# Patient Record
Sex: FEMALE | Race: WHITE | NOT HISPANIC OR LATINO | Employment: OTHER | URBAN - METROPOLITAN AREA
[De-identification: names, ages, dates, MRNs, and addresses within clinical notes are randomized per-mention and may not be internally consistent; named-entity substitution may affect disease eponyms.]

---

## 2017-01-12 ENCOUNTER — TELEPHONE (OUTPATIENT)
Dept: CARDIOLOGY | Facility: CLINIC | Age: 67
End: 2017-01-12

## 2017-01-12 DIAGNOSIS — J44.89 ASTHMA WITH COPD: ICD-10-CM

## 2017-01-12 NOTE — TELEPHONE ENCOUNTER
----- Message from Liv Louise sent at 1/12/2017  8:58 AM CST -----  Patient she has called in a refill on [place] to [facility] [phone] and they have not gotten a response from the doctor.   She wants to know if you have called it in yet?   Call her at [phone].                                                   rome

## 2017-01-12 NOTE — TELEPHONE ENCOUNTER
----- Message from Liv Louise sent at 1/12/2017  9:00 AM CST -----  Patient wants to know when she is suppose to come back in because she has nothing scheduled to see Dr Urban.   Call her at [phone].                                       rome

## 2017-01-17 ENCOUNTER — LAB VISIT (OUTPATIENT)
Dept: LAB | Facility: HOSPITAL | Age: 67
End: 2017-01-17
Attending: INTERNAL MEDICINE
Payer: COMMERCIAL

## 2017-01-17 DIAGNOSIS — E11.9 DIABETES MELLITUS WITHOUT COMPLICATION: ICD-10-CM

## 2017-01-17 LAB
ALBUMIN SERPL BCP-MCNC: 3.7 G/DL
ALP SERPL-CCNC: 132 U/L
ALT SERPL W/O P-5'-P-CCNC: 16 U/L
ANION GAP SERPL CALC-SCNC: 10 MMOL/L
AST SERPL-CCNC: 17 U/L
BILIRUB SERPL-MCNC: 0.3 MG/DL
BUN SERPL-MCNC: 15 MG/DL
CALCIUM SERPL-MCNC: 9.5 MG/DL
CHLORIDE SERPL-SCNC: 101 MMOL/L
CHOLEST/HDLC SERPL: 2.9 {RATIO}
CO2 SERPL-SCNC: 26 MMOL/L
CREAT SERPL-MCNC: 0.9 MG/DL
EST. GFR  (AFRICAN AMERICAN): >60 ML/MIN/1.73 M^2
EST. GFR  (NON AFRICAN AMERICAN): >60 ML/MIN/1.73 M^2
GLUCOSE SERPL-MCNC: 125 MG/DL
HDL/CHOLESTEROL RATIO: 34.1 %
HDLC SERPL-MCNC: 129 MG/DL
HDLC SERPL-MCNC: 44 MG/DL
LDLC SERPL CALC-MCNC: 58.6 MG/DL
NONHDLC SERPL-MCNC: 85 MG/DL
POTASSIUM SERPL-SCNC: 4.4 MMOL/L
PROT SERPL-MCNC: 7.4 G/DL
SODIUM SERPL-SCNC: 137 MMOL/L
TRIGL SERPL-MCNC: 132 MG/DL

## 2017-01-17 PROCEDURE — 36415 COLL VENOUS BLD VENIPUNCTURE: CPT

## 2017-01-17 PROCEDURE — 80061 LIPID PANEL: CPT

## 2017-01-17 PROCEDURE — 80053 COMPREHEN METABOLIC PANEL: CPT

## 2017-01-24 ENCOUNTER — OFFICE VISIT (OUTPATIENT)
Dept: INTERNAL MEDICINE | Facility: CLINIC | Age: 67
End: 2017-01-24
Payer: COMMERCIAL

## 2017-01-24 VITALS
OXYGEN SATURATION: 95 % | WEIGHT: 164.88 LBS | HEART RATE: 76 BPM | DIASTOLIC BLOOD PRESSURE: 74 MMHG | SYSTOLIC BLOOD PRESSURE: 128 MMHG | TEMPERATURE: 98 F | BODY MASS INDEX: 29.21 KG/M2 | HEIGHT: 63 IN

## 2017-01-24 DIAGNOSIS — M79.7 FIBROMYALGIA: Chronic | ICD-10-CM

## 2017-01-24 DIAGNOSIS — I25.10 CORONARY ARTERY DISEASE INVOLVING NATIVE CORONARY ARTERY OF NATIVE HEART WITHOUT ANGINA PECTORIS: Chronic | ICD-10-CM

## 2017-01-24 DIAGNOSIS — E78.5 HYPERLIPIDEMIA LDL GOAL <70: ICD-10-CM

## 2017-01-24 DIAGNOSIS — I10 ESSENTIAL HYPERTENSION: Primary | Chronic | ICD-10-CM

## 2017-01-24 PROCEDURE — 3060F POS MICROALBUMINURIA REV: CPT | Mod: S$GLB,,, | Performed by: INTERNAL MEDICINE

## 2017-01-24 PROCEDURE — 99214 OFFICE O/P EST MOD 30 MIN: CPT | Mod: S$GLB,,, | Performed by: INTERNAL MEDICINE

## 2017-01-24 PROCEDURE — 3045F PR MOST RECENT HEMOGLOBIN A1C LEVEL 7.0-9.0%: CPT | Mod: S$GLB,,, | Performed by: INTERNAL MEDICINE

## 2017-01-24 PROCEDURE — 3078F DIAST BP <80 MM HG: CPT | Mod: S$GLB,,, | Performed by: INTERNAL MEDICINE

## 2017-01-24 PROCEDURE — 3074F SYST BP LT 130 MM HG: CPT | Mod: S$GLB,,, | Performed by: INTERNAL MEDICINE

## 2017-01-24 PROCEDURE — 1159F MED LIST DOCD IN RCRD: CPT | Mod: S$GLB,,, | Performed by: INTERNAL MEDICINE

## 2017-01-24 PROCEDURE — 99999 PR PBB SHADOW E&M-EST. PATIENT-LVL III: CPT | Mod: PBBFAC,,, | Performed by: INTERNAL MEDICINE

## 2017-01-24 NOTE — MR AVS SNAPSHOT
[facility]  [address] [address]  Phone: [phone]  Fax: [phone]                  Tiana Bosch   2017 9:20 AM   Office Visit    Description:  Female : 1950   Provider:  Estelle Stone DO   [facility]           Reason for Visit     Follow-up           Diagnoses this Visit        Comments    Essential hypertension    -  Primary     Hyperlipidemia LDL goal <70         Coronary artery disease involving native coronary artery of native heart without angina pectoris         Uncontrolled type 2 diabetes mellitus without complication, without long-term current use of insulin         Fibromyalgia                To Do List           Future Appointments        Provider Department Dept Phone    2017 8:40 AM LABORATORY, [address] [facility] [phone]    2017 1:30 PM Sriram Urban MD [facility] [phone]    2017 8:20 AM LABORATORY, [address] [facility] [phone]    2017 8:50 AM SPECIMEN, [address] [facility] [phone]    2017 9:20 AM Estelle Stone DO [facility] Medicine [phone]      Goals (5 Years of Data)     None      [facility]     [facility] Nurse Care [facility] -  Assistance  Registered nurses in the [facility] provide clinical advisement, health education, appointment booking, and other advisory services.  Call for this free service at [phone].             Medications           Message regarding Medications     Verify the changes and/or additions to your medication regime listed below are the same as discussed with your clinician today.  If any of these changes or additions are incorrect, please notify your healthcare provider.        STOP taking these medications     azithromycin (ZITHROMAX Z-AMNA) 250 MG tablet Take 1 tablet (250 mg total) by mouth once daily. 500 mg on day  1 (two tablets) followed by 250 mg once daily on days 2-5    chlorhexidine (PERIDEX) 0.12 % solution     doxycycline (MONODOX) 100 MG capsule TAKE ONE CAPSULE BY MOUTH 2 TIMES PER DAY FOR 10 DAYS           Verify that the below list of medications is an accurate representation of the medications you are currently taking.  If none reported, the list may be blank. If incorrect, please contact your healthcare provider. Carry this list with you in case of emergency.           Current Medications     albuterol (PROVENTIL HFA) 90 mcg/actuation inhaler Inhale 2 puffs into the lungs every 6 (six) hours as needed for Wheezing.    aspirin (ECOTRIN) 81 MG EC tablet Take 1 tablet (81 mg total) by mouth once daily.    calcium carbonate (OS-RENALDO) 500 mg calcium (1,250 mg) tablet Take 1 tablet by mouth once daily.    clopidogrel (PLAVIX) 75 mg tablet Take 1 tablet (75 mg total) by mouth once daily.    cyclobenzaprine (FLEXERIL) 10 MG tablet Take 1 tablet (10 mg total) by mouth 3 (three) times daily.    fluocinolone acetonide oil 0.01 % Drop INSTILL THREE DROPS INTO AFFECTED EAR TWICE DAILY     gabapentin (NEURONTIN) 400 MG capsule Take 2 capsules (800 mg total) by mouth 3 (three) times daily.    griseofulvin (GRIFULVIN V) 500 mg tablet Take 500 mg by mouth once daily.    lancets Misc 1 lancet by Misc.(Non-Drug; Combo Route) route daily as needed. Check glucose once daily    lorazepam (ATIVAN) 0.5 MG tablet Take 1 tablet (0.5 mg total) by mouth every 12 (twelve) hours as needed for Anxiety.    metformin (GLUCOPHAGE-XR) 500 MG 24 hr tablet TAKE 1 TABLET (500 MG TOTAL) BY MOUTH DAILY WITH BREAKFAST.    metoprolol tartrate (LOPRESSOR) 25 MG tablet Take 1 tablet (25 mg total) by mouth 2 (two) times daily.    mometasone-formoterol (DULERA) 200-5 mcg/actuation inhaler Inhale 2 puffs into the lungs 2 (two) times daily.    montelukast (SINGULAIR) 10 mg tablet Take 1 tablet (10 mg total) by mouth every evening.    nitroGLYCERIN (NITROSTAT)  "0.4 MG SL tablet Place 1 tablet (0.4 mg total) under the tongue every 5 (five) minutes as needed for Chest pain.    ONETOUCH DELICA LANCETS 33 gauge Misc     ONETOUCH ULTRA TEST Strp TEST ONE TIME AMAYA.    tramadol (ULTRAM) 50 mg tablet Take 2 tablets (100 mg total) by mouth every 8 (eight) hours as needed for Pain.           Clinical Reference Information           Vital Signs - Last Recorded  Most recent update: 1/24/2017  9:45 AM by Radha Del Valle    BP Pulse Temp Ht    128/74 (BP Location: Left arm, Patient Position: Sitting, BP Method: Manual) 76 98 °F (36.7 °C) (Tympanic) 5' 3" (1.6 m)    Wt SpO2 BMI    74.8 kg (164 lb 14.5 oz) 95% 29.21 kg/m2      Blood Pressure          Most Recent Value    BP  128/74      Allergies as of 1/24/2017     Codeine    Spiriva Respimat [Tiotropium Bromide]    Statins-hmg-coa Reductase Inhibitors      Immunizations Administered on Date of Encounter - 1/24/2017     None      Orders Placed During Today's Visit     Future Labs/Procedures Expected by Expires    MICROALBUMIN / CREATININE RATIO URINE  1/24/2017 3/25/2018    TSH  As directed 1/24/2018    Recurring Lab Work Interval Expires    Comprehensive metabolic panel  Every 12 Weeks until 3/25/2019 3/25/2019    Hemoglobin A1c  Every 12 Weeks until 3/25/2019 3/25/2019    Lipid panel  Every 12 Weeks until 3/25/2019 3/25/2019      "
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
## 2017-01-28 DIAGNOSIS — M79.7 FIBROMYALGIA: ICD-10-CM

## 2017-01-30 RX ORDER — TRAMADOL HYDROCHLORIDE 50 MG/1
TABLET ORAL
Qty: 180 TABLET | Refills: 0 | OUTPATIENT
Start: 2017-01-30

## 2017-01-31 DIAGNOSIS — F43.23 SITUATIONAL MIXED ANXIETY AND DEPRESSIVE DISORDER: ICD-10-CM

## 2017-02-01 RX ORDER — LORAZEPAM 0.5 MG/1
0.5 TABLET ORAL EVERY 12 HOURS PRN
Qty: 30 TABLET | Refills: 0 | Status: SHIPPED | OUTPATIENT
Start: 2017-02-01 | End: 2017-04-09 | Stop reason: SDUPTHER

## 2017-02-07 ENCOUNTER — TELEPHONE (OUTPATIENT)
Dept: OBSTETRICS AND GYNECOLOGY | Facility: CLINIC | Age: 67
End: 2017-02-07

## 2017-02-07 ENCOUNTER — TELEPHONE (OUTPATIENT)
Dept: CARDIOLOGY | Facility: CLINIC | Age: 67
End: 2017-02-07

## 2017-02-07 DIAGNOSIS — Z12.31 ENCOUNTER FOR SCREENING MAMMOGRAM FOR MALIGNANT NEOPLASM OF BREAST: Primary | ICD-10-CM

## 2017-02-07 DIAGNOSIS — M79.7 FIBROMYALGIA: ICD-10-CM

## 2017-02-07 NOTE — TELEPHONE ENCOUNTER
----- Message from Kaye Roy sent at 2/7/2017  8:50 AM CST -----  Contact: Patient  Patient is requesting a mammogram, but there are no orders in computer, please call patient back at [phone]. Thank you

## 2017-02-07 NOTE — TELEPHONE ENCOUNTER
----- Message from Kaye Roy sent at 2/7/2017  8:47 AM CST -----  Contact: patient  Patient was scheduled for labs tomorrow, but had labs last week, patient wants to know if she has to have labs again, please call her back at [phone]. Thank you

## 2017-02-07 NOTE — TELEPHONE ENCOUNTER
Lab appt for tomorrow has been canceled since labs were done last week.  Patient is aware and voiced understanding.

## 2017-02-08 ENCOUNTER — TELEPHONE (OUTPATIENT)
Dept: PULMONOLOGY | Facility: CLINIC | Age: 67
End: 2017-02-08

## 2017-02-08 DIAGNOSIS — J44.89 ASTHMA WITH COPD: Primary | ICD-10-CM

## 2017-02-08 NOTE — TELEPHONE ENCOUNTER
----- Message from Shahida Anderson sent at 2/7/2017  2:43 PM CST -----  Contact: pt  Pt is requesting to speak with nurse regarding questions on her inhaler medication. Pt did not have drug specifics. Pls call pt back at [phone]

## 2017-02-09 ENCOUNTER — PATIENT MESSAGE (OUTPATIENT)
Dept: INTERNAL MEDICINE | Facility: CLINIC | Age: 67
End: 2017-02-09

## 2017-02-09 RX ORDER — TRAMADOL HYDROCHLORIDE 50 MG/1
100 TABLET ORAL EVERY 8 HOURS PRN
Qty: 180 TABLET | Refills: 5 | Status: SHIPPED | OUTPATIENT
Start: 2017-02-09 | End: 2017-08-04 | Stop reason: SDUPTHER

## 2017-02-16 ENCOUNTER — OFFICE VISIT (OUTPATIENT)
Dept: CARDIOLOGY | Facility: CLINIC | Age: 67
End: 2017-02-16
Payer: COMMERCIAL

## 2017-02-16 ENCOUNTER — CLINICAL SUPPORT (OUTPATIENT)
Dept: CARDIOLOGY | Facility: CLINIC | Age: 67
End: 2017-02-16
Payer: COMMERCIAL

## 2017-02-16 VITALS
WEIGHT: 167.31 LBS | DIASTOLIC BLOOD PRESSURE: 74 MMHG | HEIGHT: 63 IN | SYSTOLIC BLOOD PRESSURE: 150 MMHG | HEART RATE: 89 BPM | BODY MASS INDEX: 29.64 KG/M2

## 2017-02-16 DIAGNOSIS — S42.202D CLOSED FRACTURE OF PROXIMAL END OF LEFT HUMERUS WITH ROUTINE HEALING, UNSPECIFIED FRACTURE MORPHOLOGY, SUBSEQUENT ENCOUNTER: ICD-10-CM

## 2017-02-16 DIAGNOSIS — R06.00 DYSPNEA, UNSPECIFIED TYPE: ICD-10-CM

## 2017-02-16 DIAGNOSIS — I10 ESSENTIAL HYPERTENSION: Chronic | ICD-10-CM

## 2017-02-16 DIAGNOSIS — S42.209A: ICD-10-CM

## 2017-02-16 DIAGNOSIS — I25.10 CORONARY ARTERY DISEASE, ANGINA PRESENCE UNSPECIFIED, UNSPECIFIED VESSEL OR LESION TYPE, UNSPECIFIED WHETHER NATIVE OR TRANSPLANTED HEART: ICD-10-CM

## 2017-02-16 DIAGNOSIS — J96.11 CHRONIC RESPIRATORY FAILURE WITH HYPOXIA: ICD-10-CM

## 2017-02-16 DIAGNOSIS — E78.5 HYPERLIPIDEMIA LDL GOAL <70: ICD-10-CM

## 2017-02-16 DIAGNOSIS — J44.9 COPD, SEVERE: ICD-10-CM

## 2017-02-16 DIAGNOSIS — I25.10 CORONARY ARTERY DISEASE, ANGINA PRESENCE UNSPECIFIED, UNSPECIFIED VESSEL OR LESION TYPE, UNSPECIFIED WHETHER NATIVE OR TRANSPLANTED HEART: Primary | ICD-10-CM

## 2017-02-16 PROCEDURE — 3078F DIAST BP <80 MM HG: CPT | Mod: S$GLB,,, | Performed by: INTERNAL MEDICINE

## 2017-02-16 PROCEDURE — 99999 PR PBB SHADOW E&M-EST. PATIENT-LVL III: CPT | Mod: PBBFAC,,, | Performed by: INTERNAL MEDICINE

## 2017-02-16 PROCEDURE — 3060F POS MICROALBUMINURIA REV: CPT | Mod: S$GLB,,, | Performed by: INTERNAL MEDICINE

## 2017-02-16 PROCEDURE — 3045F PR MOST RECENT HEMOGLOBIN A1C LEVEL 7.0-9.0%: CPT | Mod: S$GLB,,, | Performed by: INTERNAL MEDICINE

## 2017-02-16 PROCEDURE — 93000 ELECTROCARDIOGRAM COMPLETE: CPT | Mod: S$GLB,,, | Performed by: INTERNAL MEDICINE

## 2017-02-16 PROCEDURE — 99214 OFFICE O/P EST MOD 30 MIN: CPT | Mod: S$GLB,,, | Performed by: INTERNAL MEDICINE

## 2017-02-16 PROCEDURE — 3077F SYST BP >= 140 MM HG: CPT | Mod: S$GLB,,, | Performed by: INTERNAL MEDICINE

## 2017-02-16 NOTE — MR AVS SNAPSHOT
[facility]  [address] [address]  Phone: [phone]  Fax: [phone]                  Tiana Bosch   2017 1:30 PM   Office Visit    Description:  Female : 1950   Provider:  Sriram Urban MD   Department:  [facility]           Reason for Visit     Coronary Artery Disease     Hypertension           Diagnoses this Visit        Comments    Coronary artery disease, angina presence unspecified, unspecified vessel or lesion type, unspecified whether native or transplanted heart    -  Primary     Dyspnea, unspecified type         Essential hypertension         Hyperlipidemia LDL goal <70         Uncontrolled type 2 diabetes mellitus without complication, without long-term current use of insulin         Chronic respiratory failure with hypoxia         COPD, severe         Fracture of proximal end of humerus, unspecified fracture morphology, unspecified laterality, initial encounter         Closed fracture of proximal end of left humerus with routine healing, unspecified fracture morphology, subsequent encounter                To Do List           Future Appointments        Provider Department Dept Phone    3/7/2017 2:00 PM [facility] MAMMO1 [facility] [phone]    3/7/2017 2:30 PM Shy Lamb MD [facility] OB/ -[phone]    2017 8:20 AM LABORATORY, [address] [facility] [phone]    2017 8:50 AM SPECIMEN, [address] [facility] [phone]    2017 9:20 AM Estelle Stone DO [facility] - Internal Medicine [phone]      Goals (5 Years of Data)     None      Follow-Up and Disposition     Return in about 6 months (around 2017).      [facility]     [facility] Care [facility] -  Assistance  Registered nurses in the [facility] provide clinical advisement, health education, appointment booking, and other advisory services.  Call for this free service  at [phone].             Medications           Message regarding Medications     Verify the changes and/or additions to your medication regime listed below are the same as discussed with your clinician today.  If any of these changes or additions are incorrect, please notify your healthcare provider.        STOP taking these medications     clopidogrel (PLAVIX) 75 mg tablet Take 1 tablet (75 mg total) by mouth once daily.           Verify that the below list of medications is an accurate representation of the medications you are currently taking.  If none reported, the list may be blank. If incorrect, please contact your healthcare provider. Carry this list with you in case of emergency.           Current Medications     aspirin (ECOTRIN) 81 MG EC tablet Take 1 tablet (81 mg total) by mouth once daily.    calcium carbonate (OS-RENALDO) 500 mg calcium (1,250 mg) tablet Take 1 tablet by mouth once daily.    cyclobenzaprine (FLEXERIL) 10 MG tablet Take 1 tablet (10 mg total) by mouth 3 (three) times daily.    fluocinolone acetonide oil 0.01 % Drop INSTILL THREE DROPS INTO AFFECTED EAR TWICE DAILY     gabapentin (NEURONTIN) 400 MG capsule Take 2 capsules (800 mg total) by mouth 3 (three) times daily.    griseofulvin (GRIFULVIN V) 500 mg tablet Take 500 mg by mouth once daily.    lorazepam (ATIVAN) 0.5 MG tablet Take 1 tablet (0.5 mg total) by mouth every 12 (twelve) hours as needed for Anxiety.    metformin (GLUCOPHAGE-XR) 500 MG 24 hr tablet TAKE 1 TABLET (500 MG TOTAL) BY MOUTH DAILY WITH BREAKFAST.    metoprolol tartrate (LOPRESSOR) 25 MG tablet Take 1 tablet (25 mg total) by mouth 2 (two) times daily.    mometasone-formoterol (DULERA) 200-5 mcg/actuation inhaler Inhale 2 puffs into the lungs 2 (two) times daily.    montelukast (SINGULAIR) 10 mg tablet Take 1 tablet (10 mg total) by mouth every evening.    tramadol (ULTRAM) 50 mg tablet Take 2 tablets (100 mg total) by mouth every 8 (eight) hours as needed for  "Pain.    albuterol (PROVENTIL HFA) 90 mcg/actuation inhaler Inhale 2 puffs into the lungs every 6 (six) hours as needed for Wheezing.    inhalation device (BREATHERITE VALVED MDI CHAMBER) Use as directed for inhalation.    lancets Misc 1 lancet by Misc.(Non-Drug; Combo Route) route daily as needed. Check glucose once daily    nitroGLYCERIN (NITROSTAT) 0.4 MG SL tablet Place 1 tablet (0.4 mg total) under the tongue every 5 (five) minutes as needed for Chest pain.    ONETOUCH DELICA LANCETS 33 gauge Misc     ONETOUCH ULTRA TEST Strp TEST ONE TIME AMAYA.           Clinical Reference Information           Your Vitals Were     BP Pulse Height Weight BMI    150/74 89 5' 3" (1.6 m) 75.9 kg (167 lb 5.3 oz) 29.64 kg/m2      Blood Pressure          Most Recent Value    Right Arm BP - Sitting  150/74    Left Arm BP - Sitting  150/72    BP  (!)  150/74      Allergies as of 2/16/2017     Codeine    Spiriva Respimat [Tiotropium Bromide]    Statins-hmg-coa Reductase Inhibitors      Immunizations Administered on Date of Encounter - 2/16/2017     None      Orders Placed During Today's Visit     Future Labs/Procedures Expected by Expires    Comprehensive metabolic panel  8/18/2017 (Approximate) 2/16/2018    Lipid panel  8/18/2017 (Approximate) 2/16/2018    SCHEDULED EKG 12-LEAD (to Muse)  As directed 2/14/2018      Language Assistance Services     ATTENTION: Language assistance services are available, free of charge. Please call [phone].      ATENCIÓN: Si shala camacho, tiene a pepe disposición servicios gratuitos de asistencia lingüística. Llame al [phone].     CHÚ Ý: N?u b?n nói Ti?ng Vi?t, có các d?ch v? h? tr? ngôn ng? mi?n phí dành cho b?n. G?i s? [phone].         [facility] complies with applicable Federal civil rights laws and does not discriminate on the basis of race, color, national origin, age, disability, or sex.        "
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
## 2017-03-01 ENCOUNTER — PATIENT MESSAGE (OUTPATIENT)
Dept: OPHTHALMOLOGY | Facility: CLINIC | Age: 67
End: 2017-03-01

## 2017-03-01 ENCOUNTER — TELEPHONE (OUTPATIENT)
Dept: OPHTHALMOLOGY | Facility: CLINIC | Age: 67
End: 2017-03-01

## 2017-03-06 ENCOUNTER — OFFICE VISIT (OUTPATIENT)
Dept: URGENT CARE | Facility: CLINIC | Age: 67
End: 2017-03-06
Payer: COMMERCIAL

## 2017-03-06 ENCOUNTER — HOSPITAL ENCOUNTER (OUTPATIENT)
Dept: RADIOLOGY | Facility: HOSPITAL | Age: 67
Discharge: HOME | End: 2017-03-06
Attending: NURSE PRACTITIONER
Payer: COMMERCIAL

## 2017-03-06 VITALS
HEART RATE: 89 BPM | RESPIRATION RATE: 20 BRPM | BODY MASS INDEX: 29.8 KG/M2 | HEIGHT: 63 IN | SYSTOLIC BLOOD PRESSURE: 136 MMHG | OXYGEN SATURATION: 95 % | DIASTOLIC BLOOD PRESSURE: 70 MMHG | WEIGHT: 168.19 LBS | TEMPERATURE: 99 F

## 2017-03-06 DIAGNOSIS — M79.675 PAIN OF TOE OF LEFT FOOT: Primary | ICD-10-CM

## 2017-03-06 DIAGNOSIS — M79.675 PAIN OF TOE OF LEFT FOOT: ICD-10-CM

## 2017-03-06 PROCEDURE — 1160F RVW MEDS BY RX/DR IN RCRD: CPT | Mod: S$GLB,,, | Performed by: NURSE PRACTITIONER

## 2017-03-06 PROCEDURE — 3075F SYST BP GE 130 - 139MM HG: CPT | Mod: S$GLB,,, | Performed by: NURSE PRACTITIONER

## 2017-03-06 PROCEDURE — 99213 OFFICE O/P EST LOW 20 MIN: CPT | Mod: 25,S$GLB,, | Performed by: NURSE PRACTITIONER

## 2017-03-06 PROCEDURE — 96372 THER/PROPH/DIAG INJ SC/IM: CPT | Mod: S$GLB,,, | Performed by: NURSE PRACTITIONER

## 2017-03-06 PROCEDURE — 99999 PR PBB SHADOW E&M-EST. PATIENT-LVL V: CPT | Mod: PBBFAC,,, | Performed by: NURSE PRACTITIONER

## 2017-03-06 PROCEDURE — 73630 X-RAY EXAM OF FOOT: CPT | Mod: 26,LT,, | Performed by: RADIOLOGY

## 2017-03-06 PROCEDURE — 3078F DIAST BP <80 MM HG: CPT | Mod: S$GLB,,, | Performed by: NURSE PRACTITIONER

## 2017-03-06 PROCEDURE — 73630 X-RAY EXAM OF FOOT: CPT | Mod: TC,PO,LT

## 2017-03-06 RX ORDER — KETOROLAC TROMETHAMINE 30 MG/ML
60 INJECTION, SOLUTION INTRAMUSCULAR; INTRAVENOUS ONCE
Status: COMPLETED | OUTPATIENT
Start: 2017-03-06 | End: 2017-03-06

## 2017-03-06 RX ADMIN — KETOROLAC TROMETHAMINE 60 MG: 30 INJECTION, SOLUTION INTRAMUSCULAR; INTRAVENOUS at 04:03

## 2017-03-06 NOTE — PROGRESS NOTES
Per written order of Rachel Herring NP  Ketorolac 60 mg/2 ml was given to the Left  VG, advised patient to wait 20 min after injection to watch for s/s of adverse reactions, patient waited in exam room. No distress noted.

## 2017-03-06 NOTE — MR AVS SNAPSHOT
[facility]  [address]  [facility] [address]  Phone: [phone]  Fax: [phone]                  Tiana Bosch   3/6/2017 3:10 PM   Office Visit    Description:  Female : 1950   Provider:  Meche Herring NP   Department:  [facility]           Reason for Visit     Foot Injury           Diagnoses this Visit        Comments    Pain of toe of left foot    -  Primary            To Do List           Future Appointments        Provider Department Dept Phone    3/7/2017 2:00 PM [facility] M[facility]1 [facility] [phone]    3/7/2017 2:30 PM Shy Lamb MD [facility]/ -[phone]    2017 8:20 AM LABORATORY, [address] [facility] [phone]    2017 8:50 AM SPECIMEN, [address] [facility] [phone]    2017 9:20 AM Estelle Stone DO [facility] Medicine [phone]      Goals (5 Years of Data)     None      [facility]     [facility] -  Assistance  Registered nurses in the [facility] provide clinical advisement, health education, appointment booking, and other advisory services.  Call for this free service at [phone].             Medications           Message regarding Medications     Verify the changes and/or additions to your medication regime listed below are the same as discussed with your clinician today.  If any of these changes or additions are incorrect, please notify your healthcare provider.        These medications were administered today        Dose Freq    ketorolac injection 60 mg 60 mg Once    Sig: Inject 2 mLs (60 mg total) into the muscle once.    Class: Normal    Route: Intramuscular           Verify that the below list of medications is an accurate representation of the medications you are currently taking.  If none reported, the list may be blank. If incorrect, please contact your healthcare provider.  Carry this list with you in case of emergency.           Current Medications     albuterol (PROVENTIL HFA) 90 mcg/actuation inhaler Inhale 2 puffs into the lungs every 6 (six) hours as needed for Wheezing.    aspirin (ECOTRIN) 81 MG EC tablet Take 1 tablet (81 mg total) by mouth once daily.    calcium carbonate (OS-RENALDO) 500 mg calcium (1,250 mg) tablet Take 1 tablet by mouth once daily.    cyclobenzaprine (FLEXERIL) 10 MG tablet Take 1 tablet (10 mg total) by mouth 3 (three) times daily.    fluocinolone acetonide oil 0.01 % Drop INSTILL THREE DROPS INTO AFFECTED EAR TWICE DAILY     gabapentin (NEURONTIN) 400 MG capsule Take 2 capsules (800 mg total) by mouth 3 (three) times daily.    griseofulvin (GRIFULVIN V) 500 mg tablet Take 500 mg by mouth once daily.    inhalation device (BREATHERITE VALVED MDI CHAMBER) Use as directed for inhalation.    lancets Misc 1 lancet by Misc.(Non-Drug; Combo Route) route daily as needed. Check glucose once daily    lorazepam (ATIVAN) 0.5 MG tablet Take 1 tablet (0.5 mg total) by mouth every 12 (twelve) hours as needed for Anxiety.    metformin (GLUCOPHAGE-XR) 500 MG 24 hr tablet TAKE 1 TABLET (500 MG TOTAL) BY MOUTH DAILY WITH BREAKFAST.    metoprolol tartrate (LOPRESSOR) 25 MG tablet Take 1 tablet (25 mg total) by mouth 2 (two) times daily.    mometasone-formoterol (DULERA) 200-5 mcg/actuation inhaler Inhale 2 puffs into the lungs 2 (two) times daily.    montelukast (SINGULAIR) 10 mg tablet Take 1 tablet (10 mg total) by mouth every evening.    nitroGLYCERIN (NITROSTAT) 0.4 MG SL tablet Place 1 tablet (0.4 mg total) under the tongue every 5 (five) minutes as needed for Chest pain.    ONETOUCH DELICA LANCETS 33 gauge Misc     ONETOUCH ULTRA TEST Strp TEST ONE TIME AMAYA.    tramadol (ULTRAM) 50 mg tablet Take 2 tablets (100 mg total) by mouth every 8 (eight) hours as needed for Pain.           Clinical Reference Information           Your Vitals Were     BP Pulse Temp Resp    136/70  "(BP Location: Right arm, Patient Position: Sitting, BP Method: Manual) 89 98.8 °F (37.1 °C) (Tympanic) 20    Height Weight SpO2 BMI    5' 2.5" (1.588 m) 76.3 kg (168 lb 3.4 oz) 95% 30.28 kg/m2      Blood Pressure          Most Recent Value    BP  136/70      Allergies as of 3/6/2017     Codeine    Spiriva Respimat [Tiotropium Bromide]    Statins-hmg-coa Reductase Inhibitors      Immunizations Administered on Date of Encounter - 3/6/2017     None      Orders Placed During Today's Visit     Future Labs/Procedures Expected by Expires    X-Ray Foot Complete 3 view Left  3/6/2017 3/6/2018      Instructions    PLAN:   Toradol 60 mg IM now  Advise compresses and elevate extremity  Advised continue tramadol   Tylenol or Ibuprofen for fever, headache and body aches.  Advise follow-up with Primary care Physician   Advise  go to ER if symptoms worsen or fail to improve with treatment.           Language Assistance Services     ATTENTION: Language assistance services are available, free of charge. Please call [phone].      ATENCIÓN: Si habla camacho, tiene a pepe disposición servicios gratuitos de asistencia lingüística. Llame al [phone].     CHÚ Ý: N?u b?n nói Ti?ng Vi?t, có các d?ch v? h? tr? ngôn ng? mi?n phí dành cho b?n. G?i s? [phone].         [facility] complies with applicable Federal civil rights laws and does not discriminate on the basis of race, color, national origin, age, disability, or sex.        "

## 2017-03-06 NOTE — PATIENT INSTRUCTIONS
PLAN:   Toradol 60 mg IM now  Advised amirah tape  Advise compresses and elevate extremity  Advised continue tramadol   Tylenol or Ibuprofen for fever, headache and body aches.  Advise follow-up with Primary care Physician   Advise  go to ER if symptoms worsen or fail to improve with treatment.

## 2017-03-21 ENCOUNTER — OFFICE VISIT (OUTPATIENT)
Dept: OBSTETRICS AND GYNECOLOGY | Facility: CLINIC | Age: 67
End: 2017-03-21
Payer: COMMERCIAL

## 2017-03-21 ENCOUNTER — HOSPITAL ENCOUNTER (OUTPATIENT)
Dept: RADIOLOGY | Facility: HOSPITAL | Age: 67
Discharge: HOME | End: 2017-03-21
Attending: OBSTETRICS & GYNECOLOGY
Payer: COMMERCIAL

## 2017-03-21 VITALS — BODY MASS INDEX: 28.71 KG/M2 | HEIGHT: 64 IN | WEIGHT: 168.19 LBS

## 2017-03-21 DIAGNOSIS — Z12.31 ENCOUNTER FOR SCREENING MAMMOGRAM FOR MALIGNANT NEOPLASM OF BREAST: ICD-10-CM

## 2017-03-21 DIAGNOSIS — Z01.419 ENCOUNTER FOR GYNECOLOGICAL EXAMINATION WITHOUT ABNORMAL FINDING: Primary | ICD-10-CM

## 2017-03-21 PROCEDURE — 77067 SCR MAMMO BI INCL CAD: CPT | Mod: TC

## 2017-03-21 PROCEDURE — 99999 PR PBB SHADOW E&M-EST. PATIENT-LVL III: CPT | Mod: PBBFAC,,, | Performed by: OBSTETRICS & GYNECOLOGY

## 2017-03-21 PROCEDURE — 77067 SCR MAMMO BI INCL CAD: CPT | Mod: 26,,, | Performed by: RADIOLOGY

## 2017-03-21 PROCEDURE — 99396 PREV VISIT EST AGE 40-64: CPT | Mod: S$GLB,,, | Performed by: OBSTETRICS & GYNECOLOGY
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
## 2017-03-31 ENCOUNTER — PATIENT MESSAGE (OUTPATIENT)
Dept: PULMONOLOGY | Facility: CLINIC | Age: 67
End: 2017-03-31

## 2017-03-31 ENCOUNTER — PATIENT MESSAGE (OUTPATIENT)
Dept: CARDIOLOGY | Facility: CLINIC | Age: 67
End: 2017-03-31

## 2017-03-31 DIAGNOSIS — J44.9 COPD, SEVERE: ICD-10-CM

## 2017-03-31 DIAGNOSIS — I25.10 CORONARY ARTERY DISEASE INVOLVING NATIVE CORONARY ARTERY OF NATIVE HEART WITHOUT ANGINA PECTORIS: ICD-10-CM

## 2017-03-31 DIAGNOSIS — I21.4 NSTEMI (NON-ST ELEVATED MYOCARDIAL INFARCTION): ICD-10-CM

## 2017-03-31 RX ORDER — NITROGLYCERIN 0.4 MG/1
0.4 TABLET SUBLINGUAL EVERY 5 MIN PRN
Qty: 30 TABLET | Refills: 4 | Status: SHIPPED | OUTPATIENT
Start: 2017-03-31 | End: 2018-04-12 | Stop reason: SDUPTHER

## 2017-04-02 RX ORDER — ALBUTEROL SULFATE 90 UG/1
2 AEROSOL, METERED RESPIRATORY (INHALATION) EVERY 6 HOURS PRN
Qty: 18 G | Refills: 11 | Status: SHIPPED | OUTPATIENT
Start: 2017-04-02 | End: 2017-10-25 | Stop reason: SDUPTHER

## 2017-04-04 RX ORDER — LANCETS 33 GAUGE
1 EACH MISCELLANEOUS DAILY
Qty: 100 EACH | Refills: 3 | Status: SHIPPED | OUTPATIENT
Start: 2017-04-04 | End: 2018-04-10 | Stop reason: SDUPTHER

## 2017-04-09 DIAGNOSIS — F43.23 SITUATIONAL MIXED ANXIETY AND DEPRESSIVE DISORDER: ICD-10-CM

## 2017-04-10 RX ORDER — LORAZEPAM 0.5 MG/1
TABLET ORAL
Qty: 30 TABLET | Refills: 0 | Status: SHIPPED | OUTPATIENT
Start: 2017-04-10 | End: 2017-05-09 | Stop reason: SDUPTHER

## 2017-04-12 DIAGNOSIS — M79.7 FIBROMYALGIA: ICD-10-CM

## 2017-04-13 RX ORDER — GABAPENTIN 400 MG/1
800 CAPSULE ORAL 3 TIMES DAILY
Qty: 180 CAPSULE | Refills: 6 | Status: SHIPPED | OUTPATIENT
Start: 2017-04-13 | End: 2017-05-01 | Stop reason: SDUPTHER

## 2017-05-01 ENCOUNTER — TELEPHONE (OUTPATIENT)
Dept: INTERNAL MEDICINE | Facility: CLINIC | Age: 67
End: 2017-05-01

## 2017-05-01 ENCOUNTER — OFFICE VISIT (OUTPATIENT)
Dept: INTERNAL MEDICINE | Facility: CLINIC | Age: 67
End: 2017-05-01
Payer: COMMERCIAL

## 2017-05-01 ENCOUNTER — PATIENT MESSAGE (OUTPATIENT)
Dept: INTERNAL MEDICINE | Facility: CLINIC | Age: 67
End: 2017-05-01

## 2017-05-01 VITALS
TEMPERATURE: 99 F | WEIGHT: 165.56 LBS | BODY MASS INDEX: 29.34 KG/M2 | HEART RATE: 83 BPM | SYSTOLIC BLOOD PRESSURE: 120 MMHG | HEIGHT: 63 IN | DIASTOLIC BLOOD PRESSURE: 84 MMHG | OXYGEN SATURATION: 95 %

## 2017-05-01 DIAGNOSIS — M79.7 FIBROMYALGIA: ICD-10-CM

## 2017-05-01 PROCEDURE — 3060F POS MICROALBUMINURIA REV: CPT | Mod: 8P,S$GLB,, | Performed by: INTERNAL MEDICINE

## 2017-05-01 PROCEDURE — 3079F DIAST BP 80-89 MM HG: CPT | Mod: S$GLB,,, | Performed by: INTERNAL MEDICINE

## 2017-05-01 PROCEDURE — 3074F SYST BP LT 130 MM HG: CPT | Mod: S$GLB,,, | Performed by: INTERNAL MEDICINE

## 2017-05-01 PROCEDURE — 99213 OFFICE O/P EST LOW 20 MIN: CPT | Mod: S$GLB,,, | Performed by: INTERNAL MEDICINE

## 2017-05-01 PROCEDURE — 3045F PR MOST RECENT HEMOGLOBIN A1C LEVEL 7.0-9.0%: CPT | Mod: S$GLB,,, | Performed by: INTERNAL MEDICINE

## 2017-05-01 PROCEDURE — 99999 PR PBB SHADOW E&M-EST. PATIENT-LVL III: CPT | Mod: PBBFAC,,, | Performed by: INTERNAL MEDICINE

## 2017-05-01 PROCEDURE — 1160F RVW MEDS BY RX/DR IN RCRD: CPT | Mod: S$GLB,,, | Performed by: INTERNAL MEDICINE

## 2017-05-01 RX ORDER — GABAPENTIN 400 MG/1
CAPSULE ORAL
Qty: 180 CAPSULE | Refills: 6
Start: 2017-05-01 | End: 2017-05-02 | Stop reason: SDUPTHER

## 2017-05-01 RX ORDER — GLIPIZIDE 5 MG/1
5 TABLET ORAL
Qty: 30 TABLET | Refills: 1 | Status: SHIPPED | OUTPATIENT
Start: 2017-05-01 | End: 2017-08-04

## 2017-05-01 NOTE — TELEPHONE ENCOUNTER
Pt states a new prescription will be needed for gabapentin prescription was sent for 180 capsules and should be 210 capsules for 30 day supply.

## 2017-05-01 NOTE — TELEPHONE ENCOUNTER
----- Message from May Childs sent at 5/1/2017  4:23 PM CDT -----  States she was in the office on today and was advised to increase her Gabapentin. States need to speak to nurse. Please adv/call [phone].//thanks. cw

## 2017-05-01 NOTE — MR AVS SNAPSHOT
[facility]  [address] [facility] [address]  Phone: [phone]  Fax: [phone]                  Tiana Bosch   2017 9:00 AM   Office Visit    Description:  Female : 1950   Provider:  Estelle Stone DO   Department:  [facility]           Reason for Visit     Leg Pain           Diagnoses this Visit        Comments    Type 2 diabetes, uncontrolled, with neuropathy    -  Primary            To Do List           Future Appointments        Provider Department Dept Phone    2017 8:30 AM LABORATORY, [address] [facility] [phone]    2017 9:00 AM Estelle Stone DO [facility] [phone]    2017 8:20 AM LABORATORY, [address] [facility] [phone]    2017 8:50 AM SPECIMEN, [address] [facility] [phone]    2017 9:20 AM Estelle Stone DO [facility] [phone]      Goals (5 Years of Data)     None       These Medications        Disp Refills Start End    glipiZIDE (GLUCOTROL) 5 MG tablet 30 tablet 1 2017    Take 1 tablet (5 mg total) by mouth daily with breakfast. - Oral    Pharmacy: [facility] [id number] [address] [facility] - [place], [place] -  [facility] Ph #: [phone]         [facility]     [facility] Nurse Care Line -  Assistance  Unless otherwise directed by your provider, please contact [facility], our nurse care line that is available for  assistance.     Registered nurses in the [facility] provide: appointment scheduling, clinical advisement, health education, and other advisory services.  Call: [phone] (toll free)               Medications           Message regarding Medications     Verify the changes and/or additions to your medication regime listed below are the same as discussed with your clinician today.  If any of these changes or additions are incorrect, please  notify your healthcare provider.        START taking these NEW medications        Refills    glipiZIDE (GLUCOTROL) 5 MG tablet 1    Sig: Take 1 tablet (5 mg total) by mouth daily with breakfast.    Class: Normal    Route: Oral           Verify that the below list of medications is an accurate representation of the medications you are currently taking.  If none reported, the list may be blank. If incorrect, please contact your healthcare provider. Carry this list with you in case of emergency.           Current Medications     albuterol (PROVENTIL HFA) 90 mcg/actuation inhaler Inhale 2 puffs into the lungs every 6 (six) hours as needed for Wheezing.    calcium carbonate (OS-RENALDO) 500 mg calcium (1,250 mg) tablet Take 1 tablet by mouth once daily.    cyclobenzaprine (FLEXERIL) 10 MG tablet Take 1 tablet (10 mg total) by mouth 3 (three) times daily.    fluocinolone acetonide oil 0.01 % Drop INSTILL THREE DROPS INTO AFFECTED EAR TWICE DAILY     gabapentin (NEURONTIN) 400 MG capsule Take 2 capsules (800 mg total) by mouth 3 (three) times daily.    griseofulvin (GRIFULVIN V) 500 mg tablet Take 500 mg by mouth once daily.    inhalation device (BREATHERITE VALVED MDI CHAMBER) Use as directed for inhalation.    lancets (ONETOUCH DELICA LANCETS) 33 gauge Misc 1 lancet by Misc.(Non-Drug; Combo Route) route Daily. Use to    lorazepam (ATIVAN) 0.5 MG tablet TAKE ONE TABLET BY MOUTH ONCE EVERY 12 HOURS AS NEEDED FOR ANXIETY.    metformin (GLUCOPHAGE-XR) 500 MG 24 hr tablet TAKE 1 TABLET (500 MG TOTAL) BY MOUTH DAILY WITH BREAKFAST.    metoprolol tartrate (LOPRESSOR) 25 MG tablet Take 1 tablet (25 mg total) by mouth 2 (two) times daily.    mometasone-formoterol (DULERA) 200-5 mcg/actuation inhaler Inhale 2 puffs into the lungs 2 (two) times daily.    montelukast (SINGULAIR) 10 mg tablet Take 1 tablet (10 mg total) by mouth every evening.    nitroGLYCERIN (NITROSTAT) 0.4 MG SL tablet Place 1 tablet (0.4 mg total) under the tongue  "every 5 (five) minutes as needed for Chest pain.    ONETOUCH ULTRA TEST Strp TEST ONE TIME AMAYA.    tramadol (ULTRAM) 50 mg tablet Take 2 tablets (100 mg total) by mouth every 8 (eight) hours as needed for Pain.    aspirin (ECOTRIN) 81 MG EC tablet Take 1 tablet (81 mg total) by mouth once daily.    glipiZIDE (GLUCOTROL) 5 MG tablet Take 1 tablet (5 mg total) by mouth daily with breakfast.           Clinical Reference Information           Your Vitals Were     BP Pulse Temp Height Weight SpO2    120/84 83 98.8 °F (37.1 °C) 5' 2.5" (1.588 m) 75.1 kg (165 lb 9.1 oz) 95%    BMI                29.8 kg/m2          Blood Pressure          Most Recent Value    BP  120/84      Allergies as of 5/1/2017     Codeine    Spiriva Respimat [Tiotropium Bromide]    Statins-hmg-coa Reductase Inhibitors      Immunizations Administered on Date of Encounter - 5/1/2017     None      Language Assistance Services     ATTENTION: Language assistance services are available, free of charge. Please call [phone].      ATENCIÓN: Si everett sauer, tiene a pepe disposición servicios gratuitos de asistencia lingüística. Llame al [phone].     AMILCAR Ý: N?u b?n nói Ti?ng Vi?t, có các d?ch v? h? tr? ngôn ng? mi?n phí dành cho b?n. G?i s? [phone].         [facility] complies with applicable Federal civil rights laws and does not discriminate on the basis of race, color, national origin, age, disability, or sex.        "

## 2017-05-01 NOTE — PROGRESS NOTES
Tiana Bosch  66 y.o.  White female    Chief Complaint   Patient presents with    Leg Pain       HPI:  Presents to the clinic with complaint of bilateral leg pain and numbness in her feet. She has had symptoms for a while but they have been worsening over the past 6-7 months. She states the pain is in both thighs. Pain is worse with activity but can occur at rest. She states the pain shoots up both legs to her sides (hips). She denies trauma. She denies weakness. She has back pain but is not a significant concern.   She has a history of diabetes and fibromyalgia. She takes gabapentin 800 mg three times daily, Flexeril and tramadol.    She takes metformin for diabetes but she is convinced it is causing her symptoms.     PMH: Reviewed    MEDS: Reviewed med card    ALLERGIES: Reviewed allergy card    PE: Reviewed vitals  GENERAL: Alert and oriented, no acute distress  HEART: Regular rate  LUNGS: Unlabored respirations  FOOT EXAM:  Protective Sensation (w/ 10 gram monofilament):  Right: Intact  Left: Intact    Visual Inspection:  Normal -  Bilateral    Pedal Pulses:   Right: Present  Left: Present     ASSESSMENT/PLAN:    Tiana was seen today for leg pain.    Diagnoses and all orders for this visit:    Type 2 diabetes, uncontrolled, with neuropathy  -     glipiZIDE (GLUCOTROL) 5 MG tablet; Take 1 tablet (5 mg total) by mouth daily with breakfast. Hold metformin   -     Change gabapentin (NEURONTIN) 400 MG capsule; Take two capsules in the morning and afternoon and three capsules at night.    Fibromyalgia  -     Change gabapentin (NEURONTIN) 400 MG capsule; Take two capsules in the morning and afternoon and three capsules at night.    RTC: 2 weeks

## 2017-05-01 NOTE — PATIENT INSTRUCTIONS
Glipizide tablets  What is this medicine?  GLIPIZIDE (GLIP i zide) helps to treat type 2 diabetes. Treatment is combined with diet and exercise. The medicine helps your body to use insulin better.  How should I use this medicine?  Take this medicine by mouth. Swallow with a drink of water. Do not take with food. Take it 30 minutes before a meal. Follow the directions on the prescription label. If you take this medicine once a day, take it 30 minutes before breakfast. Take your doses at the same time each day. Do not take more often than directed.  Talk to your pediatrician regarding the use of this medicine in children. Special care may be needed.  Elderly patients over 65 years old may have a stronger reaction and need a smaller dose.  What side effects may I notice from receiving this medicine?  Side effects that you should report to your doctor or health care professional as soon as possible:  · allergic reactions like skin rash, itching or hives, swelling of the face, lips, or tongue  · breathing problems  · dark urine  · fever, chills, sore throat  · signs and symptoms of low blood sugar such as feeling anxious, confusion, dizziness, increased hunger, unusually weak or tired, sweating, shakiness, cold, irritable, headache, blurred vision, fast heartbeat, loss of consciousness  · unusual bleeding or bruising  · yellowing of the eyes or skin  Side effects that usually do not require medical attention (report to your doctor or health care professional if they continue or are bothersome):  · diarrhea  · dizziness  · headache  · heartburn  · nausea  · stomach gas  What may interact with this medicine?  · bosentan  · chloramphenicol  · cisapride  · clarithromycin  · medicines for fungal or yeast infections  · metoclopramide  · probenecid  · warfarin  Many medications may cause an increase or decrease in blood sugar, these include:  · alcohol containing beverages  · aspirin and aspirin-like  drugs  · chloramphenicol  · chromium  · diuretics  · female hormones, like estrogens or progestins and birth control pills  · heart medicines  · isoniazid  · male hormones or anabolic steroids  · medicines for weight loss  · medicines for allergies, asthma, cold, or cough  · medicines for mental problems  · medicines called MAO Inhibitors like Nardil, Parnate, Marplan, Eldepryl  · niacin  · NSAIDs, medicines for pain and inflammation, like ibuprofen or naproxen  · pentamidine  · phenytoin  · probenecid  · quinolone antibiotics like ciprofloxacin, levofloxacin, ofloxacin  · some herbal dietary supplements  · steroid medicines like prednisone or cortisone  · thyroid medicine  What if I miss a dose?  If you miss a dose, take it as soon as you can. If it is almost time for your next dose, take only that dose. Do not take double or extra doses.  Where should I keep my medicine?  Keep out of the reach of children.  Store at room temperature below 30 degrees C (86 degrees F). Throw away any unused medicine after the expiration date.  What should I tell my health care provider before I take this medicine?  They need to know if you have any of these conditions:  · diabetic ketoacidosis  · glucose-6-phosphate dehydrogenase deficiency  · heart disease  · kidney disease  · liver disease  · porphyria  · severe infection or injury  · thyroid disease  · an unusual or allergic reaction to glipizide, sulfa drugs, other medicines, foods, dyes, or preservatives  · pregnant or trying to get pregnant  · breast-feeding  What should I watch for while using this medicine?  Visit your doctor or health care professional for regular checks on your progress.  A test called the HbA1C (A1C) will be monitored. This is a simple blood test. It measures your blood sugar control over the last 2 to 3 months. You will receive this test every 3 to 6 months.  Learn how to check your blood sugar. Learn the symptoms of low and high blood sugar and how to  manage them.  Always carry a quick-source of sugar with you in case you have symptoms of low blood sugar. Examples include hard sugar candy or glucose tablets. Make sure others know that you can choke if you eat or drink when you develop serious symptoms of low blood sugar, such as seizures or unconsciousness. They must get medical help at once.  Tell your doctor or health care professional if you have high blood sugar. You might need to change the dose of your medicine. If you are sick or exercising more than usual, you might need to change the dose of your medicine.  Do not skip meals. Ask your doctor or health care professional if you should avoid alcohol. Many nonprescription cough and cold products contain sugar or alcohol. These can affect blood sugar.  This medicine can make you more sensitive to the sun. Keep out of the sun. If you cannot avoid being in the sun, wear protective clothing and use sunscreen. Do not use sun lamps or tanning beds/booths.  Wear a medical ID bracelet or chain, and carry a card that describes your disease and details of your medicine and dosage times.  Date Last Reviewed:   NOTE:This sheet is a summary. It may not cover all possible information. If you have questions about this medicine, talk to your doctor, pharmacist, or health care provider. Copyright© 2016 [employer]

## 2017-05-02 RX ORDER — GABAPENTIN 400 MG/1
CAPSULE ORAL
Qty: 210 CAPSULE | Refills: 6 | Status: SHIPPED | OUTPATIENT
Start: 2017-05-02 | End: 2019-01-01 | Stop reason: SDUPTHER

## 2017-05-08 ENCOUNTER — PATIENT MESSAGE (OUTPATIENT)
Dept: INTERNAL MEDICINE | Facility: CLINIC | Age: 67
End: 2017-05-08

## 2017-05-09 ENCOUNTER — PATIENT MESSAGE (OUTPATIENT)
Dept: INTERNAL MEDICINE | Facility: CLINIC | Age: 67
End: 2017-05-09

## 2017-05-09 DIAGNOSIS — F43.23 SITUATIONAL MIXED ANXIETY AND DEPRESSIVE DISORDER: ICD-10-CM

## 2017-05-09 RX ORDER — LORAZEPAM 0.5 MG/1
0.5 TABLET ORAL DAILY
Qty: 30 TABLET | Refills: 5 | Status: SHIPPED | OUTPATIENT
Start: 2017-05-09 | End: 2017-11-20 | Stop reason: SDUPTHER

## 2017-05-19 ENCOUNTER — LAB VISIT (OUTPATIENT)
Dept: LAB | Facility: HOSPITAL | Age: 67
End: 2017-05-19
Attending: INTERNAL MEDICINE
Payer: COMMERCIAL

## 2017-05-19 DIAGNOSIS — I10 ESSENTIAL HYPERTENSION: Chronic | ICD-10-CM

## 2017-05-19 DIAGNOSIS — E78.5 HYPERLIPIDEMIA LDL GOAL <70: ICD-10-CM

## 2017-05-19 DIAGNOSIS — I25.10 CORONARY ARTERY DISEASE INVOLVING NATIVE CORONARY ARTERY OF NATIVE HEART WITHOUT ANGINA PECTORIS: Chronic | ICD-10-CM

## 2017-05-19 LAB
ALBUMIN SERPL BCP-MCNC: 3.8 G/DL
ALP SERPL-CCNC: 156 U/L
ALT SERPL W/O P-5'-P-CCNC: 24 U/L
ANION GAP SERPL CALC-SCNC: 12 MMOL/L
AST SERPL-CCNC: 27 U/L
BILIRUB SERPL-MCNC: 0.4 MG/DL
BUN SERPL-MCNC: 18 MG/DL
CALCIUM SERPL-MCNC: 9.9 MG/DL
CHLORIDE SERPL-SCNC: 101 MMOL/L
CHOLEST/HDLC SERPL: 3.7 {RATIO}
CO2 SERPL-SCNC: 24 MMOL/L
CREAT SERPL-MCNC: 1 MG/DL
EST. GFR  (AFRICAN AMERICAN): >60 ML/MIN/1.73 M^2
EST. GFR  (NON AFRICAN AMERICAN): 59.7 ML/MIN/1.73 M^2
GLUCOSE SERPL-MCNC: 126 MG/DL
HDL/CHOLESTEROL RATIO: 27.1 %
HDLC SERPL-MCNC: 140 MG/DL
HDLC SERPL-MCNC: 38 MG/DL
LDLC SERPL CALC-MCNC: 73.2 MG/DL
NONHDLC SERPL-MCNC: 102 MG/DL
POTASSIUM SERPL-SCNC: 4.5 MMOL/L
PROT SERPL-MCNC: 7.7 G/DL
SODIUM SERPL-SCNC: 137 MMOL/L
TRIGL SERPL-MCNC: 144 MG/DL

## 2017-05-19 PROCEDURE — 36415 COLL VENOUS BLD VENIPUNCTURE: CPT

## 2017-05-19 PROCEDURE — 80061 LIPID PANEL: CPT

## 2017-05-19 PROCEDURE — 80053 COMPREHEN METABOLIC PANEL: CPT

## 2017-05-19 PROCEDURE — 83036 HEMOGLOBIN GLYCOSYLATED A1C: CPT

## 2017-05-20 LAB
ESTIMATED AVG GLUCOSE: 206 MG/DL
HBA1C MFR BLD HPLC: 8.8 %

## 2017-05-25 RX ORDER — METFORMIN HYDROCHLORIDE 500 MG/1
500 TABLET, EXTENDED RELEASE ORAL 2 TIMES DAILY WITH MEALS
Qty: 60 TABLET | Refills: 6 | Status: SHIPPED | OUTPATIENT
Start: 2017-05-25 | End: 2017-12-19 | Stop reason: SDUPTHER

## 2017-05-26 ENCOUNTER — PATIENT MESSAGE (OUTPATIENT)
Dept: INTERNAL MEDICINE | Facility: CLINIC | Age: 67
End: 2017-05-26

## 2017-06-20 ENCOUNTER — PATIENT MESSAGE (OUTPATIENT)
Dept: CARDIOLOGY | Facility: CLINIC | Age: 67
End: 2017-06-20

## 2017-07-19 ENCOUNTER — OFFICE VISIT (OUTPATIENT)
Dept: URGENT CARE | Facility: CLINIC | Age: 67
End: 2017-07-19
Payer: MEDICARE

## 2017-07-19 VITALS
BODY MASS INDEX: 30.4 KG/M2 | SYSTOLIC BLOOD PRESSURE: 154 MMHG | RESPIRATION RATE: 18 BRPM | OXYGEN SATURATION: 93 % | HEIGHT: 62 IN | WEIGHT: 165.19 LBS | TEMPERATURE: 99 F | HEART RATE: 82 BPM | DIASTOLIC BLOOD PRESSURE: 88 MMHG

## 2017-07-19 DIAGNOSIS — R21 RASH AND NONSPECIFIC SKIN ERUPTION: Primary | ICD-10-CM

## 2017-07-19 PROCEDURE — 99214 OFFICE O/P EST MOD 30 MIN: CPT | Mod: S$GLB,,, | Performed by: FAMILY MEDICINE

## 2017-07-19 RX ORDER — METHYLPREDNISOLONE 4 MG/1
TABLET ORAL
Qty: 1 PACKAGE | Refills: 0 | Status: SHIPPED | OUTPATIENT
Start: 2017-07-19 | End: 2017-08-04

## 2017-07-19 NOTE — PROGRESS NOTES
Subjective:       Patient ID: Tiana Bosch is a 66 y.o. female.    Chief Complaint: Rash    Rash   This is a new problem. The current episode started in the past 7 days. The problem has been gradually worsening since onset. The affected locations include the left upper leg and left arm. The rash is characterized by pain, redness and draining. Associated with: pt states she moved into a new house 1 month ago.  Associated symptoms include joint pain. Pertinent negatives include no fever, shortness of breath or sore throat. Past treatments include antihistamine (benadryl, cortizon 10). The treatment provided no relief.     Review of Systems   Constitution: Negative for chills and fever.   HENT: Negative for sore throat.    Respiratory: Negative for shortness of breath.    Skin: Positive for itching (mild) and rash (painful).   Musculoskeletal: Positive for joint pain.       Objective:      Physical Exam   Constitutional: She is oriented to person, place, and time. She appears well-developed and well-nourished.   HENT:   Head: Normocephalic and atraumatic. Head is without abrasion, without contusion and without laceration.   Right Ear: External ear normal.   Left Ear: External ear normal.   Nose: Nose normal.   Mouth/Throat: Oropharynx is clear and moist.   Eyes: Conjunctivae, EOM and lids are normal. Pupils are equal, round, and reactive to light.   Neck: Trachea normal, full passive range of motion without pain and phonation normal. Neck supple.   Cardiovascular: Normal rate.    Pulmonary/Chest: Effort normal. No stridor. No respiratory distress.   Musculoskeletal: Normal range of motion.   Neurological: She is alert and oriented to person, place, and time.   Skin: Skin is warm, dry and intact. Capillary refill takes less than 2 seconds. Rash (on right post thigh, right forearm, right breast) noted. No abrasion, no bruising, no burn, no ecchymosis, no laceration and no lesion noted. There is erythema.    Psychiatric: She has a normal mood and affect. Her speech is normal and behavior is normal. Judgment and thought content normal. Cognition and memory are normal.   Nursing note and vitals reviewed.      Assessment:       1. Rash and nonspecific skin eruption        Plan:         Rash and nonspecific skin eruption  -     methylPREDNISolone (MEDROL DOSEPACK) 4 mg tablet; use as directed  Dispense: 1 Package; Refill: 0

## 2017-07-27 ENCOUNTER — TELEPHONE (OUTPATIENT)
Dept: PULMONOLOGY | Facility: CLINIC | Age: 67
End: 2017-07-27

## 2017-07-27 DIAGNOSIS — J45.30 MILD PERSISTENT ASTHMA WITHOUT COMPLICATION: Primary | ICD-10-CM

## 2017-07-27 RX ORDER — FLUTICASONE FUROATE AND VILANTEROL 200; 25 UG/1; UG/1
1 POWDER RESPIRATORY (INHALATION) DAILY
Qty: 60 EACH | Refills: 11 | Status: SHIPPED | OUTPATIENT
Start: 2017-07-27 | End: 2017-08-04

## 2017-07-27 NOTE — LETTER
July 27, 2017    Tiana SHERYL Hiro  [address]  [place] [address]     [facility]  [address] [facility] [address] [place] [address]  Phone: [phone]  Fax: [phone] Dear Ms. Bosch:    Enclosed is a coupon for Breo that will hlep with payment of medication    If you have any questions or concerns, please don't hesitate to call.    Sincerely,        Jamie Romero MD

## 2017-07-27 NOTE — TELEPHONE ENCOUNTER
----- Message from Robin Echeverria sent at 7/27/2017 12:55 PM CDT -----  Contact: Santiago MARCUS In [phone]   Would like to see if a Rx can be changed to something more affordable for patient. Please call back at [phone].  Thanks-[facility]

## 2017-08-04 ENCOUNTER — OFFICE VISIT (OUTPATIENT)
Dept: FAMILY MEDICINE | Facility: CLINIC | Age: 67
End: 2017-08-04
Payer: MEDICARE

## 2017-08-04 VITALS
WEIGHT: 164 LBS | DIASTOLIC BLOOD PRESSURE: 74 MMHG | SYSTOLIC BLOOD PRESSURE: 136 MMHG | BODY MASS INDEX: 30.18 KG/M2 | HEIGHT: 62 IN | RESPIRATION RATE: 16 BRPM | HEART RATE: 72 BPM

## 2017-08-04 DIAGNOSIS — M79.7 FIBROMYALGIA: ICD-10-CM

## 2017-08-04 DIAGNOSIS — R29.898 WEAKNESS OF BOTH LOWER EXTREMITIES: ICD-10-CM

## 2017-08-04 DIAGNOSIS — E78.5 HYPERLIPIDEMIA LDL GOAL <70: ICD-10-CM

## 2017-08-04 DIAGNOSIS — M79.7 FIBROMYALGIA: Chronic | ICD-10-CM

## 2017-08-04 DIAGNOSIS — E11.42 TYPE 2 DIABETES MELLITUS WITH DIABETIC POLYNEUROPATHY, WITHOUT LONG-TERM CURRENT USE OF INSULIN: ICD-10-CM

## 2017-08-04 DIAGNOSIS — I10 ESSENTIAL HYPERTENSION: Primary | Chronic | ICD-10-CM

## 2017-08-04 DIAGNOSIS — J44.9 COPD, SEVERE: ICD-10-CM

## 2017-08-04 PROCEDURE — 4010F ACE/ARB THERAPY RXD/TAKEN: CPT | Mod: ,,, | Performed by: FAMILY MEDICINE

## 2017-08-04 PROCEDURE — 99214 OFFICE O/P EST MOD 30 MIN: CPT | Mod: S$PBB,,, | Performed by: FAMILY MEDICINE

## 2017-08-04 PROCEDURE — 99213 OFFICE O/P EST LOW 20 MIN: CPT | Mod: PBBFAC,PO | Performed by: FAMILY MEDICINE

## 2017-08-04 PROCEDURE — 99999 PR PBB SHADOW E&M-EST. PATIENT-LVL III: CPT | Mod: PBBFAC,,, | Performed by: FAMILY MEDICINE

## 2017-08-04 PROCEDURE — 3045F PR MOST RECENT HEMOGLOBIN A1C LEVEL 7.0-9.0%: CPT | Mod: ,,, | Performed by: FAMILY MEDICINE

## 2017-08-04 PROCEDURE — 3008F BODY MASS INDEX DOCD: CPT | Mod: ,,, | Performed by: FAMILY MEDICINE

## 2017-08-04 RX ORDER — TRAMADOL HYDROCHLORIDE 50 MG/1
TABLET ORAL
Qty: 180 TABLET | Refills: 0 | Status: SHIPPED | OUTPATIENT
Start: 2017-08-04 | End: 2017-08-30 | Stop reason: SDUPTHER

## 2017-08-04 RX ORDER — LISINOPRIL 5 MG/1
5 TABLET ORAL DAILY
Qty: 90 TABLET | Refills: 3 | Status: SHIPPED | OUTPATIENT
Start: 2017-08-04 | End: 2018-06-14 | Stop reason: SDUPTHER

## 2017-08-04 NOTE — PROGRESS NOTES
Subjective:       Patient ID: Tiana Bosch is a 67 y.o. female.    Chief Complaint: Establish Care (Patient here to establish care. )    Here to establish care.  Changing from [facility].  Due for labs.  Some balance issues and interested in PT.        Hypertension   This is a chronic problem. The current episode started more than 1 year ago. The problem is controlled. Pertinent negatives include no chest pain, palpitations or shortness of breath.   Hyperlipidemia   This is a chronic problem. The current episode started more than 1 year ago. The problem is controlled. Recent lipid tests were reviewed and are normal. Pertinent negatives include no chest pain or shortness of breath. Current antihyperlipidemic treatment includes statins.   Diabetes   She presents for her follow-up diabetic visit. She has type 2 diabetes mellitus. Pertinent negatives for diabetes include no chest pain and no fatigue. Current diabetic treatment includes oral agent (monotherapy).     Review of Systems   Constitutional: Negative for chills, fatigue and fever.   Respiratory: Negative for cough, chest tightness and shortness of breath.    Cardiovascular: Negative for chest pain, palpitations and leg swelling.   Gastrointestinal: Negative for abdominal distention and abdominal pain.   Endocrine: Negative for cold intolerance and heat intolerance.   Skin: Negative for rash.       Objective:      Physical Exam   Constitutional: She appears well-developed and well-nourished.   HENT:   Head: Normocephalic and atraumatic.   Cardiovascular: Normal rate, regular rhythm and normal heart sounds.    Pulmonary/Chest: Effort normal and breath sounds normal.   Abdominal: Soft. Bowel sounds are normal.   Neurological: No cranial nerve deficit. She exhibits normal muscle tone. Coordination normal.   Psychiatric: She has a normal mood and affect.   Nursing note and vitals reviewed.      Assessment:       1. Essential hypertension    2.  Hyperlipidemia LDL goal <70    3. Uncontrolled type 2 diabetes mellitus without complication, without long-term current use of insulin    4. Fibromyalgia    5. COPD, severe    6. Weakness of both lower extremities    7. Type 2 diabetes mellitus with diabetic polyneuropathy, without long-term current use of insulin        Plan:       Essential hypertension  -     CBC auto differential; Future; Expected date: 08/04/2017  -     Comprehensive metabolic panel; Future; Expected date: 08/04/2017  -     Lipid panel; Future; Expected date: 08/04/2017  -     TSH; Future; Expected date: 08/04/2017  -     Hemoglobin A1c; Future; Expected date: 08/04/2017  -     Microalbumin/creatinine urine ratio; Future; Expected date: 08/04/2017    Hyperlipidemia LDL goal <70  -     CBC auto differential; Future; Expected date: 08/04/2017  -     Comprehensive metabolic panel; Future; Expected date: 08/04/2017  -     Lipid panel; Future; Expected date: 08/04/2017  -     TSH; Future; Expected date: 08/04/2017  -     Hemoglobin A1c; Future; Expected date: 08/04/2017  -     Microalbumin/creatinine urine ratio; Future; Expected date: 08/04/2017    Uncontrolled type 2 diabetes mellitus without complication, without long-term current use of insulin  -     CBC auto differential; Future; Expected date: 08/04/2017  -     Comprehensive metabolic panel; Future; Expected date: 08/04/2017  -     Lipid panel; Future; Expected date: 08/04/2017  -     TSH; Future; Expected date: 08/04/2017  -     Hemoglobin A1c; Future; Expected date: 08/04/2017  -     Microalbumin/creatinine urine ratio; Future; Expected date: 08/04/2017  -     Ambulatory referral to Optometry    Fibromyalgia    COPD, severe    Weakness of both lower extremities  -     Ambulatory Referral to Physical/Occupational Therapy    Type 2 diabetes mellitus with diabetic polyneuropathy, without long-term current use of insulin    Other orders  -     lisinopril (PRINIVIL,ZESTRIL) 5 MG tablet; Take 1  tablet (5 mg total) by mouth once daily.  Dispense: 90 tablet; Refill: 3    start ace.  Update labs and health maintenance.    Return in about 4 months (around 12/4/2017), or if symptoms worsen or fail to improve.

## 2017-08-05 NOTE — TELEPHONE ENCOUNTER
Didn't know she was on this much tramadol; will need to see pain management likely; will refill today

## 2017-08-09 ENCOUNTER — LAB VISIT (OUTPATIENT)
Dept: LAB | Facility: HOSPITAL | Age: 67
End: 2017-08-09
Attending: FAMILY MEDICINE
Payer: MEDICARE

## 2017-08-09 DIAGNOSIS — E78.5 HYPERLIPIDEMIA LDL GOAL <70: ICD-10-CM

## 2017-08-09 DIAGNOSIS — I10 ESSENTIAL HYPERTENSION: Chronic | ICD-10-CM

## 2017-08-09 LAB
ALBUMIN SERPL BCP-MCNC: 3.9 G/DL
ALP SERPL-CCNC: 139 U/L
ALT SERPL W/O P-5'-P-CCNC: 21 U/L
ANION GAP SERPL CALC-SCNC: 14 MMOL/L
AST SERPL-CCNC: 20 U/L
BASOPHILS # BLD AUTO: 0.02 K/UL
BASOPHILS NFR BLD: 0.3 %
BILIRUB SERPL-MCNC: 0.3 MG/DL
BUN SERPL-MCNC: 22 MG/DL
CALCIUM SERPL-MCNC: 9.8 MG/DL
CHLORIDE SERPL-SCNC: 101 MMOL/L
CHOLEST/HDLC SERPL: 3.7 {RATIO}
CO2 SERPL-SCNC: 23 MMOL/L
CREAT SERPL-MCNC: 1 MG/DL
DIFFERENTIAL METHOD: NORMAL
EOSINOPHIL # BLD AUTO: 0.3 K/UL
EOSINOPHIL NFR BLD: 4.2 %
ERYTHROCYTE [DISTWIDTH] IN BLOOD BY AUTOMATED COUNT: 13.6 %
EST. GFR  (AFRICAN AMERICAN): >60 ML/MIN/1.73 M^2
EST. GFR  (NON AFRICAN AMERICAN): 59.3 ML/MIN/1.73 M^2
GLUCOSE SERPL-MCNC: 148 MG/DL
HCT VFR BLD AUTO: 39.9 %
HDL/CHOLESTEROL RATIO: 26.8 %
HDLC SERPL-MCNC: 142 MG/DL
HDLC SERPL-MCNC: 38 MG/DL
HGB BLD-MCNC: 13.3 G/DL
LDLC SERPL CALC-MCNC: 70.2 MG/DL
LYMPHOCYTES # BLD AUTO: 1.7 K/UL
LYMPHOCYTES NFR BLD: 28.4 %
MCH RBC QN AUTO: 30.8 PG
MCHC RBC AUTO-ENTMCNC: 33.3 G/DL
MCV RBC AUTO: 92 FL
MONOCYTES # BLD AUTO: 0.6 K/UL
MONOCYTES NFR BLD: 10.4 %
NEUTROPHILS # BLD AUTO: 3.4 K/UL
NEUTROPHILS NFR BLD: 56.4 %
NONHDLC SERPL-MCNC: 104 MG/DL
PLATELET # BLD AUTO: 282 K/UL
PMV BLD AUTO: 9.7 FL
POTASSIUM SERPL-SCNC: 5 MMOL/L
PROT SERPL-MCNC: 7.6 G/DL
RBC # BLD AUTO: 4.32 M/UL
SODIUM SERPL-SCNC: 138 MMOL/L
TRIGL SERPL-MCNC: 169 MG/DL
TSH SERPL DL<=0.005 MIU/L-ACNC: 2.46 UIU/ML
WBC # BLD AUTO: 5.98 K/UL

## 2017-08-09 PROCEDURE — 85025 COMPLETE CBC W/AUTO DIFF WBC: CPT

## 2017-08-09 PROCEDURE — 80053 COMPREHEN METABOLIC PANEL: CPT

## 2017-08-09 PROCEDURE — 36415 COLL VENOUS BLD VENIPUNCTURE: CPT | Mod: PO

## 2017-08-09 PROCEDURE — 80061 LIPID PANEL: CPT

## 2017-08-09 PROCEDURE — 84443 ASSAY THYROID STIM HORMONE: CPT

## 2017-08-09 PROCEDURE — 83036 HEMOGLOBIN GLYCOSYLATED A1C: CPT

## 2017-08-10 LAB
ESTIMATED AVG GLUCOSE: 174 MG/DL
HBA1C MFR BLD HPLC: 7.7 %

## 2017-08-18 DIAGNOSIS — Z12.11 COLON CANCER SCREENING: ICD-10-CM

## 2017-08-18 DIAGNOSIS — M79.7 FIBROMYALGIA: ICD-10-CM

## 2017-08-18 RX ORDER — CYCLOBENZAPRINE HCL 10 MG
TABLET ORAL
Qty: 90 TABLET | Refills: 0 | Status: SHIPPED | OUTPATIENT
Start: 2017-08-18 | End: 2019-01-01

## 2017-08-19 DIAGNOSIS — M79.7 FIBROMYALGIA: ICD-10-CM

## 2017-08-24 RX ORDER — MONTELUKAST SODIUM 10 MG/1
10 TABLET ORAL NIGHTLY
Qty: 90 TABLET | Refills: 2 | Status: SHIPPED | OUTPATIENT
Start: 2017-08-24 | End: 2017-09-18 | Stop reason: SDUPTHER

## 2017-08-25 DIAGNOSIS — M79.7 FIBROMYALGIA: ICD-10-CM

## 2017-08-25 RX ORDER — TRAMADOL HYDROCHLORIDE 50 MG/1
TABLET ORAL
Qty: 180 TABLET | Refills: 0 | OUTPATIENT
Start: 2017-08-25

## 2017-08-25 RX ORDER — TRAMADOL HYDROCHLORIDE 50 MG/1
100 TABLET ORAL EVERY 8 HOURS PRN
Qty: 180 TABLET | Refills: 0 | Status: CANCELLED | OUTPATIENT
Start: 2017-08-25

## 2017-08-30 DIAGNOSIS — M79.7 FIBROMYALGIA: ICD-10-CM

## 2017-08-30 NOTE — TELEPHONE ENCOUNTER
Patient has agreed to pain management referral, which is pended for review.     Patient had PT referral in 7/2017 with no follow up from therapy provider. Is requesting that new referral be put in due to balance and coordination issues related to gait and pain.     Patient is also requesting refill for Ultram, which was previously denied due to a request that was too soon and need for pain management. The dosage was confirmed.     Explained that this medication would not be refilled any sooner that 5-7 days before its due date due to the nature of the medication. Also explained that pain management would be needed to evaluate its continued use.   Voiced understanding.     Please advise.

## 2017-09-01 RX ORDER — TRAMADOL HYDROCHLORIDE 50 MG/1
100 TABLET ORAL EVERY 8 HOURS PRN
Qty: 90 TABLET | Refills: 0 | Status: SHIPPED | OUTPATIENT
Start: 2017-09-01

## 2017-09-01 NOTE — TELEPHONE ENCOUNTER
Done; she needs to call and schedule; here is the deal; just because previous doc gave her meds doesn't mean all will continue; sending #90 only

## 2017-09-01 NOTE — TELEPHONE ENCOUNTER
I have contacted patient and informed her of the referral to Dr Hays. Previous referral pended did not go through correctly. Please sign pended referral.

## 2017-09-08 ENCOUNTER — TELEPHONE (OUTPATIENT)
Dept: FAMILY MEDICINE | Facility: CLINIC | Age: 67
End: 2017-09-08

## 2017-09-08 NOTE — TELEPHONE ENCOUNTER
----- Message from Jessica Alfred sent at 9/8/2017  2:46 PM CDT -----  Contact: Ernesto Ayers call to ernesto bailey with [facility] pharmacy called regarding tramadol stated that need a verbal. Patient is at pharmacy waiting for script please call back at [phone]. Thanks

## 2017-09-12 ENCOUNTER — TELEPHONE (OUTPATIENT)
Dept: FAMILY MEDICINE | Facility: CLINIC | Age: 67
End: 2017-09-12

## 2017-09-12 NOTE — TELEPHONE ENCOUNTER
Spoke to pt. Pt states she picked up her tramadol for the pharmacy and states they only gave her 90 tablets. Pt states that will only last her about 1/2 a month because she states she takes 3 tablets 3 times a day. Please advise.

## 2017-09-12 NOTE — TELEPHONE ENCOUNTER
----- Message from Chilango Nicole sent at 9/11/2017 10:26 AM CDT -----  Contact: Tiana   Calling about having a quantity of 90 for her Tramadol. Please call back [phone] thanks!

## 2017-09-12 NOTE — TELEPHONE ENCOUNTER
Saw her once as new patient and she has to realize not every doctor writes that quantity; she was referred to pain management and told we wouldn't just refill her meds

## 2017-09-18 ENCOUNTER — OFFICE VISIT (OUTPATIENT)
Dept: OPTOMETRY | Facility: CLINIC | Age: 67
End: 2017-09-18
Payer: COMMERCIAL

## 2017-09-18 DIAGNOSIS — Z13.5 GLAUCOMA SCREENING: ICD-10-CM

## 2017-09-18 DIAGNOSIS — Z96.1 BILATERAL PSEUDOPHAKIA: ICD-10-CM

## 2017-09-18 DIAGNOSIS — H35.3130 NONEXUDATIVE AGE-RELATED MACULAR DEGENERATION, BILATERAL, STAGE UNSPECIFIED: ICD-10-CM

## 2017-09-18 DIAGNOSIS — E11.9 DIABETES MELLITUS TYPE 2 WITHOUT RETINOPATHY: Primary | ICD-10-CM

## 2017-09-18 DIAGNOSIS — H43.813 POSTERIOR VITREOUS DETACHMENT, BILATERAL: ICD-10-CM

## 2017-09-18 PROCEDURE — 99999 PR PBB SHADOW E&M-EST. PATIENT-LVL III: CPT | Mod: PBBFAC,,, | Performed by: OPTOMETRIST

## 2017-09-18 PROCEDURE — 99213 OFFICE O/P EST LOW 20 MIN: CPT | Mod: PBBFAC,PO | Performed by: OPTOMETRIST

## 2017-09-18 PROCEDURE — 92014 COMPRE OPH EXAM EST PT 1/>: CPT | Mod: S$GLB,,, | Performed by: OPTOMETRIST

## 2017-09-18 PROCEDURE — 92015 DETERMINE REFRACTIVE STATE: CPT | Mod: S$GLB,,, | Performed by: OPTOMETRIST

## 2017-09-18 RX ORDER — MONTELUKAST SODIUM 10 MG/1
10 TABLET ORAL NIGHTLY
Qty: 90 TABLET | Refills: 0 | Status: SHIPPED | OUTPATIENT
Start: 2017-09-18 | End: 2017-10-24 | Stop reason: SDUPTHER

## 2017-09-18 NOTE — PATIENT INSTRUCTIONS
DIABETES AND THE EYE / DIABETIC RETINOPATHY    Diabetic retinopathy is a condition occurring in persons with diabetes, which causes progressive damage to the retina, the light sensitive lining at the back of the eye. It is a serious sight-threatening complication of diabetes.    Diabetic retinopathy is the result of damage to the tiny blood vessels that nourish the retina. They leak blood and other fluids that cause swelling of retinal tissue and clouding of vision. The condition usually affects both eyes. The longer a person has diabetes, the more likely they will develop diabetic retinopathy. If left untreated, diabetic retinopathy can cause blindness.  There are two basic types of diabetic retinopathy:    Background or nonproliferative diabetic retinopathy (NPDR)  Nonproliferative diabetic retinopathy (NPDR) is the earliest stage of diabetic retinopathy. With this condition, damaged blood vessels in the retina begin to leak extra fluid and small amounts of blood into the eye. Sometimes, deposits of cholesterol or other fats from the blood may leak into the retina. Many people with diabetes have mild NPDR, which usually does not affect their vision. However, if their vision is affected, it is the result of macular edema and macular ischemia.    If vision is affected due to macular changes, a consult with a Retina Specialist may be advised.  This is an ophthalmologist that treats retina conditions, including diabetic retinopathy.     Proliferative diabetic retinopathy (PDR)  Proliferative diabetic retinopathy (PDR) mainly occurs when many of the blood vessels in the retina close, preventing enough blood flow. In an attempt to supply blood to the area where the original vessels closed, the retina responds by growing new blood vessels. This is called neovascularization. However, these new blood vessels are abnormal and do not supply the retina with proper blood flow. The new vessels are also often accompanied by scar  tissue that may cause the retina to wrinkle or detach. PDR may cause more severe vision loss than NPDR because it can affect both central and peripheral vision.     A patient diagnosed with proliferative diabetic eye disease will be referred to a retinal specialist for consultation.    Often there are no visual symptoms in the early stages of diabetic retinopathy. That is why our eye care professionals recommend that everyone with diabetes have a comprehensive dilated eye examination once a year. Early detection and treatment can limit the potential for significant vision loss from diabetic retinopathy.        Using the Amsler Grid  If you are at risk for vision loss, you may be told to check your eyesight regularly using the Amsler grid. Below is the grid and instructions for using it.         The Amsler grid helps you track changes in your vision.    How to Use the Amsler Grid  1. Use the grid in a well-lighted area.  2. Wear glasses or contact lenses if you usually wear them.  3. Hold the grid at your normal reading distance (about 16 inches).  4. Cover your left eye.  5. With your right eye, look at the dot in the center of the grid.  6. While looking at the dot, notice if any of the lines look wavy, if any lines disappear, or if the boxes change shape.  7. Write down on a piece of paper any vision changes from the last time you used the grid.  8. Now repeat the exercise, this time covering your right eye.  9. Call your doctor right away if you notice any vision changes.  How Often Should I Check My Vision?  Use the Amsler grid as often as your eye doctor suggests. Keep the grid where youll remember to use it. Call your eye doctor right away if you notice any changes with your eyesight. This includes if your vision improves.  © 8370-8773 [employer]off, [address] [place] [address], [place] [address]. All rights reserved. This information is not intended as a substitute for professional medical care. Always follow  "your healthcare professional's instructions.FLASHES / FLOATERS / POSTERIOR VITREOUS DETACHMENT    Call the clinic if you have any further changes in symptoms.  Including:  Increased numbers of floaters or flashing lights, dimness or darkness that moves through or stays constant in your vision, or any pain in the eye (s).            DRY EYES:  Use Over The Counter artificial tears as needed for dry eye symptoms.  Some common brands include:  Systane, Optive, and Refresh.  These drops can be used as frequently as desired, but may be most helpful use during long periods of concentrated work.  For example, reading / working at the computer.  Avoid drops that "get redness out", as these contain medication that may further irritate the eyes.    ALLERGY EYES / SYMPTOMS:    Over the counter medications include--Zaditor and Alaway  Use as directed 1-2 drops daily for symptoms of itching / watering eyes.  These drops will not help for dry eye or exposure symptoms.      "

## 2017-09-18 NOTE — LETTER
September 18, 2017      MARY JO Malone MD  [address] [facility] [address] [place] [address]           [facility]  [address] [facility] [address] [place] [address]  Phone: [phone]  Fax: [phone]          Patient: Tiana Bosch   MR Number: [id number]   YOB: 1950   Date of Visit: 9/18/2017       Dear Dr. MARY JO Malone:    Thank you for referring Tiana Bosch to me for evaluation. Attached you will find relevant portions of my assessment and plan of care.    If you have questions, please do not hesitate to call me. I look forward to following Tiana Bosch along with you.    Sincerely,    LO Jaramillo, OD    Enclosure  CC:  No Recipients    If you would like to receive this communication electronically, please contact [email or web address] or [phone] to request more information on [employer] access.    For providers and/or their staff who would like to refer a patient to [facility], please contact us through our one-stop-shop provider referral line, [facility], at [phone].    If you feel you have received this communication in error or would no longer like to receive these types of communications, please e-mail [email or web address]

## 2017-09-18 NOTE — PROGRESS NOTES
HPI     Annual Exam    Additional comments: DLE 9-16 (christoph)   ocular health exam             Diabetic Eye Exam    Additional comments: BSL fluctuates           Headache    Additional comments: occasionally over OU           Dry Eye    Additional comments: +Systane gtts OU bid-qid           Blurred Vision    Additional comments: at both near & distance            Spots and/or Floaters    Additional comments: OU -- no light flashes           Comments   Hemoglobin A1C       Date                     Value               Ref Range             Status                08/09/2017               7.7 (H)             4.0 - 5.6 %           Final              Comment:    According to ADA guidelines, hemoglobin A1c <7.0% represents  optimal   control in non-pregnant diabetic patients. Different  metrics may apply to   specific patient populations.   Standards of Medical Care in   Diabetes-2016.  For the purpose of screening for the presence of   diabetes:  <5.7%     Consistent with the absence of diabetes  5.7-6.4%    Consistent with increasing risk for diabetes   (prediabetes)  >or=6.5%    Consistent with diabetes  Currently, no consensus exists for use of   hemoglobin A1c  for diagnosis of diabetes for children.  This Hemoglobin   A1c assay has significant interference with fetal   hemoglobin   (HbF).   The results are invalid for patients with abnormal amounts of   HbF,     including those with known Hereditary Persistence   of Fetal Hemoglobin.   Heterozygous hemoglobin variants (HbAS, HbAC,   HbAD, HbAE, HbA2) do not   significantly interfere with this assay;   however, presence of multiple   variants in a sample may impact the %   interference.         05/19/2017               8.8 (H)             4.5 - 6.2 %           Final              Comment:    According to ADA guidelines, hemoglobin A1C <7.0% represents  optimal   control in non-pregnant diabetic patients.  Different  metrics may apply   to specific populations.    Standards of Medical Care in Diabetes -   2016.  For the purpose of screening for the presence of diabetes:  <5.7%       Consistent with the absence of diabetes  5.7-6.4%  Consistent with   increasing risk for diabetes   (prediabetes)  >or=6.5%  Consistent with   diabetes  Currently no consensus exists for use of hemoglobin A1C  for   diagnosis of diabetes for children.         09/26/2016               7.7 (H)             4.5 - 6.2 %           Final              Comment:    According to ADA guidelines, hemoglobin A1C <7.0% represents  optimal   control in non-pregnant diabetic patients.  Different  metrics may apply   to specific populations.   Standards of Medical Care in Diabetes -   2016.  For the purpose of screening for the presence of diabetes:  <5.7%       Consistent with the absence of diabetes  5.7-6.4%  Consistent with   increasing risk for diabetes   (prediabetes)  >or=6.5%  Consistent with   diabetes  Currently no consensus exists for use of hemoglobin A1C  for   diagnosis of diabetes for children.    ----------    DM 2 30 + years  Diet exercise for decades // recently to PO meds  Feels VA slightlyl reduced  BP reported moderate  Last seen 2016 Dr Torrez in [place]  H/o ? avastin outside [facility] for CME following CE       Last edited by LO Jaramillo, OD on 9/18/2017  9:49 AM. (History)        ROS     Positive for: Eyes    Negative for: Constitutional, Gastrointestinal, Neurological, Skin,   Genitourinary, Musculoskeletal, HENT, Endocrine, Cardiovascular,   Respiratory, Psychiatric, Allergic/Imm, Heme/Lymph    Last edited by LO Jaramillo, OD on 9/18/2017  9:02 AM. (History)        Assessment /Plan     For exam results, see Encounter Report.    Diabetes mellitus type 2 without retinopathy    Posterior vitreous detachment, bilateral    Glaucoma screening    Nonexudative age-related macular degeneration, bilateral, stage unspecified    Bilateral pseudophakia      1. No ret/ no csme, gave info, control  glucose, annual DFE  2. RD precautions given  3. Not suspect  4. ? Old CME w/ pig changes minimal, mild ERM OD>OS  VA stable from previous  Areds/ amsler  Consider OCT next exam new baseline  5. Stable OU    Discussed and educated patient on current findings /plan.  RTC 1 year, prn if any changes / issues

## 2017-09-22 ENCOUNTER — OFFICE VISIT (OUTPATIENT)
Dept: URGENT CARE | Facility: CLINIC | Age: 67
End: 2017-09-22
Payer: MEDICARE

## 2017-09-22 VITALS
WEIGHT: 164 LBS | HEART RATE: 85 BPM | SYSTOLIC BLOOD PRESSURE: 160 MMHG | DIASTOLIC BLOOD PRESSURE: 78 MMHG | OXYGEN SATURATION: 95 % | HEIGHT: 62 IN | BODY MASS INDEX: 30.18 KG/M2 | RESPIRATION RATE: 18 BRPM | TEMPERATURE: 97 F

## 2017-09-22 DIAGNOSIS — R30.0 DYSURIA: Primary | ICD-10-CM

## 2017-09-22 LAB
BILIRUB SERPL-MCNC: NEGATIVE MG/DL
BLOOD, POC UA: NEGATIVE
GLUCOSE UR QL STRIP: NEGATIVE
KETONES UR QL STRIP: NEGATIVE
LEUKOCYTE ESTERASE URINE, POC: 25
NITRITE, POC UA: NEGATIVE
PH, POC UA: 5.5 (ref 5–8)
PROTEIN, POC: NEGATIVE
SPECIFIC GRAVITY, POC UA: 1.03 (ref 1–1.03)
UROBILINOGEN, POC UA: NORMAL

## 2017-09-22 PROCEDURE — 3077F SYST BP >= 140 MM HG: CPT | Mod: S$GLB,,, | Performed by: FAMILY MEDICINE

## 2017-09-22 PROCEDURE — 81000 URINALYSIS NONAUTO W/SCOPE: CPT | Mod: S$GLB,,, | Performed by: FAMILY MEDICINE

## 2017-09-22 PROCEDURE — 99214 OFFICE O/P EST MOD 30 MIN: CPT | Mod: 25,S$GLB,, | Performed by: FAMILY MEDICINE

## 2017-09-22 PROCEDURE — 3078F DIAST BP <80 MM HG: CPT | Mod: S$GLB,,, | Performed by: FAMILY MEDICINE

## 2017-09-22 RX ORDER — PHENAZOPYRIDINE HYDROCHLORIDE 200 MG/1
200 TABLET, FILM COATED ORAL 3 TIMES DAILY PRN
Qty: 6 TABLET | Refills: 2 | Status: SHIPPED | OUTPATIENT
Start: 2017-09-22 | End: 2017-09-24

## 2017-09-22 RX ORDER — NITROFURANTOIN 25; 75 MG/1; MG/1
100 CAPSULE ORAL 2 TIMES DAILY
Qty: 10 CAPSULE | Refills: 1 | Status: SHIPPED | OUTPATIENT
Start: 2017-09-22 | End: 2017-09-27

## 2017-09-22 NOTE — PROGRESS NOTES
"Subjective:       Patient ID: Tiana Bosch is a 67 y.o. female.    Vitals:  height is 5' 2" (1.575 m) and weight is 74.4 kg (164 lb). Her temperature is 97.4 °F (36.3 °C). Her blood pressure is 160/78 (abnormal) and her pulse is 85. Her respiration is 18 and oxygen saturation is 95%.     Chief Complaint: Urinary Tract Infection    Urinary Tract Infection    This is a new problem. The current episode started in the past 7 days. The problem has been unchanged. The quality of the pain is described as aching. There has been no fever. Associated symptoms include frequency and urgency. Pertinent negatives include no chills, hematuria, nausea or vomiting.     Review of Systems   Constitution: Negative for chills and fever.   Musculoskeletal: Negative for back pain.   Gastrointestinal: Negative for abdominal pain, nausea and vomiting.   Genitourinary: Positive for dysuria, frequency, pelvic pain and urgency. Negative for genital sores, hematuria, missed menses and non-menstrual bleeding.       Objective:      Physical Exam   Constitutional: She is oriented to person, place, and time. She appears well-developed and well-nourished.   HENT:   Head: Normocephalic and atraumatic.   Eyes: EOM are normal. Pupils are equal, round, and reactive to light.   Neck: Normal range of motion.   Abdominal: There is tenderness in the suprapubic area.   Neurological: She is alert and oriented to person, place, and time.   Skin: Skin is warm and dry.   Psychiatric: She has a normal mood and affect. Her behavior is normal. Thought content normal.       Assessment:       1. Dysuria        Plan:         Dysuria  -     POCT URINALYSIS  -     Urine culture    Other orders  -     nitrofurantoin, macrocrystal-monohydrate, (MACROBID) 100 MG capsule; Take 1 capsule (100 mg total) by mouth 2 (two) times daily.  Dispense: 10 capsule; Refill: 1  -     phenazopyridine (PYRIDIUM) 200 MG tablet; Take 1 tablet (200 mg total) by mouth 3 (three) times daily " as needed for Pain.  Dispense: 6 tablet; Refill: 2

## 2017-09-29 ENCOUNTER — OFFICE VISIT (OUTPATIENT)
Dept: OPTOMETRY | Facility: CLINIC | Age: 67
End: 2017-09-29
Payer: COMMERCIAL

## 2017-09-29 DIAGNOSIS — H53.8 BLURRED VISION, BILATERAL: Primary | ICD-10-CM

## 2017-09-29 LAB
BACTERIA UR CULT: ABNORMAL
OTHER ANTIBIOTIC SUSC ISLT: ABNORMAL

## 2017-09-29 PROCEDURE — 99499 UNLISTED E&M SERVICE: CPT | Mod: S$PBB,,, | Performed by: OPTOMETRIST

## 2017-09-29 NOTE — PROGRESS NOTES
HPI     Blurred Vision    Additional comments: at both near & distance --- pt had specs made here,   tried wearing for about 3 days & unable to see clearly       Last edited by Michelle Bose on 9/29/2017  9:25 AM. (History)            Assessment /Plan     For exam results, see Encounter Report.    Blurred vision, bilateral      Recheck refraction  Prefers old specs RX  Gave copy for remake

## 2017-10-04 ENCOUNTER — TELEPHONE (OUTPATIENT)
Dept: URGENT CARE | Facility: CLINIC | Age: 67
End: 2017-10-04

## 2017-10-04 NOTE — TELEPHONE ENCOUNTER
----- Message from Maverick Cabrera MD sent at 9/29/2017  4:34 PM CDT -----  Call pt and see how feeling. On correct medication

## 2017-10-10 DIAGNOSIS — I25.10 CORONARY ARTERY DISEASE INVOLVING NATIVE CORONARY ARTERY WITHOUT ANGINA PECTORIS, UNSPECIFIED WHETHER NATIVE OR TRANSPLANTED HEART: ICD-10-CM

## 2017-10-10 DIAGNOSIS — I10 ESSENTIAL HYPERTENSION: ICD-10-CM

## 2017-10-10 RX ORDER — METOPROLOL TARTRATE 25 MG/1
TABLET, FILM COATED ORAL
Qty: 60 TABLET | Refills: 6 | Status: SHIPPED | OUTPATIENT
Start: 2017-10-10 | End: 2018-04-19 | Stop reason: SDUPTHER

## 2017-10-12 ENCOUNTER — OFFICE VISIT (OUTPATIENT)
Dept: CARDIOLOGY | Facility: CLINIC | Age: 67
End: 2017-10-12
Payer: MEDICARE

## 2017-10-12 VITALS
SYSTOLIC BLOOD PRESSURE: 150 MMHG | HEIGHT: 62 IN | HEART RATE: 84 BPM | BODY MASS INDEX: 30.07 KG/M2 | WEIGHT: 163.38 LBS | DIASTOLIC BLOOD PRESSURE: 72 MMHG

## 2017-10-12 DIAGNOSIS — R29.898 BILATERAL LEG WEAKNESS: ICD-10-CM

## 2017-10-12 DIAGNOSIS — R06.00 DYSPNEA, UNSPECIFIED TYPE: ICD-10-CM

## 2017-10-12 DIAGNOSIS — J96.11 CHRONIC RESPIRATORY FAILURE WITH HYPOXIA: ICD-10-CM

## 2017-10-12 DIAGNOSIS — E78.5 HYPERLIPIDEMIA LDL GOAL <70: ICD-10-CM

## 2017-10-12 DIAGNOSIS — I10 ESSENTIAL HYPERTENSION: Chronic | ICD-10-CM

## 2017-10-12 DIAGNOSIS — I25.10 CORONARY ARTERY DISEASE INVOLVING NATIVE CORONARY ARTERY OF NATIVE HEART WITHOUT ANGINA PECTORIS: Primary | Chronic | ICD-10-CM

## 2017-10-12 DIAGNOSIS — J44.9 COPD, SEVERE: ICD-10-CM

## 2017-10-12 DIAGNOSIS — G47.30 SLEEP-DISORDERED BREATHING: ICD-10-CM

## 2017-10-12 PROCEDURE — 99214 OFFICE O/P EST MOD 30 MIN: CPT | Mod: S$PBB,,, | Performed by: INTERNAL MEDICINE

## 2017-10-12 PROCEDURE — 99213 OFFICE O/P EST LOW 20 MIN: CPT | Mod: PBBFAC,25 | Performed by: INTERNAL MEDICINE

## 2017-10-12 PROCEDURE — 93010 ELECTROCARDIOGRAM REPORT: CPT | Mod: S$PBB,,, | Performed by: INTERNAL MEDICINE

## 2017-10-12 PROCEDURE — 93005 ELECTROCARDIOGRAM TRACING: CPT | Mod: PBBFAC | Performed by: INTERNAL MEDICINE

## 2017-10-12 PROCEDURE — 99999 PR PBB SHADOW E&M-EST. PATIENT-LVL III: CPT | Mod: PBBFAC,,, | Performed by: INTERNAL MEDICINE
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
## 2017-10-23 ENCOUNTER — TELEPHONE (OUTPATIENT)
Dept: PULMONOLOGY | Facility: CLINIC | Age: 67
End: 2017-10-23

## 2017-10-23 ENCOUNTER — PATIENT MESSAGE (OUTPATIENT)
Dept: PULMONOLOGY | Facility: CLINIC | Age: 67
End: 2017-10-23

## 2017-10-23 NOTE — TELEPHONE ENCOUNTER
----- Message from Rebecca White LPN sent at 10/20/2017  3:35 PM CDT -----  Make appt with [facility] to re qualify for oxygen

## 2017-10-24 ENCOUNTER — HOSPITAL ENCOUNTER (OUTPATIENT)
Dept: RADIOLOGY | Facility: HOSPITAL | Age: 67
Discharge: HOME | End: 2017-10-24
Attending: INTERNAL MEDICINE
Payer: MEDICARE

## 2017-10-24 ENCOUNTER — CLINICAL SUPPORT (OUTPATIENT)
Dept: CARDIOLOGY | Facility: CLINIC | Age: 67
End: 2017-10-24
Payer: MEDICARE

## 2017-10-24 ENCOUNTER — OFFICE VISIT (OUTPATIENT)
Dept: PULMONOLOGY | Facility: CLINIC | Age: 67
End: 2017-10-24
Payer: MEDICARE

## 2017-10-24 ENCOUNTER — PROCEDURE VISIT (OUTPATIENT)
Dept: PULMONOLOGY | Facility: CLINIC | Age: 67
End: 2017-10-24
Payer: MEDICARE

## 2017-10-24 VITALS
BODY MASS INDEX: 30.02 KG/M2 | BODY MASS INDEX: 30.02 KG/M2 | WEIGHT: 163.13 LBS | HEIGHT: 62 IN | SYSTOLIC BLOOD PRESSURE: 115 MMHG | DIASTOLIC BLOOD PRESSURE: 71 MMHG | WEIGHT: 163.13 LBS | OXYGEN SATURATION: 95 % | HEART RATE: 85 BPM | HEIGHT: 62 IN

## 2017-10-24 DIAGNOSIS — E78.5 HYPERLIPIDEMIA LDL GOAL <70: ICD-10-CM

## 2017-10-24 DIAGNOSIS — J30.0 VASOMOTOR RHINITIS, UNSPECIFIED CHRONICITY: ICD-10-CM

## 2017-10-24 DIAGNOSIS — J96.11 CHRONIC RESPIRATORY FAILURE WITH HYPOXIA: ICD-10-CM

## 2017-10-24 DIAGNOSIS — J44.9 COPD, SEVERE: ICD-10-CM

## 2017-10-24 DIAGNOSIS — I25.10 CORONARY ARTERY DISEASE INVOLVING NATIVE CORONARY ARTERY OF NATIVE HEART WITHOUT ANGINA PECTORIS: Chronic | ICD-10-CM

## 2017-10-24 DIAGNOSIS — I27.21 PAH (PULMONARY ARTERY HYPERTENSION): ICD-10-CM

## 2017-10-24 DIAGNOSIS — G47.30 SLEEP-DISORDERED BREATHING: ICD-10-CM

## 2017-10-24 DIAGNOSIS — J44.9 CHRONIC OBSTRUCTIVE PULMONARY DISEASE, UNSPECIFIED COPD TYPE: ICD-10-CM

## 2017-10-24 DIAGNOSIS — J44.89 ASTHMA WITH COPD: ICD-10-CM

## 2017-10-24 DIAGNOSIS — J30.89 CHRONIC NONSEASONAL ALLERGIC RHINITIS DUE TO OTHER ALLERGEN: ICD-10-CM

## 2017-10-24 DIAGNOSIS — R09.02 EXERCISE HYPOXEMIA: Primary | ICD-10-CM

## 2017-10-24 LAB
DIASTOLIC DYSFUNCTION: NO
DIASTOLIC DYSFUNCTION: NO
ESTIMATED PA SYSTOLIC PRESSURE: 58.35
MITRAL VALVE REGURGITATION: ABNORMAL
RETIRED EF AND QEF - SEE NOTES: 60 (ref 55–65)
TRICUSPID VALVE REGURGITATION: ABNORMAL
VASCULAR ANKLE BRACHIAL INDEX (ABI) LEFT: 1.01 (ref 0.9–1.2)

## 2017-10-24 PROCEDURE — 93306 TTE W/DOPPLER COMPLETE: CPT | Mod: PBBFAC,PO | Performed by: INTERNAL MEDICINE

## 2017-10-24 PROCEDURE — 93018 CV STRESS TEST I&R ONLY: CPT | Mod: S$PBB,,, | Performed by: INTERNAL MEDICINE

## 2017-10-24 PROCEDURE — A9502 TC99M TETROFOSMIN: HCPCS | Mod: PO

## 2017-10-24 PROCEDURE — 94620 PR PULMONARY STRESS TESTING,SIMPLE: CPT | Mod: 26,S$PBB,, | Performed by: INTERNAL MEDICINE

## 2017-10-24 PROCEDURE — 78452 HT MUSCLE IMAGE SPECT MULT: CPT | Mod: TC,PO

## 2017-10-24 PROCEDURE — 93924 LWR XTR VASC STDY BILAT: CPT | Mod: PBBFAC,PO | Performed by: INTERNAL MEDICINE

## 2017-10-24 PROCEDURE — 78452 HT MUSCLE IMAGE SPECT MULT: CPT | Mod: 26,,, | Performed by: INTERNAL MEDICINE

## 2017-10-24 PROCEDURE — 99999 PR PBB SHADOW E&M-EST. PATIENT-LVL V: CPT | Mod: PBBFAC,,, | Performed by: INTERNAL MEDICINE

## 2017-10-24 PROCEDURE — 94620 PR PULMONARY STRESS TESTING,SIMPLE: CPT | Mod: PBBFAC

## 2017-10-24 PROCEDURE — 93016 CV STRESS TEST SUPVJ ONLY: CPT | Mod: S$PBB,,, | Performed by: INTERNAL MEDICINE

## 2017-10-24 PROCEDURE — 99215 OFFICE O/P EST HI 40 MIN: CPT | Mod: 25,S$PBB,, | Performed by: INTERNAL MEDICINE

## 2017-10-24 PROCEDURE — 94060 EVALUATION OF WHEEZING: CPT | Mod: PBBFAC,59

## 2017-10-24 PROCEDURE — G0008 ADMIN INFLUENZA VIRUS VAC: HCPCS | Mod: PBBFAC

## 2017-10-24 PROCEDURE — 99215 OFFICE O/P EST HI 40 MIN: CPT | Mod: PBBFAC,25 | Performed by: INTERNAL MEDICINE

## 2017-10-24 PROCEDURE — 94060 EVALUATION OF WHEEZING: CPT | Mod: 26,59,S$PBB, | Performed by: INTERNAL MEDICINE

## 2017-10-24 RX ORDER — MONTELUKAST SODIUM 10 MG/1
10 TABLET ORAL NIGHTLY
Qty: 90 TABLET | Refills: 3 | Status: SHIPPED | OUTPATIENT
Start: 2017-10-24 | End: 2018-08-14 | Stop reason: SDUPTHER

## 2017-10-24 RX ORDER — IPRATROPIUM BROMIDE 42 UG/1
2 SPRAY, METERED NASAL 4 TIMES DAILY
Qty: 15 ML | Refills: 11 | Status: SHIPPED | OUTPATIENT
Start: 2017-10-24 | End: 2019-01-01

## 2017-10-24 NOTE — PROCEDURES
"[facility]  Six Minute Walk     SUMMARY     Ordering Provider: Dr. Romero   Interpreting Provider: Dr. Romero  Performing nurse/tech/RT: Berenice RRT  Diagnosis:  (Asthma with COPD)  Height: 5' 2" (157.5 cm)  Weight: 74 kg (163 lb 2.3 oz)  BMI (Calculated): 29.9   Patient Race:             Phase Oxygen Assessment Supplemental O2 Heart   Rate Blood Pressure [place] Dyspnea Scale Rating   Resting 95 % Room Air 85 bpm 115/71 4   Exercise        Minute        1 87 % Room Air 95 bpm     2 94 % 2 L/M 93 bpm     3 91 % 2 L/M 98 bpm     4 90 % 2 L/M 100 bpm     5 91 % 2 L/M 98 bpm     6  91 % 2 L/M 100 bpm 133/65 4   Recovery        Minute        1 92 % 2 L/M 98 bpm     2 97 % 2 L/M 89 bpm     3 98 % 2 L/M 86 bpm     4 98 % 2 L/M 82 bpm 126/58 3     Six Minute Walk Summary  6MWT Status: completed without stopping  Patient Reported: Dyspnea, Leg pain     Interpretation:  Did the patient stop or pause?: No           Total Time Walked (Calculated): 360 seconds  Final Partial Lap Distance (feet): 100 feet  Total Distance Meters (Calculated): 213.36 meters  Predicted Distance Meters (Calculated): 454.17 meters  Percentage of Predicted (Calculated): 46.98  Peak VO2 (Calculated): 10.38  Mets: 2.97  Has The Patient Had a Previous Six Minute Walk Test?: Yes       Previous 6MWT Results  Has The Patient Had a Previous Six Minute Walk Test?: Yes  Date of Previous Test: 10/13/15  Total Time Walked: 316.8 seconds  Total Distance (meters): 304.8  Predicted Distance (meters): 466.5 meters  Percentage of Predicted: 65  Percent Change (Calculated): 0.3      Interpretation:  Total distance walked in six minutes is moderately reduced indicating a reduction in overall  functional capacity. There was significant oxygen desaturation to 87% Clinical correlation suggested.    Jamie Romero MD    "

## 2017-10-24 NOTE — PATIENT INSTRUCTIONS
.   [facility]  More Info    [address] [address]  [phone]       [facility]     [address] [place] [address]  [phone]           Formoterol; Mometasone metered dose inhaler  What is this medicine?  FORMOTEROL; MOMETASONE (for HARIKA gold MET a sone) inhalation is a combination of two medicines that decrease inflammation and help to open up the airways in your lungs. It is used to treat asthma. Do NOT use in an acute asthma attack.  How should I use this medicine?  This medicine is inhaled through the mouth. Follow the directions on the prescription label. Rinse your mouth with water after use. Make sure not to swallow the water. Take your medicine at regular intervals. Do not take your medicine more often than directed. Do not stop taking except on your doctor's advice. Make sure that you are using your inhaler correctly. Ask your doctor or health care provider if you have any questions.  A special MedGuide will be given to you by the pharmacist with each prescription and refill. Be sure to read this information carefully each time.  Talk to your pediatrician regarding the use of this medicine in children. Special care may be needed.  What side effects may I notice from receiving this medicine?  Side effects that you should report to your doctor or health care professional as soon as possible:  · allergic reactions like skin rash or hives, swelling of the face, lips, or tongue  · breathing problems  · chest pain  · dizziness or lightheaded  · fever or chills  · high blood pressure  · irregular heartbeat  · vision problems  Side effects that usually do not require medical attention (Report these to your doctor or health care professional if they continue or are bothersome.):  · coughing, hoarseness, throat irritation  · different taste in mouth  · headache  · nervousness  · stomach upset  · stuffy nose  · tremor  What may interact with this  medicine?  Do not take this medicine with any of the following mediations:  · MAOIs like Carbex, Eldepryl, Marplan, Nardil, and Parnate  This medicine may also interact with the following medications:  · aminophylline or theophylline  · antiviral medicines for HIV or AIDS  · certain antibiotics like clarithromycin, linezolid, and telithromycin  · certain medicines for blood pressure, heart disease, or irregular heart beat  · certain medicines for colds  · certain medicines for depression or emotional conditions  · certain medicines for fungal infections like ketoconazole and itraconazole  · diuretics  · other medicines for breathing problems  What if I miss a dose?  If you miss a dose, use it as soon as you remember. If it is almost time for your next dose, use only that dose and continue with your regular schedule, spacing doses evenly. Do not use double or extra doses.  Where should I keep my medicine?  Keep out of the reach of children.  Store at room temperature between 59 and 86 degrees F (15 and 30 degrees C). Throw away the inhaler after the dose counter reaches 0 or after the expiration date, whichever comes first. Avoid exposure to heat, fire, and flame.  What should I tell my health care provider before I take this medicine?  They need to know if you have any of these conditions:  · adrenal tumor  · aneurysm  · bone problems  · diabetes  · glaucoma  · heart disease or irregular heartbeat  · high blood pressure  · immune system problems  · infection  · seizures  · thyroid problems  · worsening asthma  · an unusual or allergic reaction to formoterol, mometasone, other medicines, foods, dyes, or preservatives  · pregnant or trying to get pregnant  · breast-feeding  What should I watch for while using this medicine?  Visit your doctor for regular check ups. Tell your doctor or health care professional if your symptoms do not get better. If your symptoms get worse or if you need your short-acting inhalers more  often, call your doctor right away. Do not use this medicine more than every 12 hours.  If you have asthma, be aware that using this medicine may increase your risk of dying from asthma-related problems. Talk to your doctor about the risks and benefits of taking this medicine. NEVER use this medicine for an acute asthma attack.  This medicine may increase your risk of getting an infection. Tell your doctor or health care professional if you are around anyone with measles or chickenpox, or if you develop sores or blisters that do not heal properly.  NOTE:This sheet is a summary. It may not cover all possible information. If you have questions about this medicine, talk to your doctor, pharmacist, or health care provider. Copyright© 2017 [employer]        Umeclidinium inhalation powder  What is this medicine?  UMECLIDINIUM (ue MEK li DIN ee um) is a bronchodilator. It helps open up the airways of your lungs. It is for chronic obstructive pulmonary disease (COPD), including chronic bronchitis or emphysema. Do NOT use for asthma or an acute asthma attack. Do NOT use for a COPD attack.  How should I use this medicine?  This medicine is inhaled through the mouth. It is used once per day. Follow the directions on the prescription label. Do not use a spacer device with this inhaler. Take your medicine at regular intervals. Do not take your medicine more often than directed. Do not stop taking except on your doctor's advice. Make sure that you are using your inhaler correctly. Ask you doctor or health care provider if you have any questions.  Talk to your pediatrician regarding the use of this medicine in children. Special care may be needed.  What side effects may I notice from receiving this medicine?  Side effects that you should report to your doctor or health care professional as soon as possible:  · allergic reactions like skin rash or hives, swelling of the face, lips, or tongue  · breathing problems right after  inhaling your medicine  · changes in vision  · cough  · fast or irregular heartbeat  · feeling faint or lightheaded, falls  · fever or chills  · sore throat  · trouble passing urine or change in the amount of urine  Side effects that usually do not require medical attention (Report these to your doctor or health care professional if they continue or are bothersome.):  · headache  · muscle pain  · nausea  · stomach pain  What may interact with this medicine?  · atropine  · antihistamines for allergy, cough and cold  · certain medicines for bladder problems like oxybutynin, tolterodine  · certain medicines for stomach problems like dicyclomine, hyoscyamine  · certain medicines for travel sickness like scopolamine  · certain medicines for Parkinson's disease like benztropine, trihexyphenidyl  · ipratropium  · tiotropium  What if I miss a dose?  If you miss a dose, use it as soon as you can. If it is almost time for your next dose, use only that dose and continue with your regular schedule. Do not use double or extra doses.  Where should I keep my medicine?  Keep out of the reach of children.  Store at room temperature between 15 and 30 degrees C (59 and 86 degrees F). Store in a dry place away from direct heat or sunlight. Throw away 6 weeks after you remove the inhaler from the foil tray, or after the dose indicator reads 0, whichever comes first. Throw away any unopened packages after the expiration date.  What should I tell my health care provider before I take this medicine?  They need to know if you have any of these conditions:  · bladder problems or difficulty passing urine  · glaucoma  · prostate trouble  · an unusual or allergic reaction to umeclidinium, lactose, milk proteins, other medicines, foods, dyes, or preservatives  · pregnant or trying to get pregnant  · breast-feeding  What should I watch for while using this medicine?  Visit your doctor or health care professional for regular checkups. Tell your  doctor or health care professional if your symptoms do not get better.  If your symptoms get worse or if you need your short-acting inhalers more often, call your doctor right away. Do not use this medicine more than once every 24 hours.  NOTE:This sheet is a summary. It may not cover all possible information. If you have questions about this medicine, talk to your doctor, pharmacist, or health care provider. Copyright© 2017 [employer] Standard
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
## 2017-10-25 ENCOUNTER — TELEPHONE (OUTPATIENT)
Dept: CARDIOLOGY | Facility: CLINIC | Age: 67
End: 2017-10-25

## 2017-10-25 DIAGNOSIS — J44.9 COPD, SEVERE: ICD-10-CM

## 2017-10-25 NOTE — TELEPHONE ENCOUNTER
----- Message from Sriram Urban MD sent at 10/24/2017 10:04 PM CDT -----  Has mild blockage in legs   Heart function is normal.  The pressures in her lungs are higher she needs to see pulmonary   Her stress test is ok

## 2017-10-25 NOTE — TELEPHONE ENCOUNTER
----- Message from Thalia Aquino sent at 10/25/2017 12:32 PM CDT -----  Contact: self [phone]  States that he pharm did not receive rx for albuterol. Pt uses     [facility] [place] - [address]  [address]  [place] [address]  Phone: [phone] Fax: [phone]    Please call back at [phone]//thank you acc

## 2017-10-25 NOTE — TELEPHONE ENCOUNTER
Confirmed six month recall appointment      ----- Message from Sriram Urban MD sent at 10/24/2017 10:04 PM CDT -----  Has mild blockage in legs   Heart function is normal.  The pressures in her lungs are higher she needs to see pulmonary   Her stress test is ok

## 2017-10-26 ENCOUNTER — PATIENT MESSAGE (OUTPATIENT)
Dept: PULMONOLOGY | Facility: CLINIC | Age: 67
End: 2017-10-26

## 2017-10-26 LAB
POST FEF 25 75: 0.51 L/S (ref 1.43–2.58)
POST FET 100: 10.98 S
POST FEV1 FVC: 60 %
POST FEV1: 1.28 L (ref 1.94–2.49)
POST FIF 50: 3.48 L/S
POST FVC: 2.13 L (ref 2.57–3.22)
POST PEF: 3.23 L/S (ref 4.87–6.46)
PRE FEF 25 75: 0.35 L/S (ref 1.43–2.58)
PRE FET 100: 12.27 S
PRE FEV1 FVC: 54 %
PRE FEV1: 1.14 L (ref 1.94–2.49)
PRE FIF 50: 2.98 L/S
PRE FVC: 2.1 L (ref 2.57–3.22)
PRE PEF: 4.44 L/S (ref 4.87–6.46)
PREDICTED FEV1 FVC: 77 % (ref 72.1–81.89)
PREDICTED FEV1: 2.21 L (ref 1.94–2.49)
PREDICTED FVC: 2.9 L (ref 2.57–3.22)
PROVOCATION PROTOCOL: ABNORMAL

## 2017-10-26 RX ORDER — ALBUTEROL SULFATE 90 UG/1
2 AEROSOL, METERED RESPIRATORY (INHALATION) EVERY 6 HOURS PRN
Qty: 18 G | Refills: 11 | Status: SHIPPED | OUTPATIENT
Start: 2017-10-26 | End: 2018-02-21 | Stop reason: SDUPTHER

## 2017-10-26 NOTE — TELEPHONE ENCOUNTER
----- Message from Alistair Sheppard sent at 10/26/2017  9:56 AM CDT -----  Contact: [phone]  Would like to consult with nurse about inhaler. Has questions about medication. Please call bk at [phone]. thx lj

## 2017-10-30 ENCOUNTER — PATIENT MESSAGE (OUTPATIENT)
Dept: PULMONOLOGY | Facility: CLINIC | Age: 67
End: 2017-10-30

## 2017-10-31 ENCOUNTER — PATIENT MESSAGE (OUTPATIENT)
Dept: PULMONOLOGY | Facility: CLINIC | Age: 67
End: 2017-10-31

## 2017-10-31 ENCOUNTER — TELEPHONE (OUTPATIENT)
Dept: PULMONOLOGY | Facility: CLINIC | Age: 67
End: 2017-10-31

## 2017-10-31 NOTE — TELEPHONE ENCOUNTER
Returned patient call, patient stated  experiencing some chest tightness this am, patient had not taken any medication this am, patient instructed on S/e of Umeclidinium and albuterol inhaler, patient instructed to schedule f/u apt if chest tightness, SOB, sweating  occurs patient stated understanding

## 2017-11-01 ENCOUNTER — TELEPHONE (OUTPATIENT)
Dept: PULMONOLOGY | Facility: CLINIC | Age: 67
End: 2017-11-01

## 2017-11-01 NOTE — TELEPHONE ENCOUNTER
Returned patient call instructed paper work faxed to company on 10/31/17. Patient stated understanding

## 2017-11-02 ENCOUNTER — PATIENT MESSAGE (OUTPATIENT)
Dept: FAMILY MEDICINE | Facility: CLINIC | Age: 67
End: 2017-11-02

## 2017-11-07 ENCOUNTER — TELEPHONE (OUTPATIENT)
Dept: PULMONOLOGY | Facility: HOSPITAL | Age: 67
End: 2017-11-07

## 2017-11-13 ENCOUNTER — TELEPHONE (OUTPATIENT)
Dept: PULMONOLOGY | Facility: CLINIC | Age: 67
End: 2017-11-13

## 2017-11-13 NOTE — TELEPHONE ENCOUNTER
Spoke to patient she states that she need smaller tanks. Spoke with [employer] they state that patient insurance does not cover smaller tanks and they do not carry  battery operated ones. Patient information sent to [facility] to assist patient with  battery operated concentrator

## 2017-11-13 NOTE — TELEPHONE ENCOUNTER
----- Message from Tamara Capone sent at 11/13/2017  8:33 AM CST -----  Contact: Patient  Patient request a call back at [phone], Regards to her oxygen tank she stated that it's to big for her she has balance issues.    Thanks  td

## 2017-11-18 DIAGNOSIS — F43.23 SITUATIONAL MIXED ANXIETY AND DEPRESSIVE DISORDER: ICD-10-CM

## 2017-11-20 DIAGNOSIS — F43.23 SITUATIONAL MIXED ANXIETY AND DEPRESSIVE DISORDER: ICD-10-CM

## 2017-11-20 RX ORDER — LORAZEPAM 0.5 MG/1
0.5 TABLET ORAL
Qty: 30 TABLET | OUTPATIENT
Start: 2017-11-20

## 2017-11-22 ENCOUNTER — PATIENT MESSAGE (OUTPATIENT)
Dept: FAMILY MEDICINE | Facility: CLINIC | Age: 67
End: 2017-11-22

## 2017-11-22 RX ORDER — LORAZEPAM 0.5 MG/1
0.5 TABLET ORAL
Qty: 30 TABLET | Refills: 0 | Status: SHIPPED | OUTPATIENT
Start: 2017-11-22 | End: 2017-12-22 | Stop reason: SDUPTHER

## 2017-11-22 NOTE — TELEPHONE ENCOUNTER
----- Message from Jessica Carney sent at 11/22/2017  9:10 AM CST -----  Patient requested refill on  lorazepam (ATIVAN) 0.5 MG tablet, she states [facility] has tried to contact office several times with no response patient will be out of medication on Sunday, call into [facility] pharmacy at  [phone]. Please call patient at  [phone] if you have any questions. Thank you.         [facility] [address] - [place] - [address]  [address]  [place] [address]  Phone: [phone] Fax: [phone]

## 2017-11-27 ENCOUNTER — PATIENT MESSAGE (OUTPATIENT)
Dept: PULMONOLOGY | Facility: CLINIC | Age: 67
End: 2017-11-27

## 2017-12-01 ENCOUNTER — TELEPHONE (OUTPATIENT)
Dept: PULMONOLOGY | Facility: CLINIC | Age: 67
End: 2017-12-01

## 2017-12-01 NOTE — TELEPHONE ENCOUNTER
----- Message from Waldo Puckett sent at 12/1/2017  2:54 PM CST -----  Contact: pt  She's calling in regards to the new company concerning oxygen co, please send orders, their ph# is [phone], she she states this is the 3rd week and has not heard from anyone concerning this, please call pt when orders have been sent, [phone] (home)

## 2017-12-05 DIAGNOSIS — M25.511 CHRONIC PAIN OF BOTH SHOULDERS: Primary | ICD-10-CM

## 2017-12-05 DIAGNOSIS — M25.512 CHRONIC PAIN OF BOTH SHOULDERS: Primary | ICD-10-CM

## 2017-12-05 DIAGNOSIS — G89.29 CHRONIC PAIN OF BOTH SHOULDERS: Primary | ICD-10-CM

## 2017-12-06 ENCOUNTER — OFFICE VISIT (OUTPATIENT)
Dept: ORTHOPEDICS | Facility: CLINIC | Age: 67
End: 2017-12-06
Payer: MEDICARE

## 2017-12-06 ENCOUNTER — HOSPITAL ENCOUNTER (OUTPATIENT)
Dept: RADIOLOGY | Facility: HOSPITAL | Age: 67
Discharge: HOME | End: 2017-12-06
Attending: ORTHOPAEDIC SURGERY
Payer: MEDICARE

## 2017-12-06 VITALS — WEIGHT: 163 LBS | HEIGHT: 62 IN | BODY MASS INDEX: 30 KG/M2

## 2017-12-06 DIAGNOSIS — M25.511 CHRONIC PAIN OF BOTH SHOULDERS: ICD-10-CM

## 2017-12-06 DIAGNOSIS — M25.512 CHRONIC PAIN OF BOTH SHOULDERS: ICD-10-CM

## 2017-12-06 DIAGNOSIS — G89.29 CHRONIC PAIN OF BOTH SHOULDERS: ICD-10-CM

## 2017-12-06 DIAGNOSIS — M19.012 PRIMARY OSTEOARTHRITIS OF BOTH SHOULDERS: ICD-10-CM

## 2017-12-06 DIAGNOSIS — M19.011 PRIMARY OSTEOARTHRITIS OF BOTH SHOULDERS: ICD-10-CM

## 2017-12-06 DIAGNOSIS — M75.52 BURSITIS OF LEFT SHOULDER: Primary | ICD-10-CM

## 2017-12-06 DIAGNOSIS — M75.42 IMPINGEMENT SYNDROME, SHOULDER, LEFT: ICD-10-CM
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
## 2017-12-06 PROCEDURE — 73030 X-RAY EXAM OF SHOULDER: CPT | Mod: TC,50,PO

## 2017-12-06 PROCEDURE — 99212 OFFICE O/P EST SF 10 MIN: CPT | Mod: PBBFAC,25,PO | Performed by: ORTHOPAEDIC SURGERY

## 2017-12-06 PROCEDURE — 99203 OFFICE O/P NEW LOW 30 MIN: CPT | Mod: S$PBB,,, | Performed by: ORTHOPAEDIC SURGERY

## 2017-12-06 PROCEDURE — 99999 PR PBB SHADOW E&M-EST. PATIENT-LVL II: CPT | Mod: PBBFAC,,, | Performed by: ORTHOPAEDIC SURGERY

## 2017-12-06 PROCEDURE — 73030 X-RAY EXAM OF SHOULDER: CPT | Mod: 26,50,, | Performed by: RADIOLOGY
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
## 2017-12-10 ENCOUNTER — PATIENT MESSAGE (OUTPATIENT)
Dept: PULMONOLOGY | Facility: CLINIC | Age: 67
End: 2017-12-10

## 2017-12-11 RX ORDER — METFORMIN HYDROCHLORIDE 500 MG/1
500 TABLET, EXTENDED RELEASE ORAL 2 TIMES DAILY WITH MEALS
Qty: 60 TABLET | Refills: 5 | OUTPATIENT
Start: 2017-12-11

## 2017-12-13 ENCOUNTER — OFFICE VISIT (OUTPATIENT)
Dept: FAMILY MEDICINE | Facility: CLINIC | Age: 67
End: 2017-12-13
Payer: MEDICARE

## 2017-12-13 VITALS
RESPIRATION RATE: 18 BRPM | BODY MASS INDEX: 30.55 KG/M2 | HEIGHT: 62 IN | WEIGHT: 166 LBS | SYSTOLIC BLOOD PRESSURE: 122 MMHG | HEART RATE: 84 BPM | DIASTOLIC BLOOD PRESSURE: 68 MMHG

## 2017-12-13 DIAGNOSIS — R42 DIZZINESS: ICD-10-CM

## 2017-12-13 DIAGNOSIS — R29.898 WEAKNESS OF BOTH LOWER EXTREMITIES: ICD-10-CM

## 2017-12-13 DIAGNOSIS — H81.10 BENIGN PAROXYSMAL VERTIGO, UNSPECIFIED LATERALITY: ICD-10-CM

## 2017-12-13 DIAGNOSIS — E78.5 HYPERLIPIDEMIA LDL GOAL <70: ICD-10-CM

## 2017-12-13 DIAGNOSIS — E11.42 TYPE 2 DIABETES MELLITUS WITH DIABETIC POLYNEUROPATHY, WITHOUT LONG-TERM CURRENT USE OF INSULIN: Primary | ICD-10-CM

## 2017-12-13 DIAGNOSIS — I10 ESSENTIAL HYPERTENSION: Chronic | ICD-10-CM

## 2017-12-13 PROCEDURE — 99214 OFFICE O/P EST MOD 30 MIN: CPT | Mod: S$PBB,,, | Performed by: FAMILY MEDICINE

## 2017-12-13 PROCEDURE — 99214 OFFICE O/P EST MOD 30 MIN: CPT | Mod: PBBFAC,PO | Performed by: FAMILY MEDICINE

## 2017-12-13 PROCEDURE — 99999 PR PBB SHADOW E&M-EST. PATIENT-LVL IV: CPT | Mod: PBBFAC,,, | Performed by: FAMILY MEDICINE

## 2017-12-13 NOTE — PROGRESS NOTES
Subjective:       Patient ID: Tiana Bosch is a 67 y.o. female.    Chief Complaint: Hypertension (Patient here for 4 month follow up) and Fall (Patient reports 4 falls in the last 4 months. Reports that she gets dizzy when changing position and falls. )    Here for f/u chronic medical issues.  Doing well overall.  Dizzy with certain movements and fell 3 times since last visit.      Hypertension   Pertinent negatives include no chest pain, palpitations or shortness of breath.   Fall   Pertinent negatives include no abdominal pain or fever.     Review of Systems   Constitutional: Negative for chills, fatigue and fever.   Respiratory: Negative for cough, chest tightness and shortness of breath.    Cardiovascular: Negative for chest pain, palpitations and leg swelling.   Gastrointestinal: Negative for abdominal distention and abdominal pain.   Endocrine: Negative for cold intolerance and heat intolerance.   Skin: Negative for rash.   Neurological: Positive for dizziness.   Psychiatric/Behavioral: Negative for dysphoric mood. The patient is not nervous/anxious.        Objective:      Physical Exam   Constitutional: She appears well-developed and well-nourished.   HENT:   Head: Normocephalic and atraumatic.   Cardiovascular: Normal rate, regular rhythm and normal heart sounds.    Pulmonary/Chest: Effort normal and breath sounds normal.   Abdominal: Soft. Bowel sounds are normal.   Psychiatric: She has a normal mood and affect.   Nursing note and vitals reviewed.      Assessment:       1. Type 2 diabetes mellitus with diabetic polyneuropathy, without long-term current use of insulin    2. Hyperlipidemia LDL goal <70    3. Essential hypertension    4. Weakness of both lower extremities    5. Dizziness    6. Benign paroxysmal vertigo, unspecified laterality         Plan:       Type 2 diabetes mellitus with diabetic polyneuropathy, without long-term current use of insulin  -     CBC auto differential; Future; Expected  date: 12/13/2017  -     Comprehensive metabolic panel; Future; Expected date: 12/13/2017  -     Hemoglobin A1c; Future; Expected date: 12/13/2017  -     Lipid panel; Future; Expected date: 12/13/2017  -     Microalbumin/creatinine urine ratio; Future; Expected date: 12/13/2017  -     TSH; Future; Expected date: 12/13/2017    Hyperlipidemia LDL goal <70  -     CBC auto differential; Future; Expected date: 12/13/2017  -     Comprehensive metabolic panel; Future; Expected date: 12/13/2017  -     Hemoglobin A1c; Future; Expected date: 12/13/2017  -     Lipid panel; Future; Expected date: 12/13/2017  -     Microalbumin/creatinine urine ratio; Future; Expected date: 12/13/2017  -     TSH; Future; Expected date: 12/13/2017    Essential hypertension  -     CBC auto differential; Future; Expected date: 12/13/2017  -     Comprehensive metabolic panel; Future; Expected date: 12/13/2017  -     Hemoglobin A1c; Future; Expected date: 12/13/2017  -     Lipid panel; Future; Expected date: 12/13/2017  -     Microalbumin/creatinine urine ratio; Future; Expected date: 12/13/2017  -     TSH; Future; Expected date: 12/13/2017    Weakness of both lower extremities  -     Ambulatory Referral to Physical/Occupational Therapy    Dizziness  -     Ambulatory Referral to Audiology  -     Ambulatory referral to ENT    Benign paroxysmal vertigo, unspecified laterality   -     Ambulatory Referral to Audiology      To ent/audiology for bppv eval.  Will monitor chronic medical issues and continue current plan of care.      Return in about 3 months (around 3/13/2018), or if symptoms worsen or fail to improve.

## 2017-12-18 ENCOUNTER — OFFICE VISIT (OUTPATIENT)
Dept: OTOLARYNGOLOGY | Facility: CLINIC | Age: 67
End: 2017-12-18
Payer: MEDICARE

## 2017-12-18 ENCOUNTER — CLINICAL SUPPORT (OUTPATIENT)
Dept: AUDIOLOGY | Facility: CLINIC | Age: 67
End: 2017-12-18
Payer: MEDICARE

## 2017-12-18 VITALS
SYSTOLIC BLOOD PRESSURE: 123 MMHG | HEART RATE: 90 BPM | HEIGHT: 62 IN | WEIGHT: 166.69 LBS | DIASTOLIC BLOOD PRESSURE: 68 MMHG | BODY MASS INDEX: 30.67 KG/M2

## 2017-12-18 DIAGNOSIS — H90.3 BILATERAL SENSORINEURAL HEARING LOSS: ICD-10-CM

## 2017-12-18 DIAGNOSIS — R29.6 FREQUENT FALLS: ICD-10-CM

## 2017-12-18 DIAGNOSIS — R42 DIZZINESS: Primary | ICD-10-CM

## 2017-12-18 DIAGNOSIS — H90.3 SENSORINEURAL HEARING LOSS (SNHL) OF BOTH EARS: ICD-10-CM

## 2017-12-18 DIAGNOSIS — G62.9 POLYNEUROPATHY: ICD-10-CM

## 2017-12-18 DIAGNOSIS — H93.13 TINNITUS, BILATERAL: ICD-10-CM

## 2017-12-18 DIAGNOSIS — R29.898 WEAKNESS OF BOTH LEGS: ICD-10-CM

## 2017-12-18 DIAGNOSIS — H93.13 TINNITUS OF BOTH EARS: ICD-10-CM

## 2017-12-18 PROCEDURE — 99214 OFFICE O/P EST MOD 30 MIN: CPT | Mod: PBBFAC,27,PO,25 | Performed by: NURSE PRACTITIONER

## 2017-12-18 PROCEDURE — 92567 TYMPANOMETRY: CPT | Mod: PBBFAC,PO | Performed by: AUDIOLOGIST

## 2017-12-18 PROCEDURE — 92557 COMPREHENSIVE HEARING TEST: CPT | Mod: PBBFAC,PO | Performed by: AUDIOLOGIST

## 2017-12-18 PROCEDURE — 99999 PR PBB SHADOW E&M-EST. PATIENT-LVL I: CPT | Mod: PBBFAC,,,

## 2017-12-18 PROCEDURE — 99211 OFF/OP EST MAY X REQ PHY/QHP: CPT | Mod: PBBFAC,PO,25

## 2017-12-18 PROCEDURE — 99214 OFFICE O/P EST MOD 30 MIN: CPT | Mod: S$PBB,,, | Performed by: NURSE PRACTITIONER

## 2017-12-18 PROCEDURE — 99999 PR PBB SHADOW E&M-EST. PATIENT-LVL IV: CPT | Mod: PBBFAC,,, | Performed by: NURSE PRACTITIONER

## 2017-12-18 NOTE — LETTER
December 18, 2017      MARY JO Malone MD  [address] [facility] [address] [place] [address]           [facility]  [address] [facility] [address] [place] [address]  Phone: [phone]  Fax: [phone]          Patient: Tiana Bosch   MR Number: [id number]   YOB: 1950   Date of Visit: 12/18/2017       Dear Dr. MARY JO Malone:    Thank you for referring Tiana Bosch to me for evaluation. Attached you will find relevant portions of my assessment and plan of care.    If you have questions, please do not hesitate to call me. I look forward to following Tiana Bosch along with you.    Sincerely,    Andria Zhang NP    Enclosure  CC:  No Recipients    If you would like to receive this communication electronically, please contact [email or web address] or [phone] to request more information on [facility] access.    For providers and/or their staff who would like to refer a patient to [facility], please contact us through our one-stop-shop provider referral line, [facility], at [phone].    If you feel you have received this communication in error or would no longer like to receive these types of communications, please e-mail [email or web address]

## 2017-12-18 NOTE — PROGRESS NOTES
Subjective:       Patient ID: Tiana Bosch is a 67 y.o. female.    Chief Complaint: No chief complaint on file.    HPI   Patient was evaluated by Dr. Kavon Carrasco in Feb and Mar 2016 for sinusitis. She is referred back to ENT at this time by Dr. Malone whom she saw last week. She reported to Dr. Malone dizziness and fallin falls in the last 4 months. Reports that she gets dizzy when changing position and falls. Patient reports dizziness has been on and off X 2 year, but worse in past 6 months. She reports vertigo upon laying back in bed, getting out of bed, or turning quickly. Spinning subsides within 30 seconds. No nausea. She had normal VNG by Aj Buckley in [place] at [facility] in 2014 and 2016 which showed normal vestibular function, no caloric weakness. She underwent canalith repositioning a few times by Aj, but it was not resolving so she was instructed to do Michelle-Daroff exercises at home. Dr. Malone ordered formal balance retraining via Physical Therapy to begin this week due to frequent falls.  Patient has multiple health concerns including fibromyalgia, dyspnea, COPD, coronary artery disease, neuropathy secondary to DM, macular degeneration.       Review of Systems   Constitutional: Negative.    HENT: Negative.    Eyes: Negative.    Respiratory: Negative.    Cardiovascular: Negative.    Gastrointestinal: Negative.    Musculoskeletal: Negative.    Skin: Negative.    Neurological: Positive for dizziness.   Hematological: Negative.    Psychiatric/Behavioral: Negative.        Objective:      Physical Exam   Constitutional: She is oriented to person, place, and time. Vital signs are normal. She appears well-developed and well-nourished. She is cooperative. She does not appear ill. No distress.   obese   HENT:   Head: Normocephalic and atraumatic.   Right Ear: Hearing, tympanic membrane, external ear and ear canal normal. Tympanic membrane is not erythematous. No middle ear  effusion.   Left Ear: Hearing, tympanic membrane, external ear and ear canal normal. Tympanic membrane is not erythematous.  No middle ear effusion.   Nose: Nose normal. No mucosal edema or rhinorrhea. Right sinus exhibits no maxillary sinus tenderness and no frontal sinus tenderness. Left sinus exhibits no maxillary sinus tenderness and no frontal sinus tenderness.   Mouth/Throat: Uvula is midline, oropharynx is clear and moist and mucous membranes are normal. Mucous membranes are not pale, not dry and not cyanotic. She has dentures. No oral lesions. No oropharyngeal exudate, posterior oropharyngeal edema or posterior oropharyngeal erythema.   Eyes: Conjunctivae, EOM and lids are normal. Pupils are equal, round, and reactive to light. Right eye exhibits no discharge. Left eye exhibits no discharge. No scleral icterus.   Neck: Trachea normal and normal range of motion. Neck supple. No tracheal deviation present. No thyroid mass and no thyromegaly present.   Cardiovascular: Normal rate.    Pulmonary/Chest: Effort normal. No stridor. No respiratory distress. She has no wheezes.   Musculoskeletal: Normal range of motion.   Lymphadenopathy:        Head (right side): No submental, no submandibular, no tonsillar, no preauricular and no posterior auricular adenopathy present.        Head (left side): No submental, no submandibular, no tonsillar, no preauricular and no posterior auricular adenopathy present.     She has no cervical adenopathy.        Right cervical: No superficial cervical and no posterior cervical adenopathy present.       Left cervical: No superficial cervical and no posterior cervical adenopathy present.   Neurological: She is alert and oriented to person, place, and time. She has normal strength. Coordination and gait normal.   Skin: Skin is warm, dry and intact. No lesion and no rash noted. She is not diaphoretic. No cyanosis. No pallor.   Psychiatric: She has a normal mood and affect. Her speech is  normal and behavior is normal. Judgment and thought content normal. Cognition and memory are normal.   Nursing note and vitals reviewed.    Negative You-Hallpike AU    Assessment:     Disequilibrium, likely multifactorial  No BPPV, normal VNG 2 years ago    Mild to moderate SNHL AU  Tinnitus AU  Plan:     No evidence of BPPV. Normal VNG in the past. If VNG showed a unilateral caloric weakness, treatment would be Formal Vestibular Rehab, for which she is already scheduled; therefore recommend she proceed with Balance Retraining Therapy as ordered by PCP.   PATIENT IS MEDICALLY CLEARED FOR HEARING AIDS.   Today's audiogram reveals the patient is a candidate for amplification. Audiogram is reviewed in detail with the patient. The audiologist's recommendation that the patient have amplification/hearing aids is discussed and questions answered. Patient has been given information by the audiologist on how to schedule a hearing aid consultation. Patient is encouraged to wear ear protection in loud noise and return annually for hearing test. Return to clinic as needed for further ENT concerns.

## 2017-12-19 RX ORDER — METFORMIN HYDROCHLORIDE 500 MG/1
500 TABLET, EXTENDED RELEASE ORAL 2 TIMES DAILY WITH MEALS
Qty: 60 TABLET | Refills: 5 | OUTPATIENT
Start: 2017-12-19

## 2017-12-20 RX ORDER — METFORMIN HYDROCHLORIDE 500 MG/1
500 TABLET, EXTENDED RELEASE ORAL 2 TIMES DAILY WITH MEALS
Qty: 180 TABLET | Refills: 1 | Status: SHIPPED | OUTPATIENT
Start: 2017-12-20 | End: 2018-06-01 | Stop reason: SDUPTHER

## 2017-12-20 NOTE — TELEPHONE ENCOUNTER
Refill Authorization Note     is requesting a refill authorization.    Brief assessment and rationale for refill: APPROVE: prr  Amount/Quantity of medication ordered: 90d         Refills Authorized: Yes  If authorized number of refills: 1           Medication Therapy Plan: A1c uncontrolled; next a1c already scheduled for april so will anant enough fills until then.   Name and strength of medication: METFORMIN  MG TABLET  How patient will take medication: t1t po BID   Medication reconciliation completed: No  Comments:   Lab Results   Component Value Date    HGBA1C 7.7 (H) 08/09/2017      Lab Results   Component Value Date    CREATININE 1.0 08/09/2017    BUN 22 08/09/2017     08/09/2017    K 5.0 08/09/2017     08/09/2017    CO2 23 08/09/2017

## 2017-12-22 ENCOUNTER — CLINICAL SUPPORT (OUTPATIENT)
Dept: REHABILITATION | Facility: HOSPITAL | Age: 67
End: 2017-12-22
Attending: FAMILY MEDICINE
Payer: MEDICARE

## 2017-12-22 DIAGNOSIS — R29.898 WEAKNESS OF BOTH LEGS: ICD-10-CM

## 2017-12-22 DIAGNOSIS — F43.23 SITUATIONAL MIXED ANXIETY AND DEPRESSIVE DISORDER: ICD-10-CM

## 2017-12-22 PROCEDURE — G8978 MOBILITY CURRENT STATUS: HCPCS | Mod: CL,PN

## 2017-12-22 PROCEDURE — 97161 PT EVAL LOW COMPLEX 20 MIN: CPT | Mod: PN

## 2017-12-22 PROCEDURE — G8979 MOBILITY GOAL STATUS: HCPCS | Mod: CJ,PN

## 2017-12-22 RX ORDER — LORAZEPAM 0.5 MG/1
TABLET ORAL
Qty: 30 TABLET | Refills: 0 | Status: SHIPPED | OUTPATIENT
Start: 2017-12-22 | End: 2018-01-19 | Stop reason: SDUPTHER
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
## 2017-12-26 ENCOUNTER — CLINICAL SUPPORT (OUTPATIENT)
Dept: REHABILITATION | Facility: HOSPITAL | Age: 67
End: 2017-12-26
Attending: FAMILY MEDICINE
Payer: MEDICARE

## 2017-12-26 DIAGNOSIS — R29.898 WEAKNESS OF BOTH LEGS: ICD-10-CM

## 2017-12-26 PROCEDURE — 97110 THERAPEUTIC EXERCISES: CPT | Mod: PN

## 2017-12-26 NOTE — PROGRESS NOTES
Name: Tiana Bosch  [facility] Number: [id number]  Date of Treatment: 12/26/2017   Diagnosis:   Encounter Diagnosis   Name Primary?    Weakness of both legs      Visit number: 2  Start date: 12/22/14  POC End date: 3/16/17    Time in: 8:47  Time Out: 9:44  Total Treatment Time: 45    Precautions: Falls    Subjective:    Tiana reports a fall from sitting Saturday morning in which she was sitting down on a bench at the end of her bed and reached forward to change her shoes and fell off the bench. Patient reports their pain to be 9/10 on a 0-10 scale with 0 being no pain and 10 being the worst pain imaginable.    Objective    Tiana received therapeutic exercises to develop strength, endurance, posture and core stabilization for 25 minutes including:   Bridge 2 x 5  Iso hip add w/ TA and pelvic floor 2 x 5  Supine HS stretch by PT 1 x 30 seconds each with gentle soft tissue mobs to quad during stretch  Heel slides x 5 B   Supine hip abd with RTB 2 x 5  Sit to stand x 5    Patient participated in neuromuscular re-education activities to improve: Balance, Proprioception and Posture for 15 minutes. The following activities were included:   Single leg stance on floor < 2  Double leg balance on floor EC    Narrow base of support on floor with EC 30 second hold with lateral sway  Tandem on floor - 10 second max with R LE forward and <2 with L LE forward    Pt received manual therapy techniques including long axis LE distraction x 2 minutes total.     Written Home Exercises Provided: hook lying hip abd RTB, hook lying hip add, bridge, single leg stance with hand assist  Pt demo good understanding of the education provided. Tiana demonstrated good return demonstration of activities.     Assessment:   Pt presents with decreased muscular control with supine hip strengthening exercises demonstrated through shaking towards end of exercises. She presents with decreased balance demonstrated through A/P sway with double leg and tandem  exercises and with narrow base of support exercises. Pt demonstrates difficulty with performing bridge in full extension and fatigues toward end of reps with decreased excursion.     Pt will continue to benefit from skilled PT intervention. Medical Necessity is demonstrated by:  Fall Risk, Pain limits function of effected part for some activities, requires skilled supervision to complete and progress HEP, Weakness and decreased balance.    Patient is making good progress towards established goals.    New/Revised goals: none this visit.     Short Term Goals:  4 weeks  1. Pt will improve TUG score to < 20 seconds for decreased chance of fall.   2. Pt will report ability to ambulate 10 minutes without difficulty.   3. Pt will demonstrate increased LE strength by one half grade for increased safety with ambulation.   4. Pt will demonstrate increased Parra balance scale score by 4 points for improved safety with ambulation.      Long Term Goals: 8-12 weeks  1. Pt will improve TUG score to < 12 seconds for decreased change of fall.   2. Pt will report ability to ambulate 20 minutes without difficulty.   3. Pt will demonstrate increased LE strength by one full grade for increased safety with ambulation.   4. Pt will demonstrate increased Parra balance scale score by 8 points for improved safety with ambulation.   5. Pt will be independent with HEP and self management of symptoms.     Plan:  Continue with established Plan of Care towards PT goals.

## 2017-12-26 NOTE — PATIENT INSTRUCTIONS
Adduction: Hip - Knees Together (Hook-Lying)        Lie with hips and knees bent, towel roll between knees. Push knees together while pressing belly button to spine and tightening pelvic floor muscles. Hold for _3__ seconds. Rest for _5__ seconds. Repeat __5_ times. Do __2_ sets every other day.      Copyright © I. All rights reserved.   Low Bridge        Lie on back, legs bent. Press hips up and rest on shoulder blades. Hold _3__ seconds, at highest position. Return to starting position.   Repeat __5_ times per session. Do __2_ sets every other day.    Copyright © [employer]. All rights reserved.   Bridge Pose        With knees bend and band just above knees, push out into band while pressing belly button down and tightening pelvic floor. Repeat 5 times. Do 2 sets every other day.     Copyright © [employer]. All rights reserved.   Single Leg - Eyes Open        Holding support, lift right leg while maintaining balance over other leg.  Progress to removing hands from support surface for longer periods of time. Hold__5__ seconds.  Repeat __5__ times per session. Do __1__ sessions per day.    Copyright © [employer]. All rights reserved.

## 2017-12-29 ENCOUNTER — CLINICAL SUPPORT (OUTPATIENT)
Dept: REHABILITATION | Facility: HOSPITAL | Age: 67
End: 2017-12-29
Attending: FAMILY MEDICINE
Payer: MEDICARE

## 2017-12-29 DIAGNOSIS — R29.898 WEAKNESS OF BOTH LEGS: ICD-10-CM

## 2017-12-29 PROCEDURE — 97110 THERAPEUTIC EXERCISES: CPT | Mod: PN

## 2017-12-29 PROCEDURE — 97112 NEUROMUSCULAR REEDUCATION: CPT | Mod: PN

## 2017-12-29 NOTE — PROGRESS NOTES
[id number]  Date of Treatment: 12/29/2017   Diagnosis:   Encounter Diagnosis   Name Primary?    Weakness of both legs      Visit number: 3  Start date: 12/22/14  POC End date: 3/16/17    Time in: 7:05  Time Out: 7:50  Total Treatment Time: 40    Precautions: Falls    Subjective:    Tiana reports no falls since last visit. Felt ok after last visit. The weather is making her hurt more and she feels it in her legs. She reports continued shaking in legs especially with stooping to empty cat litter. She has moved cat litter up to a table to decrease possibliity of falling with stooping. Patient reports their pain to be 9/10 on a 0-10 scale with 0 being no pain and 10 being the worst pain imaginable. Pt reports decreased pain to 8/10 at end of treatment. Pt's  present to treatment in gym today.     Objective    Patient participated in neuromuscular re-education activities to improve: Balance, Proprioception and Posture for 15 minutes. The following activities were included:   Single leg stance on floor < 2 second hold without use of UE to recover  Double leg balance on floor EC max hold 5 seconds    Narrow base of support on floor with EC 30 second hold with lateral sway max   Tandem on floor - 12 second max with R LE forward and <2 with L LE forward  Gait training with head turns up/stephanie side/side    Tiana received therapeutic exercises to develop strength, endurance, posture and core stabilization for 25 minutes including:   Bridge 1 x 5  Iso hip add w/ TA and pelvic floor 1 x 5  Supine hip abd with RTB 1 x 5  Sit to stand x 6  Side stepping in parallel bars 2 laps  Seated HS stretch in chair 3 x 30 seconds    Written Home Exercises Provided: continue with current HEP.  Pt demo good understanding of the education provided. Tiana demonstrated good return demonstration of activities.     Assessment:   Pt performed all exercises without reports of increased pain to LE, reports  she's tired. She presents with improving balance with noted increase in duration while performing exercises.    Pt will continue to benefit from skilled PT intervention. Medical Necessity is demonstrated by:  Fall Risk, Pain limits function of effected part for some activities, requires skilled supervision to complete and progress HEP, Weakness and decreased balance.    Patient is making good progress towards established goals.    New/Revised goals: none this visit.     Short Term Goals:  4 weeks  1. Pt will improve TUG score to < 20 seconds for decreased chance of fall.   2. Pt will report ability to ambulate 10 minutes without difficulty.   3. Pt will demonstrate increased LE strength by one half grade for increased safety with ambulation.   4. Pt will demonstrate increased Parra balance scale score by 4 points for improved safety with ambulation.      Long Term Goals: 8-12 weeks  1. Pt will improve TUG score to < 12 seconds for decreased change of fall.   2. Pt will report ability to ambulate 20 minutes without difficulty.   3. Pt will demonstrate increased LE strength by one full grade for increased safety with ambulation.   4. Pt will demonstrate increased Parra balance scale score by 8 points for improved safety with ambulation.   5. Pt will be independent with HEP and self management of symptoms.     Plan:  Continue with established Plan of Care towards PT goals.

## 2018-01-01 ENCOUNTER — OFFICE VISIT (OUTPATIENT)
Dept: FAMILY MEDICINE | Facility: CLINIC | Age: 68
End: 2018-01-01
Payer: MEDICARE

## 2018-01-01 ENCOUNTER — LAB VISIT (OUTPATIENT)
Dept: LAB | Facility: HOSPITAL | Age: 68
End: 2018-01-01
Attending: FAMILY MEDICINE
Payer: MEDICARE

## 2018-01-01 ENCOUNTER — PATIENT MESSAGE (OUTPATIENT)
Dept: FAMILY MEDICINE | Facility: CLINIC | Age: 68
End: 2018-01-01

## 2018-01-01 ENCOUNTER — OUTPATIENT CASE MANAGEMENT (OUTPATIENT)
Dept: ADMINISTRATIVE | Facility: OTHER | Age: 68
End: 2018-01-01

## 2018-01-01 VITALS
HEIGHT: 62 IN | WEIGHT: 165.81 LBS | OXYGEN SATURATION: 96 % | DIASTOLIC BLOOD PRESSURE: 68 MMHG | RESPIRATION RATE: 18 BRPM | SYSTOLIC BLOOD PRESSURE: 126 MMHG | TEMPERATURE: 99 F | HEART RATE: 83 BPM | BODY MASS INDEX: 30.51 KG/M2

## 2018-01-01 DIAGNOSIS — F41.9 ANXIETY: ICD-10-CM

## 2018-01-01 DIAGNOSIS — Z12.11 COLON CANCER SCREENING: ICD-10-CM

## 2018-01-01 DIAGNOSIS — Z99.81 ON HOME OXYGEN THERAPY: ICD-10-CM

## 2018-01-01 DIAGNOSIS — J44.1 COPD WITH ACUTE EXACERBATION: ICD-10-CM

## 2018-01-01 DIAGNOSIS — K21.9 GERD WITHOUT ESOPHAGITIS: ICD-10-CM

## 2018-01-01 DIAGNOSIS — F33.1 MODERATE EPISODE OF RECURRENT MAJOR DEPRESSIVE DISORDER: Primary | ICD-10-CM

## 2018-01-01 LAB — HEMOCCULT STL QL IA: NEGATIVE

## 2018-01-01 PROCEDURE — 99214 OFFICE O/P EST MOD 30 MIN: CPT | Mod: S$PBB,,, | Performed by: NURSE PRACTITIONER

## 2018-01-01 PROCEDURE — 99999 PR PBB SHADOW E&M-EST. PATIENT-LVL V: CPT | Mod: PBBFAC,,, | Performed by: NURSE PRACTITIONER

## 2018-01-01 PROCEDURE — 99215 OFFICE O/P EST HI 40 MIN: CPT | Mod: PBBFAC,PO | Performed by: NURSE PRACTITIONER

## 2018-01-01 PROCEDURE — 82274 ASSAY TEST FOR BLOOD FECAL: CPT

## 2018-01-01 RX ORDER — PANTOPRAZOLE SODIUM 40 MG/1
40 TABLET, DELAYED RELEASE ORAL DAILY
Qty: 90 TABLET | Refills: 0 | Status: SHIPPED | OUTPATIENT
Start: 2018-01-01 | End: 2019-01-01 | Stop reason: SDUPTHER

## 2018-01-01 RX ORDER — GLIPIZIDE 5 MG/1
5 TABLET ORAL
Qty: 180 TABLET | Refills: 1 | Status: SHIPPED | OUTPATIENT
Start: 2018-01-01 | End: 2019-01-01 | Stop reason: SDUPTHER

## 2018-01-01 RX ORDER — SUCRALFATE 1 G/10ML
1 SUSPENSION ORAL 4 TIMES DAILY
Qty: 1200 ML | Refills: 5 | Status: SHIPPED | OUTPATIENT
Start: 2018-01-01 | End: 2019-01-01 | Stop reason: ALTCHOICE

## 2018-01-01 RX ORDER — ESCITALOPRAM OXALATE 10 MG/1
10 TABLET ORAL DAILY
Qty: 30 TABLET | Refills: 3 | Status: SHIPPED | OUTPATIENT
Start: 2018-01-01 | End: 2019-01-01

## 2018-01-01 RX ORDER — METFORMIN HYDROCHLORIDE 500 MG/1
500 TABLET, EXTENDED RELEASE ORAL 2 TIMES DAILY WITH MEALS
Qty: 180 TABLET | Refills: 0 | Status: SHIPPED | OUTPATIENT
Start: 2018-01-01 | End: 2019-01-01 | Stop reason: SDUPTHER

## 2018-01-05 ENCOUNTER — CLINICAL SUPPORT (OUTPATIENT)
Dept: REHABILITATION | Facility: HOSPITAL | Age: 68
End: 2018-01-05
Attending: FAMILY MEDICINE
Payer: MEDICARE

## 2018-01-05 DIAGNOSIS — R29.898 WEAKNESS OF BOTH LEGS: ICD-10-CM

## 2018-01-05 PROCEDURE — 97112 NEUROMUSCULAR REEDUCATION: CPT | Mod: PN

## 2018-01-05 PROCEDURE — 97110 THERAPEUTIC EXERCISES: CPT | Mod: PN

## 2018-01-05 NOTE — PROGRESS NOTES
"Name: Tiana Bosch  [facility] Number: [id number]  Date of Treatment: 01/04/2018   Diagnosis:   Encounter Diagnosis   Name Primary?    Weakness of both legs      Visit number: 4  Start date: 12/22/14  POC End date: 3/16/17    Time in: 7:55 am  Time Out: 8:50 am  Total Treatment Time: 45    Precautions: Falls    Subjective:    Tiana reports no falls since last visit. She reports she had increased pain and burning after her last visit until that night with a pain level of 15-20/10 the rest of the day. "I wanted to saw off my legs." She reports pain level of 9/10 average over the next week. She did try to perform her exercises since last visit. Pt's  present to treatment in gym today. Pt's  was wondering when they should look into getting a motorized scooter for travelling with family and much longer distances or if we will be able to make that decision now or wait until later in therapy; I advised we should wait to see what she gains in therapy before taking that route.     Objective    Patient participated in neuromuscular re-education activities to improve: Balance, Proprioception and Posture for 20 minutes. The following activities were included:   Single leg stance on floor < 3 second hold without use of UE to recover  Tandem on floor - 12 second max with R LE forward and <10 with L LE forward  Double leg balance on floor EO 30 second hold  Double leg balance on floor EC max hold 20 seconds   Double leg balance on airex EO 20 second hold  Narrow base of support on floor with EC 30 second hold with lateral sway max     Gait training with gait belt with cues for heel strike and increased adela 30 feet x 5. Retro walking x 2 for 15 feet each rep (half of patterned walk way).     Tiana received therapeutic exercises to develop strength, endurance, posture and core stabilization for 25 minutes including:   Bridge 1 x 5  Iso hip add w/ TA and pelvic floor 1 x 5  Supine hip abd with RTB 1 x 5  Sit to stand " x 6 (NP)  Side stepping in parallel bars 2 laps (NP)  Seated HS stretch in chair 3 x 30 seconds  Standing gastroc stretch on incline board 3 x 30 seconds    Written Home Exercises Provided: continue with current HEP. Increased awareness of heel strike with ambulation.   Pt demo good understanding of the education provided. Tiana demonstrated good return demonstration of activities.     Assessment:   Pt demonstrates improved duration with most balance exercises. She was able to progress to double leg stance on airex with EO for 20 seconds. She demonstrates much A/P and lateral sway (R>L) with all balance exercises. When performing gait training, pt demonstrated decreased heel strike when not concentrating on placing heel down first with conversation. Discussed importance of meeting with pulmonologist to figure out improving her oxygen levels to allow for improved endurance and function of muscles.     Pt will continue to benefit from skilled PT intervention. Medical Necessity is demonstrated by:  Fall Risk, Pain limits function of effected part for some activities, requires skilled supervision to complete and progress HEP, Weakness and decreased balance.    Patient is making good progress towards established goals.    New/Revised goals: none this visit.     Short Term Goals:  4 weeks  1. Pt will improve TUG score to < 20 seconds for decreased chance of fall.   2. Pt will report ability to ambulate 10 minutes without difficulty.   3. Pt will demonstrate increased LE strength by one half grade for increased safety with ambulation.   4. Pt will demonstrate increased Parra balance scale score by 4 points for improved safety with ambulation.      Long Term Goals: 8-12 weeks  1. Pt will improve TUG score to < 12 seconds for decreased change of fall.   2. Pt will report ability to ambulate 20 minutes without difficulty.   3. Pt will demonstrate increased LE strength by one full grade for increased safety with ambulation.    4. Pt will demonstrate increased Parra balance scale score by 8 points for improved safety with ambulation.   5. Pt will be independent with HEP and self management of symptoms.     Plan:  Continue with established Plan of Care towards PT goals.

## 2018-01-09 ENCOUNTER — TELEPHONE (OUTPATIENT)
Dept: PULMONOLOGY | Facility: HOSPITAL | Age: 68
End: 2018-01-09

## 2018-01-09 ENCOUNTER — CLINICAL SUPPORT (OUTPATIENT)
Dept: REHABILITATION | Facility: HOSPITAL | Age: 68
End: 2018-01-09
Attending: FAMILY MEDICINE
Payer: MEDICARE

## 2018-01-09 DIAGNOSIS — R29.898 WEAKNESS OF BOTH LEGS: ICD-10-CM

## 2018-01-09 PROCEDURE — 97112 NEUROMUSCULAR REEDUCATION: CPT | Mod: PN

## 2018-01-09 PROCEDURE — 97110 THERAPEUTIC EXERCISES: CPT | Mod: PN

## 2018-01-09 NOTE — PROGRESS NOTES
Name: Tiana Bosch  Clinic Number: [id number]  Date of Treatment: 01/09/2018   Diagnosis:   Encounter Diagnosis   Name Primary?    Weakness of both legs      Visit number: 5  Start date: 12/22/14  POC End date: 3/16/17    Time in: 9:00 am  Time Out: 10:00 am  Total Treatment Time: 50    Precautions: Falls    Subjective:    Tiana reports having some spasms in the L LE after her previous session. States she is still dizzy at times that fluctuates, especially with quick head turns or standing too quickly. States she has vision changes from migranes. States she feels weak overall in the legs, with pain rated at 8/10. Pt's  present to treatment in gym today. States her fibromyalgia mostly makes her feel weak with muscle spasms.     Objective  Pulse oximeter O2 sat: 95% upon arrival, 93% post tx    Patient participated in neuromuscular re-education activities to improve: Balance, Proprioception and Posture for 25 minutes. The following activities were included:   Single leg stance on floor < 5 second hold without use of UE to recover  Tandem on floor - 15 second max with R LE forward and 12 with L LE forward x 2 trials each  Double leg balance on floor EO 30 second hold x 2  Double leg balance on floor EC max hold 20 seconds x 2  Double leg balance on airex EO 20 second hold x 2  Narrow base of support on floor with EC 30 second hold x 2 with lateral sway max     Gait training with gait belt with cues for heel strike and increased adela (use of snapping fingers for even adela) for 30 feet x 5. Retro walking x 3 for 15 feet each rep (half of patterned walk way).     Tiana received therapeutic exercises to develop strength, endurance, posture and core stabilization for 25 minutes including:   Bridge 1 x 5  Iso hip add w/ TA and pelvic floor 1 x 5  Supine hip abd with RTB 1 x 5  Sit to stand x 6 (NP)  Side stepping in parallel bars 2 laps   Seated HS stretch in chair 3 x 30 seconds  Standing gastroc stretch on  incline board 3 x 30 seconds    Written Home Exercises Provided: continue with current HEP. Increased awareness of heel strike with ambulation as well as avoidance of scissoring with gait.   Pt demo good understanding of the education provided. Tiana demonstrated good return demonstration of activities.     Assessment:   Tiana tolerated treatment well this date with frequent rest breaks to prevent DOMS. She demonstrates improved duration and stability with most balance exercises. Patient continues with mild to moderate A/P and lateral sway (R>L) with all balance exercises, worst with single leg stand. Improved heel strike with gait training however required verbal cues for increased adela to prevent scissoring and decreased balance with slower pace. Muscle quivering following supine hip add isometrics which relieved with rest.    Pt will continue to benefit from skilled PT intervention. Medical Necessity is demonstrated by:  Fall Risk, Pain limits function of effected part for some activities, requires skilled supervision to complete and progress HEP, Weakness and decreased balance.    Patient is making good progress towards established goals.    New/Revised goals: none this visit.     Short Term Goals:  4 weeks  1. Pt will improve TUG score to < 20 seconds for decreased chance of fall.   2. Pt will report ability to ambulate 10 minutes without difficulty.   3. Pt will demonstrate increased LE strength by one half grade for increased safety with ambulation.   4. Pt will demonstrate increased Parra balance scale score by 4 points for improved safety with ambulation.      Long Term Goals: 8-12 weeks  1. Pt will improve TUG score to < 12 seconds for decreased change of fall.   2. Pt will report ability to ambulate 20 minutes without difficulty.   3. Pt will demonstrate increased LE strength by one full grade for increased safety with ambulation.   4. Pt will demonstrate increased Parra balance scale score by 8 points  for improved safety with ambulation.   5. Pt will be independent with HEP and self management of symptoms.     Plan:  Continue with established Plan of Care towards PT goals.

## 2018-01-11 ENCOUNTER — DOCUMENTATION ONLY (OUTPATIENT)
Dept: REHABILITATION | Facility: HOSPITAL | Age: 68
End: 2018-01-11

## 2018-01-11 NOTE — PROGRESS NOTES
"Patient called this morning and spoke with front deck to cancel all appointments reporting she did not feel therapy was helping her. I called her back and spoke directly with the patient. She reports she is performing exercises on her Bowflex at home and feels she can do this on her own and does not need therapy. I discussed with her that myself and the PTA noticed objective improvements and discussed her shuffling gait pattern and that she needs to continue to work on heel strike to decrease shuffling gait pattern and decrease chance of fall; pt laughed and said "it is what it is" but she will try.     She is still interested in a motorized scooter. I told the patient I would contact Dr. Malone to inform him of this and that I would contact a company ([employer] or [employer]) to see about possibly getting her set up for a motorized scooter eval.   "

## 2018-01-19 DIAGNOSIS — F43.23 SITUATIONAL MIXED ANXIETY AND DEPRESSIVE DISORDER: ICD-10-CM

## 2018-01-20 RX ORDER — LORAZEPAM 0.5 MG/1
TABLET ORAL
Qty: 30 TABLET | Refills: 0 | Status: SHIPPED | OUTPATIENT
Start: 2018-01-20 | End: 2018-02-23 | Stop reason: SDUPTHER

## 2018-01-25 ENCOUNTER — HOSPITAL ENCOUNTER (OUTPATIENT)
Dept: RADIOLOGY | Facility: HOSPITAL | Age: 68
Discharge: HOME | End: 2018-01-25
Attending: INTERNAL MEDICINE
Payer: MEDICARE

## 2018-01-25 DIAGNOSIS — J44.9 COPD (CHRONIC OBSTRUCTIVE PULMONARY DISEASE): ICD-10-CM

## 2018-01-25 PROCEDURE — 71046 X-RAY EXAM CHEST 2 VIEWS: CPT | Mod: 26,,, | Performed by: RADIOLOGY

## 2018-01-25 PROCEDURE — 71046 X-RAY EXAM CHEST 2 VIEWS: CPT | Mod: TC,FY,PO

## 2018-01-30 DIAGNOSIS — F43.23 SITUATIONAL MIXED ANXIETY AND DEPRESSIVE DISORDER: ICD-10-CM

## 2018-01-31 ENCOUNTER — TELEPHONE (OUTPATIENT)
Dept: FAMILY MEDICINE | Facility: CLINIC | Age: 68
End: 2018-01-31

## 2018-01-31 NOTE — TELEPHONE ENCOUNTER
----- Message from Heather Sauer sent at 1/30/2018  3:43 PM CST -----  Patient states that the insurance needs a prior authorization for her LORazepam (ATIVAN) 0.5 MG tablet . Please call patient when this has been completed at [phone].

## 2018-02-02 RX ORDER — LORAZEPAM 0.5 MG/1
TABLET ORAL
Qty: 30 TABLET | Refills: 0 | OUTPATIENT
Start: 2018-02-02

## 2018-02-06 NOTE — TELEPHONE ENCOUNTER
Phoned pharmacy in regards to refill for RX lorazepam being requested too soon. Spoke to Will and instructed per Jamaal Barillas's instructions too soon for refill. CLC

## 2018-02-08 ENCOUNTER — HOSPITAL ENCOUNTER (OUTPATIENT)
Dept: RADIOLOGY | Facility: HOSPITAL | Age: 68
Discharge: HOME | End: 2018-02-08
Attending: NURSE PRACTITIONER
Payer: MEDICARE

## 2018-02-08 ENCOUNTER — OFFICE VISIT (OUTPATIENT)
Dept: FAMILY MEDICINE | Facility: CLINIC | Age: 68
End: 2018-02-08
Payer: MEDICARE

## 2018-02-08 VITALS
BODY MASS INDEX: 30.55 KG/M2 | TEMPERATURE: 98 F | RESPIRATION RATE: 16 BRPM | HEIGHT: 62 IN | SYSTOLIC BLOOD PRESSURE: 140 MMHG | WEIGHT: 166 LBS | DIASTOLIC BLOOD PRESSURE: 70 MMHG | HEART RATE: 100 BPM | OXYGEN SATURATION: 93 %

## 2018-02-08 DIAGNOSIS — R29.6 FREQUENT FALLS: ICD-10-CM

## 2018-02-08 DIAGNOSIS — M54.2 CERVICAL PAIN (NECK): Primary | ICD-10-CM

## 2018-02-08 DIAGNOSIS — M85.80 OSTEOPENIA, UNSPECIFIED LOCATION: ICD-10-CM

## 2018-02-08 DIAGNOSIS — M54.2 CERVICAL PAIN (NECK): ICD-10-CM

## 2018-02-08 PROCEDURE — 72040 X-RAY EXAM NECK SPINE 2-3 VW: CPT | Mod: 26,,, | Performed by: RADIOLOGY

## 2018-02-08 PROCEDURE — 72070 X-RAY EXAM THORAC SPINE 2VWS: CPT | Mod: 26,,, | Performed by: RADIOLOGY

## 2018-02-08 PROCEDURE — 72040 X-RAY EXAM NECK SPINE 2-3 VW: CPT | Mod: TC,FY,PO

## 2018-02-08 PROCEDURE — 99215 OFFICE O/P EST HI 40 MIN: CPT | Mod: PBBFAC,25,PO | Performed by: NURSE PRACTITIONER

## 2018-02-08 PROCEDURE — 99999 PR PBB SHADOW E&M-EST. PATIENT-LVL V: CPT | Mod: PBBFAC,,, | Performed by: NURSE PRACTITIONER

## 2018-02-08 PROCEDURE — 99213 OFFICE O/P EST LOW 20 MIN: CPT | Mod: S$PBB,,, | Performed by: NURSE PRACTITIONER

## 2018-02-08 PROCEDURE — 72070 X-RAY EXAM THORAC SPINE 2VWS: CPT | Mod: TC,FY,PO

## 2018-02-08 NOTE — PROGRESS NOTES
"Subjective:       Patient ID: Tiana Bosch is a 67 y.o. female.  Last seen in primary care by Faisal on 12/13/2017. First time seeing me in the clinic  Chief Complaint: Neck Pain    HPI   Neck pain began a couple of months ago and has gotten worth. States when look up "body feels weak". States when look down it hurts also. States no fall or injury prior to pain. No history of neck pain. Pain is a 9 on pain scale. Pain is pulling and tight in nature. States "get weird feeling going into head ines like a headache". She has taken Tramadol, Neurontin and Flexeril and no relief.     Vitals:    02/08/18 1016   BP: (!) 140/70   Pulse: 100   Resp: 16   Temp: 98.2 °F (36.8 °C)     Lab Results   Component Value Date    HGBA1C 7.7 (H) 08/09/2017     Review of Systems    She has fallen 4 times in past six months related to dizziness and is inquiring about a scooter.   States she has a cane and Rolator walker but she does not have either with her during this visit.   Objective:      Physical Exam   Constitutional: She is oriented to person, place, and time. Vital signs are normal. She appears well-developed and well-nourished. She is active and cooperative.   HENT:   Head: Normocephalic and atraumatic.   Right Ear: Hearing, tympanic membrane, external ear and ear canal normal.   Left Ear: Hearing, tympanic membrane, external ear and ear canal normal.   Nose: Nose normal.   Mouth/Throat: Uvula is midline, oropharynx is clear and moist and mucous membranes are normal.   Eyes: Lids are normal.   Neck: Trachea normal, normal range of motion, full passive range of motion without pain and phonation normal. Neck supple.       Cardiovascular: Normal rate and regular rhythm.    Musculoskeletal: Normal range of motion.        Arms:  Lymphadenopathy:        Head (right side): No submental, no submandibular, no tonsillar, no preauricular, no posterior auricular and no occipital adenopathy present.        Head (left side): No " submental, no submandibular, no tonsillar, no preauricular, no posterior auricular and no occipital adenopathy present.     She has no cervical adenopathy.   Neurological: She is alert and oriented to person, place, and time.   Skin: Skin is warm, dry and intact.   Psychiatric: She has a normal mood and affect. Her speech is normal and behavior is normal. Judgment and thought content normal. Cognition and memory are normal.   Nursing note and vitals reviewed.      Assessment & Plan:       Cervical pain (neck)  -     X-Ray Cervical Spine AP And Lateral; Future; Expected date: 02/08/2018  -     Vitamin D; Future; Expected date: 02/08/2018  -     Ambulatory Referral to Physical/Occupational Therapy  -     X-Ray Thoracic Spine AP Lateral; Future; Expected date: 02/08/2018    Frequent falls  -     X-Ray Thoracic Spine AP Lateral; Future; Expected date: 02/08/2018    Osteopenia, unspecified location  -     X-Ray Cervical Spine AP And Lateral; Future; Expected date: 02/08/2018  -     Vitamin D; Future; Expected date: 02/08/2018  -     X-Ray Thoracic Spine AP Lateral; Future; Expected date: 02/08/2018          No Follow-up on file.

## 2018-02-10 RX ORDER — MOMETASONE FUROATE AND FORMOTEROL FUMARATE DIHYDRATE 200; 5 UG/1; UG/1
AEROSOL RESPIRATORY (INHALATION)
Qty: 13 INHALER | Refills: 2 | Status: SHIPPED | OUTPATIENT
Start: 2018-02-10 | End: 2018-02-21 | Stop reason: DRUGHIGH

## 2018-02-12 ENCOUNTER — CLINICAL SUPPORT (OUTPATIENT)
Dept: REHABILITATION | Facility: HOSPITAL | Age: 68
End: 2018-02-12
Payer: MEDICARE

## 2018-02-12 DIAGNOSIS — M54.2 NECK PAIN: ICD-10-CM

## 2018-02-12 DIAGNOSIS — M62.81 MUSCLE WEAKNESS OF UPPER EXTREMITY: ICD-10-CM

## 2018-02-12 PROCEDURE — 97161 PT EVAL LOW COMPLEX 20 MIN: CPT | Mod: PN

## 2018-02-12 PROCEDURE — G8979 MOBILITY GOAL STATUS: HCPCS | Mod: CK,PN

## 2018-02-12 PROCEDURE — G8978 MOBILITY CURRENT STATUS: HCPCS | Mod: CL,PN

## 2018-02-12 PROCEDURE — 97110 THERAPEUTIC EXERCISES: CPT | Mod: PN
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
## 2018-02-14 ENCOUNTER — CLINICAL SUPPORT (OUTPATIENT)
Dept: REHABILITATION | Facility: HOSPITAL | Age: 68
End: 2018-02-14
Payer: MEDICARE

## 2018-02-14 DIAGNOSIS — M62.81 MUSCLE WEAKNESS OF UPPER EXTREMITY: ICD-10-CM

## 2018-02-14 DIAGNOSIS — M54.2 NECK PAIN: ICD-10-CM

## 2018-02-14 PROCEDURE — 97110 THERAPEUTIC EXERCISES: CPT | Mod: PN

## 2018-02-14 NOTE — PROGRESS NOTES
Name: Tiana Bosch  Clinic Number: [id number]  Date of Treatment: 02/14/2018   Diagnosis:   Encounter Diagnoses   Name Primary?    Neck pain     Muscle weakness of upper extremity        Visit number: 2  Start date: 02/12/2018  POC End date: 05/07/2018    Time in: 10:00  Time Out: 10:54  Total Treatment Time: 50  1:1 Time: 50    Precautions: hx of falls, COPD, HTN, DM type 2, fibromyalgia    Subjective:    Tiana reports no change in symptoms to the neck. States certain movements still hurt. Patient reports their neck pain to be 8/10 on a 0-10 scale with 0 being no pain and 10 being the worst pain imaginable. States her neck pain is aching, which tends to turn into a headache. Did not perform putty exercises at home since initial visit.    Objective    Tiana received therapeutic exercises to develop strength, endurance and flexibility for 50 minutes including:      squeeze with yellow digiflex  Tip to tip and key  with large yellow clothespin  Cervical isometrics in all planes (vc to avoid movement or forceful contraction) 10x3 sec each  Seated cervical retraction 10x5 sec  ADDED:  Supine pec stretch  Seated L UT stretch 5 x 10 sec  Seated posterior shoulder roll, 3 sec hold in depression x 10  Scap retract with YTB x10 (reports onset of pain into L shoulder near AC joint)  Educated pt on standing breaks to avoid prolonged sitting (breaks every 20 min).  Perform ex during commercials.    Pt received very light pain free MFR to cervical paraspinals L > R in supine position x 3 min     Pt educated on importance of proper posture (avoid fwd head) especially while sitting to watch tv.  Pt and  instructed to either use different chair without excessive head pillow or modify with a pillow behind back to avoid fwd head.  Both gave verbal understanding. Patient also educated on trying use of a heating pad or warm bath in the mornings prior to activity to help reduce pain and improved circulation.      HEP  provided: pt given verbal and written instruction for additional posterior shoulder roll and seated L UT stretch, educated on need to perform all written home exercises on days not attending therapy.  Instructed pt. Regarding: Proper technique with all exercises. Pt demo good understanding of the education provided. Tiana demonstrated good return demonstration of activities.    Assessment:     Tiana demonstrated overall good tolerance to treatment this date. She reported onset of mild pain into L shoulder near AC joint with resisted scap retractions, improved with rest. Reported 7/10 pain to neck post treatment. Tenderness at L cervical paraspinals with manual treatment initially, decreased with lighter pressure. She presents with periscapular and cervical weakness with seated and standing exercises. Difficulty with gripping yellow digiflex and clothespin.  Pt will continue to benefit from skilled PT intervention to address the remaining functional deficits, maximize pt's level of independence in the home and community environment, as well as Pain limits function of effected part for some activities, Unable to participate fully in daily activities and Weakness.    Patient is making good progress towards established goals.    Short Term Goals: 4 weeks  1.Report decreased neck pain  <   / =  4  /10  to increase tolerance for sitting x 10-15 min  2. Pt to report adjusting chair and sitting position to avoid fwd head while watching tv.  3. Increased cervical strength by 1/3 muscle grade in all deficient planes to increase tolerance for ADLs.  4. Increased  strength by 5-10 lbs for increased ease holding cup and opening jars  5. Pt to tolerate HEP to improve ROM and independence with ADL's  6. Increased tip to tip strength by 2 lbs for increased ease with buttoning clothes      Long Term Goals: 8-12 weeks  1.Report decreased neck pain  <   / =  3  /10  to increase tolerance for sitting x 10-15 min  2. Pt to  demonstrated understanding of proper sitting posture with proper head alignment to avoid headaches  3. Increased cervical strength to 4+/5 to 5/5 in all deficient planes to increase tolerance for ADLs.  4. Increased  strength by 10 lbs for increased ease holding cup and opening jars  5. Pt to tolerate HEP to improve ROM and independence with ADL's    Plan:  Continue with established Plan of Care towards PT goals.

## 2018-02-14 NOTE — PATIENT INSTRUCTIONS
Neck Side-Bending Stretch        Begin with chin level, head centered over spine. Slowly lower right ear toward Right shoulder until stretch is felt on the left. Hold 10-20 seconds. Slowly return to starting position. Repeat.  Repeat 5 times. DO NOT perform to opposite direction.  Copyright © [employer]. All rights reserved.     Shoulder Roll        Move shoulders up, back, then down and pause for 3 seconds. Relax and repeat 10 times. Perform 2 sets of 10 repetitions, 2 times a day.  Copyright © [employer]. All rights reserved.

## 2018-02-21 ENCOUNTER — OFFICE VISIT (OUTPATIENT)
Dept: FAMILY MEDICINE | Facility: CLINIC | Age: 68
End: 2018-02-21
Payer: MEDICARE

## 2018-02-21 VITALS
HEIGHT: 62 IN | BODY MASS INDEX: 31.16 KG/M2 | SYSTOLIC BLOOD PRESSURE: 142 MMHG | HEART RATE: 84 BPM | TEMPERATURE: 98 F | DIASTOLIC BLOOD PRESSURE: 80 MMHG | WEIGHT: 169.31 LBS

## 2018-02-21 DIAGNOSIS — R06.7 SNEEZE: ICD-10-CM

## 2018-02-21 DIAGNOSIS — J30.9 CHRONIC ALLERGIC RHINITIS, UNSPECIFIED SEASONALITY, UNSPECIFIED TRIGGER: Primary | ICD-10-CM

## 2018-02-21 DIAGNOSIS — R05.9 COUGH IN ADULT: ICD-10-CM

## 2018-02-21 PROCEDURE — 99213 OFFICE O/P EST LOW 20 MIN: CPT | Mod: S$PBB,,, | Performed by: NURSE PRACTITIONER

## 2018-02-21 PROCEDURE — 99999 PR PBB SHADOW E&M-EST. PATIENT-LVL V: CPT | Mod: PBBFAC,,, | Performed by: NURSE PRACTITIONER

## 2018-02-21 PROCEDURE — 99215 OFFICE O/P EST HI 40 MIN: CPT | Mod: PBBFAC,PO | Performed by: NURSE PRACTITIONER

## 2018-02-21 RX ORDER — LEVOCETIRIZINE DIHYDROCHLORIDE 5 MG/1
5 TABLET, FILM COATED ORAL NIGHTLY
Qty: 30 TABLET | Refills: 3 | Status: SHIPPED | OUTPATIENT
Start: 2018-02-21 | End: 2018-04-10

## 2018-02-21 NOTE — PATIENT INSTRUCTIONS
Saline nasal spray in shower at night  Use a mask when cutting grass  Please use your Atrovent nasal spray  Contact clinic if temp greater than 100

## 2018-02-21 NOTE — PROGRESS NOTES
Subjective:       Patient ID: Tiana Bosch is a 67 y.o. female.  She was last seen in primary care by me on 02/08/2018. She last saw Faisal on 12/13/2017.   Chief Complaint: Cough; sneezing; and itchy eyes    Cough   This is a new problem. The current episode started in the past 7 days (3 days). The problem has been gradually worsening. The problem occurs every few hours. The cough is productive of sputum. Pertinent negatives include no fever. Associated symptoms comments: Itchy eyes, sneezing. The symptoms are aggravated by pollens. She has tried nothing for the symptoms. There is no history of asthma or pneumonia.   She did cut her grass on yesterday without the use of a mask.   Vitals:    02/21/18 1313   BP: (!) 142/80   Pulse: 84   Temp: 98.1 °F (36.7 °C)     BP Readings from Last 3 Encounters:   02/21/18 (!) 142/80   02/08/18 (!) 140/70   12/18/17 123/68     Review of Systems   Constitutional: Negative for fever.   HENT: Positive for sneezing.    Eyes: Positive for itching.   Respiratory: Positive for cough.    Musculoskeletal: Positive for arthralgias.   All other systems reviewed and are negative.      Objective:      Physical Exam   Constitutional: She is oriented to person, place, and time. She appears well-developed and well-nourished. She is active and cooperative.   She uses a straight cane for ambulation. She is asking about a motorized vehicle because of complaints of pain and inability to walk long distances.    HENT:   Head: Normocephalic and atraumatic.   Right Ear: Hearing, tympanic membrane, external ear and ear canal normal.   Left Ear: Hearing, tympanic membrane, external ear and ear canal normal.   Nose: Nose normal.       Mouth/Throat: Uvula is midline, oropharynx is clear and moist and mucous membranes are normal.   Eyes: Lids are normal.   Neck: Trachea normal, normal range of motion, full passive range of motion without pain and phonation normal. Neck supple.   Cardiovascular: Normal  rate, regular rhythm and normal heart sounds.    Pulmonary/Chest: Effort normal and breath sounds normal.   Abdominal: Soft. There is no splenomegaly or hepatomegaly. There is no tenderness.   Lymphadenopathy:        Head (right side): No submental, no submandibular, no tonsillar, no preauricular, no posterior auricular and no occipital adenopathy present.        Head (left side): No submental, no submandibular, no tonsillar, no preauricular, no posterior auricular and no occipital adenopathy present.     She has no cervical adenopathy.   Neurological: She is alert and oriented to person, place, and time.   Skin: Skin is warm, dry and intact.   Psychiatric: She has a normal mood and affect. Her speech is normal and behavior is normal. Judgment and thought content normal. Cognition and memory are normal.   Nursing note and vitals reviewed.      Assessment & Plan:       Chronic allergic rhinitis, unspecified seasonality, unspecified trigger  -     levocetirizine (XYZAL) 5 MG tablet; Take 1 tablet (5 mg total) by mouth every evening.  Dispense: 30 tablet; Refill: 3    Sneeze    Cough in adult    She has been instructed to take her Atrovent nasal spray at home the she has not been using.         Saline nasal spray in shower at night  Use a mask when cutting grass  Please use your Atrovent nasal spray  Contact clinic if temp greater than 100    Follow-up if symptoms worsen or fail to improve.

## 2018-02-22 ENCOUNTER — CLINICAL SUPPORT (OUTPATIENT)
Dept: REHABILITATION | Facility: HOSPITAL | Age: 68
End: 2018-02-22
Payer: MEDICARE

## 2018-02-22 DIAGNOSIS — M62.81 MUSCLE WEAKNESS OF UPPER EXTREMITY: ICD-10-CM

## 2018-02-22 DIAGNOSIS — M54.2 NECK PAIN: ICD-10-CM

## 2018-02-22 PROCEDURE — 97110 THERAPEUTIC EXERCISES: CPT | Mod: PN

## 2018-02-22 NOTE — PROGRESS NOTES
[id number]  Date of Treatment: 02/22/2018   Diagnosis:   Encounter Diagnoses   Name Primary?    Neck pain     Muscle weakness of upper extremity        Visit number: 3  Start date: 02/12/2018  POC End date: 05/07/2018    Time in: 9:58  Time Out: 10:54  Total Treatment Time: 50  1:1 Time: 50    Precautions: hx of falls, COPD, HTN, DM type 2, fibromyalgia    Subjective:    Tiana reports worsening of symptoms into the neck and low back. States she is only able to perform a rowing exercise and a leg press exercises on her Bowflex machine. Patient reports their neck pain to be "I don't know, 15, 50"/10 on a 0-10 scale with 0 being no pain and 10 being the worst pain imaginable. Describes pain from lower cervical spine up into occipital region of skull, causing very slight blurriness. States she was not able to perform her putty exercises because of pain.    Objective    Tiana received therapeutic exercises to develop strength, endurance and flexibility for 50 minutes including:      squeeze with yellow digiflex   Individual finger  squeeze with yellow digiflex   Tip to tip and key  with large yellow clothespin  Cervical isometrics in all planes supine (vc to avoid movement or forceful contraction) 10x3 sec each  Seated cervical retraction 10x5 sec  Supine pec stretch  Seated L UT stretch 5 x 10 sec  Seated posterior shoulder roll, 3 sec hold in depression x 10  Seated scap retract with YTB x10 (reports onset of pain into L shoulder near AC joint)    Pt received very light pain free MFR to cervical paraspinals L > R in supine position x 0 min - not performed this date     Pt given continued education on importance of proper posture (avoid fwd head) with daily activities as well as trying use of a heating pad or warm bath in the mornings prior to activity to help reduce pain and improved circulation due to fibromyalgia. Pt gave verbal understanding.      HEP provided: pt " instructed to continue with current HEP with increased compliance. Educated pt on importance of performing HEP at home to improve postural strength and stability.  Instructed pt. Regarding: Proper technique with all exercises. Pt demo good understanding of the education provided. Tiana demonstrated good return demonstration of activities.    Assessment:     Tiana demonstrated overall good tolerance to treatment this date. No progression of exercises due to patient reporting significant pain for 2 days following previous session. Able to perform all exercises withount onset of pain except for seated scap retraction in which she reported pain posterior cervical spine. That pain relieved with rest, and was likely muscle fatigue of cervical paraspinals from stabilizing spine. Improved tolerance to gripping exercises with clothespin and digiflex. Manual techniques not performed this date due to patient having increased pain at previous visit and patient's fibromyalgia may cause decreased tolerance to STM. No pain rating given to neck post treatment.  Pt will continue to benefit from skilled PT intervention to address the remaining functional deficits, maximize pt's level of independence in the home and community environment, as well as Pain limits function of effected part for some activities, Unable to participate fully in daily activities and Weakness.    Patient is making good progress towards established goals.    Short Term Goals: 4 weeks  1.Report decreased neck pain  <   / =  4  /10  to increase tolerance for sitting x 10-15 min  2. Pt to report adjusting chair and sitting position to avoid fwd head while watching tv.  3. Increased cervical strength by 1/3 muscle grade in all deficient planes to increase tolerance for ADLs.  4. Increased  strength by 5-10 lbs for increased ease holding cup and opening jars  5. Pt to tolerate HEP to improve ROM and independence with ADL's  6. Increased tip to tip strength by 2  lbs for increased ease with buttoning clothes      Long Term Goals: 8-12 weeks  1.Report decreased neck pain  <   / =  3  /10  to increase tolerance for sitting x 10-15 min  2. Pt to demonstrated understanding of proper sitting posture with proper head alignment to avoid headaches  3. Increased cervical strength to 4+/5 to 5/5 in all deficient planes to increase tolerance for ADLs.  4. Increased  strength by 10 lbs for increased ease holding cup and opening jars  5. Pt to tolerate HEP to improve ROM and independence with ADL's    Plan:  Continue with established Plan of Care towards PT goals.

## 2018-02-23 DIAGNOSIS — F43.23 SITUATIONAL MIXED ANXIETY AND DEPRESSIVE DISORDER: ICD-10-CM

## 2018-02-26 RX ORDER — LORAZEPAM 0.5 MG/1
TABLET ORAL
Qty: 30 TABLET | Refills: 0 | Status: SHIPPED | OUTPATIENT
Start: 2018-02-26 | End: 2018-03-30 | Stop reason: SDUPTHER

## 2018-03-02 ENCOUNTER — TELEPHONE (OUTPATIENT)
Dept: FAMILY MEDICINE | Facility: CLINIC | Age: 68
End: 2018-03-02

## 2018-03-02 NOTE — TELEPHONE ENCOUNTER
----- Message from Karolina Lara sent at 3/2/2018  9:53 AM CST -----  Contact: Patient  Tiana, patient [phone] calling because [facility] has questions regarding LORazepam (ATIVAN) 0.5 MG tablet, patient states that they will not approve prescription until they hear from the office. Please advise. Thanks.    [facility]  [phone]

## 2018-03-03 ENCOUNTER — PATIENT MESSAGE (OUTPATIENT)
Dept: FAMILY MEDICINE | Facility: CLINIC | Age: 68
End: 2018-03-03

## 2018-03-05 NOTE — TELEPHONE ENCOUNTER
Dr Malone, [employer] does power scooter evals. Can you please have nurse fax Demographic sheet and order for Power Mobility Eval to [phone].Thank you.

## 2018-03-19 ENCOUNTER — PATIENT MESSAGE (OUTPATIENT)
Dept: FAMILY MEDICINE | Facility: CLINIC | Age: 68
End: 2018-03-19

## 2018-03-19 RX ORDER — BLOOD SUGAR DIAGNOSTIC
STRIP MISCELLANEOUS
Qty: 100 STRIP | Refills: 0 | OUTPATIENT
Start: 2018-03-19

## 2018-03-20 ENCOUNTER — PATIENT MESSAGE (OUTPATIENT)
Dept: FAMILY MEDICINE | Facility: CLINIC | Age: 68
End: 2018-03-20

## 2018-03-21 ENCOUNTER — OFFICE VISIT (OUTPATIENT)
Dept: URGENT CARE | Facility: CLINIC | Age: 68
End: 2018-03-21
Payer: MEDICARE

## 2018-03-21 VITALS
TEMPERATURE: 98 F | OXYGEN SATURATION: 89 % | HEART RATE: 96 BPM | DIASTOLIC BLOOD PRESSURE: 77 MMHG | WEIGHT: 169 LBS | SYSTOLIC BLOOD PRESSURE: 123 MMHG | BODY MASS INDEX: 30.91 KG/M2

## 2018-03-21 DIAGNOSIS — K08.89 PAIN, DENTAL: ICD-10-CM

## 2018-03-21 DIAGNOSIS — K11.8 PAROTID GLAND PAIN: Primary | ICD-10-CM

## 2018-03-21 PROCEDURE — 99214 OFFICE O/P EST MOD 30 MIN: CPT | Mod: 25,S$GLB,, | Performed by: PHYSICIAN ASSISTANT

## 2018-03-21 PROCEDURE — 96372 THER/PROPH/DIAG INJ SC/IM: CPT | Mod: S$GLB,,, | Performed by: FAMILY MEDICINE

## 2018-03-21 RX ORDER — FLUTICASONE PROPIONATE 50 MCG
1 SPRAY, SUSPENSION (ML) NASAL DAILY
Qty: 1 BOTTLE | Refills: 1 | Status: SHIPPED | OUTPATIENT
Start: 2018-03-21 | End: 2019-01-01 | Stop reason: SDUPTHER

## 2018-03-21 RX ORDER — BETAMETHASONE SODIUM PHOSPHATE AND BETAMETHASONE ACETATE 3; 3 MG/ML; MG/ML
6 INJECTION, SUSPENSION INTRA-ARTICULAR; INTRALESIONAL; INTRAMUSCULAR; SOFT TISSUE
Status: COMPLETED | OUTPATIENT
Start: 2018-03-21 | End: 2018-03-21

## 2018-03-21 RX ADMIN — BETAMETHASONE SODIUM PHOSPHATE AND BETAMETHASONE ACETATE 6 MG: 3; 3 INJECTION, SUSPENSION INTRA-ARTICULAR; INTRALESIONAL; INTRAMUSCULAR; SOFT TISSUE at 04:03

## 2018-03-21 NOTE — PROGRESS NOTES
Subjective:       Patient ID: Tiana Bosch is a 67 y.o. female.    Vitals:  weight is 76.7 kg (169 lb). Her oral temperature is 98.3 °F (36.8 °C). Her blood pressure is 123/77 and her pulse is 96. Her oxygen saturation is 89% (abnormal).     Chief Complaint: Oral Pain    Patient states she has an irritation on the left side of her mouth into her neck and left ear and she also states she has a headache.       Oral Pain    This is a new problem. The current episode started in the past 7 days. The problem occurs constantly. The problem has been gradually worsening. The pain is at a severity of 5/10. The pain is moderate. Associated symptoms include facial pain. Pertinent negatives include no fever. She has tried nothing for the symptoms. The treatment provided no relief.     Review of Systems   Constitution: Negative for chills and fever.   HENT: Negative for sore throat.    Eyes: Negative for blurred vision.   Cardiovascular: Negative for chest pain.   Respiratory: Negative for shortness of breath.    Skin: Negative for rash.   Musculoskeletal: Negative for back pain and joint pain.   Gastrointestinal: Negative for abdominal pain, diarrhea, nausea and vomiting.   Neurological: Negative for headaches.   Psychiatric/Behavioral: The patient is not nervous/anxious.        Objective:      Physical Exam   Constitutional: She is oriented to person, place, and time. She appears well-developed and well-nourished. She is cooperative.  Non-toxic appearance. She does not appear ill. No distress.   HENT:   Head: Normocephalic and atraumatic.       Right Ear: Hearing, tympanic membrane, external ear and ear canal normal.   Left Ear: Hearing, tympanic membrane, external ear and ear canal normal.   Nose: Nose normal. No mucosal edema, rhinorrhea or nasal deformity. No epistaxis. Right sinus exhibits no maxillary sinus tenderness and no frontal sinus tenderness. Left sinus exhibits no maxillary sinus tenderness and no frontal  sinus tenderness.   Mouth/Throat: Uvula is midline, oropharynx is clear and moist and mucous membranes are normal. No trismus in the jaw. Normal dentition. No uvula swelling. No posterior oropharyngeal erythema.       Eyes: Conjunctivae and lids are normal. Right eye exhibits no discharge. Left eye exhibits no discharge. No scleral icterus.   Sclera clear bilat   Neck: Trachea normal, normal range of motion, full passive range of motion without pain and phonation normal. Neck supple.   Cardiovascular: Normal rate, regular rhythm, normal heart sounds, intact distal pulses and normal pulses.    Pulmonary/Chest: Effort normal and breath sounds normal. No respiratory distress.   Abdominal: Soft. Normal appearance and bowel sounds are normal. She exhibits no distension, no pulsatile midline mass and no mass. There is no tenderness.   Musculoskeletal: Normal range of motion. She exhibits no edema or deformity.   Neurological: She is alert and oriented to person, place, and time. She exhibits normal muscle tone. Coordination normal.   Skin: Skin is warm, dry and intact. She is not diaphoretic. No pallor.   Psychiatric: She has a normal mood and affect. Her speech is normal and behavior is normal. Judgment and thought content normal. Cognition and memory are normal.   Nursing note and vitals reviewed.      Assessment:       1. Parotid gland pain    2. Pain, dental        Plan:         Parotid gland pain    Pain, dental    Other orders  -     betamethasone acetate-betamethasone sodium phosphate injection 6 mg; Inject 1 mL (6 mg total) into the muscle one time.  -     fluticasone (FLONASE) 50 mcg/actuation nasal spray; 1 spray (50 mcg total) by Each Nare route once daily.  Dispense: 1 Bottle; Refill: 1      As above. I suggested she return to her dentist to evaluate the tooth. Warm compresses on the face, if no resolution in 2-3 days, I suggest PCP follow up, for which she already has an appointment. I discussed with the  patient the importance of follow up if their symptoms have not resolved in 1-2 week's time. Patient verbalized understanding and will RTC or go to the nearest ER if symptoms persist or worsen.

## 2018-03-22 ENCOUNTER — PATIENT MESSAGE (OUTPATIENT)
Dept: FAMILY MEDICINE | Facility: CLINIC | Age: 68
End: 2018-03-22

## 2018-03-22 DIAGNOSIS — Z12.39 SCREENING FOR BREAST CANCER: Primary | ICD-10-CM

## 2018-03-27 ENCOUNTER — TELEPHONE (OUTPATIENT)
Dept: ADMINISTRATIVE | Facility: HOSPITAL | Age: 68
End: 2018-03-27

## 2018-03-27 NOTE — TELEPHONE ENCOUNTER
Health Maintenance Due   Topic Date Due    Pneumococcal (65+) (1 of 2 - PCV13) 07/29/2017    Hemoglobin A1c  02/09/2018    Mammogram  03/21/2018    Foot Exam  05/01/2018

## 2018-03-30 ENCOUNTER — PATIENT MESSAGE (OUTPATIENT)
Dept: FAMILY MEDICINE | Facility: CLINIC | Age: 68
End: 2018-03-30

## 2018-03-30 DIAGNOSIS — F43.23 SITUATIONAL MIXED ANXIETY AND DEPRESSIVE DISORDER: ICD-10-CM

## 2018-04-03 ENCOUNTER — LAB VISIT (OUTPATIENT)
Dept: LAB | Facility: HOSPITAL | Age: 68
End: 2018-04-03
Attending: FAMILY MEDICINE
Payer: MEDICARE

## 2018-04-03 DIAGNOSIS — E11.42 TYPE 2 DIABETES MELLITUS WITH DIABETIC POLYNEUROPATHY, WITHOUT LONG-TERM CURRENT USE OF INSULIN: ICD-10-CM

## 2018-04-03 DIAGNOSIS — E78.5 HYPERLIPIDEMIA LDL GOAL <70: ICD-10-CM

## 2018-04-03 DIAGNOSIS — I10 ESSENTIAL HYPERTENSION: Chronic | ICD-10-CM

## 2018-04-03 LAB
ALBUMIN SERPL BCP-MCNC: 4.2 G/DL
ALP SERPL-CCNC: 131 U/L
ALT SERPL W/O P-5'-P-CCNC: 21 U/L
ANION GAP SERPL CALC-SCNC: 10 MMOL/L
AST SERPL-CCNC: 19 U/L
BASOPHILS # BLD AUTO: 0.05 K/UL
BASOPHILS NFR BLD: 0.6 %
BILIRUB SERPL-MCNC: 0.4 MG/DL
BUN SERPL-MCNC: 27 MG/DL
CALCIUM SERPL-MCNC: 10.2 MG/DL
CHLORIDE SERPL-SCNC: 99 MMOL/L
CHOLEST SERPL-MCNC: 163 MG/DL
CHOLEST/HDLC SERPL: 3.8 {RATIO}
CO2 SERPL-SCNC: 27 MMOL/L
CREAT SERPL-MCNC: 1.2 MG/DL
DIFFERENTIAL METHOD: ABNORMAL
EOSINOPHIL # BLD AUTO: 0.2 K/UL
EOSINOPHIL NFR BLD: 1.8 %
ERYTHROCYTE [DISTWIDTH] IN BLOOD BY AUTOMATED COUNT: 13 %
EST. GFR  (AFRICAN AMERICAN): 54.8 ML/MIN/1.73 M^2
EST. GFR  (NON AFRICAN AMERICAN): 47.5 ML/MIN/1.73 M^2
ESTIMATED AVG GLUCOSE: 177 MG/DL
GLUCOSE SERPL-MCNC: 149 MG/DL
HBA1C MFR BLD HPLC: 7.8 %
HCT VFR BLD AUTO: 41.4 %
HDLC SERPL-MCNC: 43 MG/DL
HDLC SERPL: 26.4 %
HGB BLD-MCNC: 13.2 G/DL
IMM GRANULOCYTES # BLD AUTO: 0.03 K/UL
IMM GRANULOCYTES NFR BLD AUTO: 0.4 %
LDLC SERPL CALC-MCNC: 72.2 MG/DL
LYMPHOCYTES # BLD AUTO: 1.7 K/UL
LYMPHOCYTES NFR BLD: 20.5 %
MCH RBC QN AUTO: 31.1 PG
MCHC RBC AUTO-ENTMCNC: 31.9 G/DL
MCV RBC AUTO: 97 FL
MONOCYTES # BLD AUTO: 0.8 K/UL
MONOCYTES NFR BLD: 9.9 %
NEUTROPHILS # BLD AUTO: 5.6 K/UL
NEUTROPHILS NFR BLD: 66.8 %
NONHDLC SERPL-MCNC: 120 MG/DL
NRBC BLD-RTO: 0 /100 WBC
PLATELET # BLD AUTO: 348 K/UL
PMV BLD AUTO: 9.3 FL
POTASSIUM SERPL-SCNC: 5.1 MMOL/L
PROT SERPL-MCNC: 8.3 G/DL
RBC # BLD AUTO: 4.25 M/UL
SODIUM SERPL-SCNC: 136 MMOL/L
TRIGL SERPL-MCNC: 239 MG/DL
TSH SERPL DL<=0.005 MIU/L-ACNC: 2.35 UIU/ML
WBC # BLD AUTO: 8.41 K/UL

## 2018-04-03 PROCEDURE — 36415 COLL VENOUS BLD VENIPUNCTURE: CPT | Mod: PO

## 2018-04-03 PROCEDURE — 83036 HEMOGLOBIN GLYCOSYLATED A1C: CPT

## 2018-04-03 PROCEDURE — 80061 LIPID PANEL: CPT

## 2018-04-03 PROCEDURE — 84443 ASSAY THYROID STIM HORMONE: CPT

## 2018-04-03 PROCEDURE — 80053 COMPREHEN METABOLIC PANEL: CPT

## 2018-04-03 PROCEDURE — 85025 COMPLETE CBC W/AUTO DIFF WBC: CPT

## 2018-04-03 RX ORDER — LORAZEPAM 0.5 MG/1
0.5 TABLET ORAL DAILY PRN
Qty: 30 TABLET | Refills: 0 | Status: SHIPPED | OUTPATIENT
Start: 2018-04-03 | End: 2018-04-27 | Stop reason: SDUPTHER

## 2018-04-10 ENCOUNTER — OFFICE VISIT (OUTPATIENT)
Dept: FAMILY MEDICINE | Facility: CLINIC | Age: 68
End: 2018-04-10
Payer: MEDICARE

## 2018-04-10 ENCOUNTER — TELEPHONE (OUTPATIENT)
Dept: FAMILY MEDICINE | Facility: CLINIC | Age: 68
End: 2018-04-10

## 2018-04-10 VITALS
WEIGHT: 168.44 LBS | SYSTOLIC BLOOD PRESSURE: 124 MMHG | HEIGHT: 62 IN | DIASTOLIC BLOOD PRESSURE: 66 MMHG | HEART RATE: 88 BPM | BODY MASS INDEX: 31 KG/M2

## 2018-04-10 DIAGNOSIS — I10 ESSENTIAL HYPERTENSION: Chronic | ICD-10-CM

## 2018-04-10 DIAGNOSIS — M79.7 FIBROMYALGIA: Chronic | ICD-10-CM

## 2018-04-10 DIAGNOSIS — J44.9 COPD, SEVERE: ICD-10-CM

## 2018-04-10 DIAGNOSIS — J96.11 CHRONIC RESPIRATORY FAILURE WITH HYPOXIA: ICD-10-CM

## 2018-04-10 DIAGNOSIS — Z23 NEED FOR PNEUMOCOCCAL VACCINE: ICD-10-CM

## 2018-04-10 DIAGNOSIS — E78.5 HYPERLIPIDEMIA LDL GOAL <70: ICD-10-CM

## 2018-04-10 DIAGNOSIS — E11.42 TYPE 2 DIABETES MELLITUS WITH DIABETIC POLYNEUROPATHY, WITHOUT LONG-TERM CURRENT USE OF INSULIN: Primary | ICD-10-CM

## 2018-04-10 PROCEDURE — G0009 ADMIN PNEUMOCOCCAL VACCINE: HCPCS | Mod: PBBFAC,PO

## 2018-04-10 PROCEDURE — 99213 OFFICE O/P EST LOW 20 MIN: CPT | Mod: PBBFAC,PO,25 | Performed by: FAMILY MEDICINE

## 2018-04-10 PROCEDURE — 99999 PR PBB SHADOW E&M-EST. PATIENT-LVL III: CPT | Mod: PBBFAC,,, | Performed by: FAMILY MEDICINE

## 2018-04-10 PROCEDURE — 99214 OFFICE O/P EST MOD 30 MIN: CPT | Mod: S$PBB,,, | Performed by: FAMILY MEDICINE

## 2018-04-10 RX ORDER — GLIPIZIDE 5 MG/1
5 TABLET ORAL
Qty: 180 TABLET | Refills: 1 | Status: SHIPPED | OUTPATIENT
Start: 2018-04-10 | End: 2018-09-12 | Stop reason: SDUPTHER

## 2018-04-10 RX ORDER — PREDNISONE 5 MG/1
5 TABLET ORAL DAILY
Qty: 7 TABLET | Refills: 0 | Status: SHIPPED | OUTPATIENT
Start: 2018-04-10 | End: 2018-04-17

## 2018-04-10 RX ORDER — LANCETS 33 GAUGE
1 EACH MISCELLANEOUS DAILY
Qty: 100 EACH | Refills: 3 | Status: SHIPPED | OUTPATIENT
Start: 2018-04-10 | End: 2019-01-01 | Stop reason: SDUPTHER

## 2018-04-10 NOTE — TELEPHONE ENCOUNTER
How do you place order for scooter eval? Needs to be sent to [facility] fax # [phone] along with demographic.

## 2018-04-10 NOTE — PROGRESS NOTES
Subjective:       Patient ID: Tiana Bosch is a 67 y.o. female.    Chief Complaint: Diabetes and Hypertension    Here for f/u htn and dm II. Doing well overall but dealing with leg pain still from fibro.      Diabetes   She presents for her follow-up diabetic visit. She has type 2 diabetes mellitus. Her disease course has been stable. Pertinent negatives for hypoglycemia include no nervousness/anxiousness. Associated symptoms include fatigue. Pertinent negatives for diabetes include no chest pain.   Hypertension   This is a chronic problem. The current episode started more than 1 year ago. The problem is controlled. Pertinent negatives include no chest pain, palpitations or shortness of breath.   Hyperlipidemia   This is a chronic problem. The current episode started more than 1 year ago. The problem is controlled. Pertinent negatives include no chest pain or shortness of breath.     Review of Systems   Constitutional: Positive for fatigue. Negative for chills and fever.   Respiratory: Negative for cough, chest tightness and shortness of breath.    Cardiovascular: Negative for chest pain, palpitations and leg swelling.   Gastrointestinal: Negative for abdominal distention and abdominal pain.   Endocrine: Negative for cold intolerance and heat intolerance.   Skin: Negative for rash.   Psychiatric/Behavioral: Negative for dysphoric mood. The patient is not nervous/anxious.        Objective:      Physical Exam   Constitutional: She appears well-developed and well-nourished.   HENT:   Head: Normocephalic and atraumatic.   Cardiovascular: Normal rate, regular rhythm and normal heart sounds.    Pulmonary/Chest: Effort normal and breath sounds normal.   Psychiatric: She has a normal mood and affect.   Nursing note and vitals reviewed.      Assessment:       1. Type 2 diabetes mellitus with diabetic polyneuropathy, without long-term current use of insulin    2. Need for pneumococcal vaccine    3. Fibromyalgia    4.  Essential hypertension    5. Hyperlipidemia LDL goal <70    6. COPD, severe    7. Chronic respiratory failure with hypoxia        Plan:       Type 2 diabetes mellitus with diabetic polyneuropathy, without long-term current use of insulin  -     blood sugar diagnostic (ONETOUCH ULTRA TEST) Strp; TEST ONE TIME AMAYA.  Dispense: 100 strip; Refill: 3  -     lancets (ONETOUCH DELICA LANCETS) 33 gauge Misc; 1 lancet by Misc.(Non-Drug; Combo Route) route Daily. Use to  Dispense: 100 each; Refill: 3    Need for pneumococcal vaccine  -     (In Office Administered) Pneumococcal Conjugate Vaccine (13 Valent) (IM)    Fibromyalgia    Essential hypertension    Hyperlipidemia LDL goal <70    COPD, severe    Chronic respiratory failure with hypoxia    Other orders  -     glipiZIDE (GLUCOTROL) 5 MG tablet; Take 1 tablet (5 mg total) by mouth 2 (two) times daily before meals.  Dispense: 180 tablet; Refill: 1  -     predniSONE (DELTASONE) 5 MG tablet; Take 1 tablet (5 mg total) by mouth once daily.  Dispense: 7 tablet; Refill: 0      Reviewed recent labs. Diet/exercise and wt loss.  Add another agent for DM II.    Follow-up in about 3 months (around 7/10/2018), or if symptoms worsen or fail to improve.

## 2018-04-11 NOTE — TELEPHONE ENCOUNTER
The person I spoke with at [employer] said they do not have a form, states we send a request for lisandro regalado and a demographics sheet.

## 2018-04-12 ENCOUNTER — OFFICE VISIT (OUTPATIENT)
Dept: CARDIOLOGY | Facility: CLINIC | Age: 68
End: 2018-04-12
Payer: MEDICARE

## 2018-04-12 VITALS
HEIGHT: 62 IN | WEIGHT: 168 LBS | HEART RATE: 86 BPM | SYSTOLIC BLOOD PRESSURE: 124 MMHG | BODY MASS INDEX: 30.91 KG/M2 | DIASTOLIC BLOOD PRESSURE: 68 MMHG

## 2018-04-12 DIAGNOSIS — E78.5 HYPERLIPIDEMIA LDL GOAL <70: ICD-10-CM

## 2018-04-12 DIAGNOSIS — J96.11 CHRONIC RESPIRATORY FAILURE WITH HYPOXIA: ICD-10-CM

## 2018-04-12 DIAGNOSIS — G47.30 SLEEP-DISORDERED BREATHING: ICD-10-CM

## 2018-04-12 DIAGNOSIS — I21.4 NSTEMI (NON-ST ELEVATED MYOCARDIAL INFARCTION): ICD-10-CM

## 2018-04-12 DIAGNOSIS — R29.898 WEAKNESS OF BOTH LEGS: ICD-10-CM

## 2018-04-12 DIAGNOSIS — E11.42 TYPE 2 DIABETES MELLITUS WITH DIABETIC POLYNEUROPATHY, WITHOUT LONG-TERM CURRENT USE OF INSULIN: ICD-10-CM

## 2018-04-12 DIAGNOSIS — I27.21 PAH (PULMONARY ARTERY HYPERTENSION): ICD-10-CM

## 2018-04-12 DIAGNOSIS — R29.6 FREQUENT FALLS: ICD-10-CM

## 2018-04-12 DIAGNOSIS — I10 ESSENTIAL HYPERTENSION: Chronic | ICD-10-CM

## 2018-04-12 DIAGNOSIS — I25.10 CORONARY ARTERY DISEASE INVOLVING NATIVE CORONARY ARTERY OF NATIVE HEART WITHOUT ANGINA PECTORIS: Primary | Chronic | ICD-10-CM

## 2018-04-12 DIAGNOSIS — J44.9 COPD, SEVERE: ICD-10-CM

## 2018-04-12 DIAGNOSIS — M62.81 MUSCLE WEAKNESS OF UPPER EXTREMITY: ICD-10-CM

## 2018-04-12 PROCEDURE — 99214 OFFICE O/P EST MOD 30 MIN: CPT | Mod: S$PBB,,, | Performed by: INTERNAL MEDICINE

## 2018-04-12 PROCEDURE — 99213 OFFICE O/P EST LOW 20 MIN: CPT | Mod: PBBFAC | Performed by: INTERNAL MEDICINE

## 2018-04-12 PROCEDURE — 99999 PR PBB SHADOW E&M-EST. PATIENT-LVL III: CPT | Mod: PBBFAC,,, | Performed by: INTERNAL MEDICINE

## 2018-04-12 RX ORDER — NITROGLYCERIN 0.4 MG/1
0.4 TABLET SUBLINGUAL EVERY 5 MIN PRN
Qty: 30 TABLET | Refills: 4 | Status: SHIPPED | OUTPATIENT
Start: 2018-04-12 | End: 2019-01-01 | Stop reason: SDUPTHER
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
## 2018-04-13 DIAGNOSIS — E11.9 DIABETES MELLITUS WITHOUT COMPLICATION: ICD-10-CM

## 2018-04-14 DIAGNOSIS — I10 ESSENTIAL HYPERTENSION: ICD-10-CM

## 2018-04-14 DIAGNOSIS — I25.10 CORONARY ARTERY DISEASE INVOLVING NATIVE CORONARY ARTERY WITHOUT ANGINA PECTORIS, UNSPECIFIED WHETHER NATIVE OR TRANSPLANTED HEART: ICD-10-CM

## 2018-04-16 RX ORDER — METOPROLOL TARTRATE 25 MG/1
TABLET, FILM COATED ORAL
Qty: 60 TABLET | Refills: 1 | OUTPATIENT
Start: 2018-04-16

## 2018-04-18 DIAGNOSIS — I25.10 CORONARY ARTERY DISEASE INVOLVING NATIVE CORONARY ARTERY WITHOUT ANGINA PECTORIS, UNSPECIFIED WHETHER NATIVE OR TRANSPLANTED HEART: ICD-10-CM

## 2018-04-18 DIAGNOSIS — I10 ESSENTIAL HYPERTENSION: ICD-10-CM

## 2018-04-18 RX ORDER — METOPROLOL TARTRATE 25 MG/1
TABLET, FILM COATED ORAL
Qty: 60 TABLET | Refills: 1 | OUTPATIENT
Start: 2018-04-18

## 2018-04-19 DIAGNOSIS — I25.10 CORONARY ARTERY DISEASE INVOLVING NATIVE CORONARY ARTERY WITHOUT ANGINA PECTORIS, UNSPECIFIED WHETHER NATIVE OR TRANSPLANTED HEART: ICD-10-CM

## 2018-04-19 DIAGNOSIS — I10 ESSENTIAL HYPERTENSION: ICD-10-CM

## 2018-04-19 RX ORDER — METOPROLOL TARTRATE 25 MG/1
TABLET, FILM COATED ORAL
Qty: 60 TABLET | Refills: 1 | Status: SHIPPED | OUTPATIENT
Start: 2018-04-19 | End: 2018-06-06 | Stop reason: SDUPTHER

## 2018-04-27 DIAGNOSIS — F43.23 SITUATIONAL MIXED ANXIETY AND DEPRESSIVE DISORDER: ICD-10-CM

## 2018-04-27 RX ORDER — LORAZEPAM 0.5 MG/1
0.5 TABLET ORAL DAILY PRN
Qty: 30 TABLET | Refills: 0 | Status: SHIPPED | OUTPATIENT
Start: 2018-04-27 | End: 2018-05-29

## 2018-05-01 ENCOUNTER — OFFICE VISIT (OUTPATIENT)
Dept: FAMILY MEDICINE | Facility: CLINIC | Age: 68
End: 2018-05-01
Payer: MEDICARE

## 2018-05-01 VITALS
HEIGHT: 62 IN | BODY MASS INDEX: 31 KG/M2 | RESPIRATION RATE: 16 BRPM | TEMPERATURE: 98 F | DIASTOLIC BLOOD PRESSURE: 84 MMHG | OXYGEN SATURATION: 93 % | WEIGHT: 168.44 LBS | HEART RATE: 71 BPM | SYSTOLIC BLOOD PRESSURE: 132 MMHG

## 2018-05-01 DIAGNOSIS — R29.6 FREQUENT FALLS: Primary | ICD-10-CM

## 2018-05-01 DIAGNOSIS — R25.1 OCCASIONAL TREMORS: ICD-10-CM

## 2018-05-01 DIAGNOSIS — Z99.81 ON HOME OXYGEN THERAPY: ICD-10-CM

## 2018-05-01 DIAGNOSIS — R26.81 UNSTEADINESS ON FEET: ICD-10-CM

## 2018-05-01 DIAGNOSIS — R41.0 OCCASIONAL CONFUSION: ICD-10-CM

## 2018-05-01 DIAGNOSIS — J44.9 COPD, SEVERE: ICD-10-CM

## 2018-05-01 DIAGNOSIS — E11.42 TYPE 2 DIABETES MELLITUS WITH DIABETIC POLYNEUROPATHY, WITHOUT LONG-TERM CURRENT USE OF INSULIN: ICD-10-CM

## 2018-05-01 DIAGNOSIS — R42 DIZZINESS: ICD-10-CM

## 2018-05-01 DIAGNOSIS — R29.898 WEAKNESS OF BOTH LEGS: ICD-10-CM

## 2018-05-01 PROCEDURE — 99999 PR PBB SHADOW E&M-EST. PATIENT-LVL V: CPT | Mod: PBBFAC,,, | Performed by: NURSE PRACTITIONER

## 2018-05-01 PROCEDURE — 99214 OFFICE O/P EST MOD 30 MIN: CPT | Mod: S$PBB,,, | Performed by: NURSE PRACTITIONER

## 2018-05-01 PROCEDURE — 99215 OFFICE O/P EST HI 40 MIN: CPT | Mod: PBBFAC,PO | Performed by: NURSE PRACTITIONER

## 2018-05-01 NOTE — PROGRESS NOTES
Subjective:       Patient ID: Tiana Bosch is a 67 y.o. female.  She was last seen in primary care by Faisal on 04/10/2018. I last saw her on 02/21/2018.   Chief Complaint: scooter qualify (face to face)    HPI   She is here today for a scooter face-to-face evaluation.  was referred from [facility].   [facility] had PT evaluation for scooter and they told her she qualified but needed a face-to-face evaluation by provider.     Vitals:    05/01/18 1130   BP: 132/84   Pulse: 71   Resp: 16   Temp: 97.8 °F (36.6 °C)     CMP  Sodium   Date Value Ref Range Status   04/03/2018 136 136 - 145 mmol/L Final     Potassium   Date Value Ref Range Status   04/03/2018 5.1 3.5 - 5.1 mmol/L Final     Chloride   Date Value Ref Range Status   04/03/2018 99 95 - 110 mmol/L Final     CO2   Date Value Ref Range Status   04/03/2018 27 23 - 29 mmol/L Final     Glucose   Date Value Ref Range Status   04/03/2018 149 (H) 70 - 110 mg/dL Final     BUN, Bld   Date Value Ref Range Status   04/03/2018 27 (H) 8 - 23 mg/dL Final     Creatinine   Date Value Ref Range Status   04/03/2018 1.2 0.5 - 1.4 mg/dL Final     Calcium   Date Value Ref Range Status   04/03/2018 10.2 8.7 - 10.5 mg/dL Final     Total Protein   Date Value Ref Range Status   04/03/2018 8.3 6.0 - 8.4 g/dL Final     Albumin   Date Value Ref Range Status   04/03/2018 4.2 3.5 - 5.2 g/dL Final     Total Bilirubin   Date Value Ref Range Status   04/03/2018 0.4 0.1 - 1.0 mg/dL Final     Comment:     For infants and newborns, interpretation of results should be based  on gestational age, weight and in agreement with clinical  observations.  Premature Infant recommended reference ranges:  Up to 24 hours.............<8.0 mg/dL  Up to 48 hours............<12.0 mg/dL  3-5 days..................<15.0 mg/dL  6-29 days.................<15.0 mg/dL       Alkaline Phosphatase   Date Value Ref Range Status   04/03/2018 131 55 - 135 U/L Final     AST   Date Value Ref Range Status   04/03/2018  "19 10 - 40 U/L Final     ALT   Date Value Ref Range Status   04/03/2018 21 10 - 44 U/L Final     Anion Gap   Date Value Ref Range Status   04/03/2018 10 8 - 16 mmol/L Final     eGFR if    Date Value Ref Range Status   04/03/2018 54.8 (A) >60 mL/min/1.73 m^2 Final     eGFR if non    Date Value Ref Range Status   04/03/2018 47.5 (A) >60 mL/min/1.73 m^2 Final     Comment:     Calculation used to obtain the estimated glomerular filtration  rate (eGFR) is the CKD-EPI equation.        Review of Systems    She states her last fall was 3 weeks to 1 month ago  She is using cane constantly for safety with ambulation  She states walks 20-30 feet and then experiences leg pain and shortness of breath.   states she has a walker at home but does not use it at home because feels dizzy.    has list and states she has dizziness which is documented in visit to audiology in 12/18/2017 with no abnormal findings.  states she fell out of bed and awakens dizzy.    states some short term memory loss   states she "gets the shakes once in a while"  States she awakens slightly confused at times  She states was tested for sleep apnea and was negative  She does wear an oxygen at bedtime at 2 liters  Objective:      Physical Exam   Constitutional: She is oriented to person, place, and time. Vital signs are normal. She appears well-developed and well-nourished. She is active and cooperative. She has a sickly appearance.   HENT:   Head: Normocephalic and atraumatic.   Right Ear: Hearing and external ear normal.   Left Ear: Hearing and external ear normal.   Nose: Nose normal.   Mouth/Throat: Oropharynx is clear and moist.   Eyes: Lids are normal.   Neck: Trachea normal, normal range of motion, full passive range of motion without pain and phonation normal. Neck supple.   Cardiovascular: Normal rate and regular rhythm.    Pulmonary/Chest: Effort normal and breath sounds normal. "   Abdominal: Soft.   Musculoskeletal: She exhibits tenderness.   Gait slightly slow and she appears to be very deliberate about her movements. Using straight cane to assist with ambulation.   Lymphadenopathy:        Head (right side): No submental, no submandibular, no tonsillar, no preauricular, no posterior auricular and no occipital adenopathy present.        Head (left side): No submental, no submandibular, no tonsillar, no preauricular, no posterior auricular and no occipital adenopathy present.     She has no cervical adenopathy.   Neurological: She is alert and oriented to person, place, and time.   Skin: Skin is warm, dry and intact.   Psychiatric: She has a normal mood and affect. Her speech is normal and behavior is normal. Judgment and thought content normal. Cognition and memory are impaired.   Nursing note and vitals reviewed.      Assessment & Plan:       Frequent falls  -     Vitamin B12; Future; Expected date: 05/01/2018    Weakness of both legs  -     Vitamin B12; Future; Expected date: 05/01/2018    Dizziness  -     Ambulatory referral to Neurology  -     Vitamin B12; Future; Expected date: 05/01/2018    Unsteadiness on feet   -     Vitamin B12; Future; Expected date: 05/01/2018    Occasional tremors  -     Ambulatory referral to Neurology  -     Vitamin B12; Future; Expected date: 05/01/2018    Type 2 diabetes mellitus with diabetic polyneuropathy, without long-term current use of insulin  -     Vitamin B12; Future; Expected date: 05/01/2018    COPD, severe  -     Vitamin B12; Future; Expected date: 05/01/2018    On home oxygen therapy    Occasional confusion  -     Ambulatory referral to Neurology  -     Vitamin B12; Future; Expected date: 05/01/2018          Follow-up in about 4 weeks (around 5/29/2018), or if symptoms worsen or fail to improve.

## 2018-05-11 ENCOUNTER — PATIENT MESSAGE (OUTPATIENT)
Dept: FAMILY MEDICINE | Facility: CLINIC | Age: 68
End: 2018-05-11

## 2018-05-23 ENCOUNTER — PATIENT MESSAGE (OUTPATIENT)
Dept: FAMILY MEDICINE | Facility: CLINIC | Age: 68
End: 2018-05-23

## 2018-05-29 ENCOUNTER — OFFICE VISIT (OUTPATIENT)
Dept: FAMILY MEDICINE | Facility: CLINIC | Age: 68
End: 2018-05-29
Payer: MEDICARE

## 2018-05-29 VITALS
WEIGHT: 169.31 LBS | TEMPERATURE: 98 F | BODY MASS INDEX: 31.16 KG/M2 | OXYGEN SATURATION: 92 % | DIASTOLIC BLOOD PRESSURE: 70 MMHG | SYSTOLIC BLOOD PRESSURE: 116 MMHG | HEIGHT: 62 IN | HEART RATE: 84 BPM

## 2018-05-29 DIAGNOSIS — E11.42 TYPE 2 DIABETES MELLITUS WITH DIABETIC POLYNEUROPATHY, WITHOUT LONG-TERM CURRENT USE OF INSULIN: ICD-10-CM

## 2018-05-29 DIAGNOSIS — R79.81 BORDERLINE LOW OXYGEN SATURATION LEVEL: ICD-10-CM

## 2018-05-29 DIAGNOSIS — Z99.81 ON HOME OXYGEN THERAPY: ICD-10-CM

## 2018-05-29 DIAGNOSIS — I10 ESSENTIAL HYPERTENSION: Chronic | ICD-10-CM

## 2018-05-29 DIAGNOSIS — J44.9 COPD, SEVERE: Primary | ICD-10-CM

## 2018-05-29 PROCEDURE — 99215 OFFICE O/P EST HI 40 MIN: CPT | Mod: PBBFAC,PO | Performed by: NURSE PRACTITIONER

## 2018-05-29 PROCEDURE — 99999 PR PBB SHADOW E&M-EST. PATIENT-LVL V: CPT | Mod: PBBFAC,,, | Performed by: NURSE PRACTITIONER

## 2018-05-29 PROCEDURE — 99213 OFFICE O/P EST LOW 20 MIN: CPT | Mod: S$PBB,,, | Performed by: NURSE PRACTITIONER

## 2018-05-29 NOTE — PROGRESS NOTES
Subjective:       Patient ID: Tiana Bosch is a 67 y.o. female.    Chief Complaint: Diabetes and Diabetic Foot Exam  She was last seen in primary care by me on 05/01/2018. She last saw Faisal on 04/10/2018. She is accompanied by her .  HPI   She is here for diabetes foot exam.   Vitals:    05/29/18 0739   BP: 116/70   Pulse: 84   Temp: 98.2 °F (36.8 °C)     BP Readings from Last 3 Encounters:   05/29/18 116/70   05/01/18 132/84   04/12/18 124/68   SPO2@ 92% today  Last saw pulmonary 3-4 months    Lab Results   Component Value Date    HGBA1C 7.8 (H) 04/03/2018     Lab Results   Component Value Date    WBC 8.41 04/03/2018    HGB 13.2 04/03/2018    HCT 41.4 04/03/2018    MCV 97 04/03/2018     04/03/2018     CMP  Sodium   Date Value Ref Range Status   04/03/2018 136 136 - 145 mmol/L Final     Potassium   Date Value Ref Range Status   04/03/2018 5.1 3.5 - 5.1 mmol/L Final     Chloride   Date Value Ref Range Status   04/03/2018 99 95 - 110 mmol/L Final     CO2   Date Value Ref Range Status   04/03/2018 27 23 - 29 mmol/L Final     Glucose   Date Value Ref Range Status   04/03/2018 149 (H) 70 - 110 mg/dL Final     BUN, Bld   Date Value Ref Range Status   04/03/2018 27 (H) 8 - 23 mg/dL Final     Creatinine   Date Value Ref Range Status   04/03/2018 1.2 0.5 - 1.4 mg/dL Final     Calcium   Date Value Ref Range Status   04/03/2018 10.2 8.7 - 10.5 mg/dL Final     Total Protein   Date Value Ref Range Status   04/03/2018 8.3 6.0 - 8.4 g/dL Final     Albumin   Date Value Ref Range Status   04/03/2018 4.2 3.5 - 5.2 g/dL Final     Total Bilirubin   Date Value Ref Range Status   04/03/2018 0.4 0.1 - 1.0 mg/dL Final     Comment:     For infants and newborns, interpretation of results should be based  on gestational age, weight and in agreement with clinical  observations.  Premature Infant recommended reference ranges:  Up to 24 hours.............<8.0 mg/dL  Up to 48 hours............<12.0 mg/dL  3-5  days..................<15.0 mg/dL  6-29 days.................<15.0 mg/dL       Alkaline Phosphatase   Date Value Ref Range Status   04/03/2018 131 55 - 135 U/L Final     AST   Date Value Ref Range Status   04/03/2018 19 10 - 40 U/L Final     ALT   Date Value Ref Range Status   04/03/2018 21 10 - 44 U/L Final     Anion Gap   Date Value Ref Range Status   04/03/2018 10 8 - 16 mmol/L Final     eGFR if    Date Value Ref Range Status   04/03/2018 54.8 (A) >60 mL/min/1.73 m^2 Final     eGFR if non    Date Value Ref Range Status   04/03/2018 47.5 (A) >60 mL/min/1.73 m^2 Final     Comment:     Calculation used to obtain the estimated glomerular filtration  rate (eGFR) is the CKD-EPI equation.        Review of Systems   Constitutional: Positive for fatigue.   All other systems reviewed and are negative.      Objective:      Physical Exam   Constitutional: She is oriented to person, place, and time. Vital signs are normal. She appears well-developed and well-nourished. She is cooperative.   HENT:   Head: Normocephalic and atraumatic.   Right Ear: Hearing, tympanic membrane, external ear and ear canal normal.   Left Ear: Hearing, tympanic membrane, external ear and ear canal normal.   Nose: Nose normal.   Mouth/Throat: Uvula is midline, oropharynx is clear and moist and mucous membranes are normal.   Eyes: Conjunctivae and lids are normal.   Cardiovascular: Normal rate and regular rhythm.    Pulses:       Dorsalis pedis pulses are 1+ on the right side, and 1+ on the left side.        Posterior tibial pulses are 1+ on the right side, and 1+ on the left side.   Pulmonary/Chest: Effort normal. She has decreased breath sounds.   Slightly diminshed   Abdominal: Soft. Bowel sounds are normal. There is no tenderness.   Musculoskeletal: Normal range of motion.   Feet:   Right Foot:   Protective Sensation: 10 sites tested. 8 sites sensed.   Left Foot:   Protective Sensation: 10 sites tested. 10 sites  sensed.   Lymphadenopathy:        Head (right side): No submental, no submandibular, no tonsillar, no preauricular, no posterior auricular and no occipital adenopathy present.        Head (left side): No submental, no submandibular, no tonsillar, no preauricular, no posterior auricular and no occipital adenopathy present.     She has cervical adenopathy.   Neurological: She is alert and oriented to person, place, and time.   Skin: Skin is warm, dry and intact. Capillary refill takes less than 2 seconds. No cyanosis. Nails show no clubbing.   Psychiatric: She has a normal mood and affect. Her speech is normal and behavior is normal. Judgment and thought content normal. Cognition and memory are normal.   Nursing note and vitals reviewed.      Assessment & Plan:       COPD, severe    Type 2 diabetes mellitus with diabetic polyneuropathy, without long-term current use of insulin    On home oxygen therapy    Essential hypertension    Borderline low oxygen saturation level    Continue current medications  I have encouraged her to speak with pulmonary and inform that her SPO2 is running in low 90's during the daytime. She states she does have oxygen she uses sometimes during the day but primarily at night. I have encouraged her to use it during the daytime if her oxygen levels are 92 as it is today.       Medication List with Changes/Refills   Current Medications    ASPIRIN (ECOTRIN) 81 MG EC TABLET    Take 1 tablet (81 mg total) by mouth once daily.    BLOOD SUGAR DIAGNOSTIC (ONETOUCH ULTRA TEST) STRP    TEST ONE TIME AMAYA.    CALCIUM CARBONATE (OS-RENALDO) 500 MG CALCIUM (1,250 MG) TABLET    Take 1 tablet by mouth once daily.    CYCLOBENZAPRINE (FLEXERIL) 10 MG TABLET    TAKE 1 TABLET BY MOUTH 3 TIMES DAILY    FEXOFENADINE 30 MG TBDL    Take 30 mg by mouth once daily.    FLUOCINOLONE ACETONIDE OIL 0.01 % DROP    INSTILL THREE DROPS INTO AFFECTED EAR TWICE DAILY     FLUTICASONE (FLONASE) 50 MCG/ACTUATION NASAL SPRAY    1  spray (50 mcg total) by Each Nare route once daily.    GABAPENTIN (NEURONTIN) 400 MG CAPSULE    Take two capsules in the morning and afternoon and three capsules at night.    GLIPIZIDE (GLUCOTROL) 5 MG TABLET    Take 1 tablet (5 mg total) by mouth 2 (two) times daily before meals.    IPRATROPIUM (ATROVENT) 0.06 % NASAL SPRAY    2 sprays by Nasal route 4 (four) times daily. As needed for rhinitis    LANCETS (ONETOUCH DELICA LANCETS) 33 GAUGE MISC    1 lancet by Misc.(Non-Drug; Combo Route) route Daily. Use to    LISINOPRIL (PRINIVIL,ZESTRIL) 5 MG TABLET    Take 1 tablet (5 mg total) by mouth once daily.    METFORMIN (GLUCOPHAGE-XR) 500 MG 24 HR TABLET    TAKE 1 TABLET (500 MG TOTAL) BY MOUTH 2 (TWO) TIMES DAILY WITH MEALS.    METOPROLOL TARTRATE (LOPRESSOR) 25 MG TABLET    TAKE 1 TABLET BY MOUTH TWICE A DAY    MOMETASONE-FORMOTEROL (DULERA) 100-5 MCG/ACTUATION HFAA    Inhale 2 puffs into the lungs once daily. Controller    MONTELUKAST (SINGULAIR) 10 MG TABLET    Take 1 tablet (10 mg total) by mouth every evening.    NITROGLYCERIN (NITROSTAT) 0.4 MG SL TABLET    Place 1 tablet (0.4 mg total) under the tongue every 5 (five) minutes as needed for Chest pain.    TRAMADOL (ULTRAM) 50 MG TABLET    Take 2 tablets (100 mg total) by mouth every 8 (eight) hours as needed.   Discontinued Medications    LORAZEPAM (ATIVAN) 0.5 MG TABLET    Take 1 tablet (0.5 mg total) by mouth daily as needed for Anxiety.     No Follow-up on file.

## 2018-05-31 ENCOUNTER — PATIENT MESSAGE (OUTPATIENT)
Dept: FAMILY MEDICINE | Facility: CLINIC | Age: 68
End: 2018-05-31

## 2018-06-04 RX ORDER — METFORMIN HYDROCHLORIDE 500 MG/1
TABLET, EXTENDED RELEASE ORAL
Qty: 180 TABLET | Refills: 0 | Status: SHIPPED | OUTPATIENT
Start: 2018-06-04 | End: 2018-09-02 | Stop reason: SDUPTHER

## 2018-06-04 NOTE — PROGRESS NOTES
Refill Authorization Note     is requesting a refill authorization.    Brief assessment and rationale for refill: APPROVE: needs labs; dose adjustment question  Amount/Quantity of medication ordered: 90d         Refills Authorized: Yes  If authorized number of refills: 0        Medication-related problems identified:   Requires labs  Dose adjustment  Adverse drug effects  Medication Therapy Plan: Needs new a1c; uncontrolled; hx of frequent falls however; do you think related to BG?  Would try to get her metformin at least to 1500 mg if possible   Name and strength of medication: METFORMIN  MG TABLET  How patient will take medication: t1t po BID   Medication reconciliation completed: No  Comments:   Lab Results   Component Value Date    HGBA1C 7.8 (H) 04/03/2018      Lab Results   Component Value Date    CREATININE 1.2 04/03/2018    BUN 27 (H) 04/03/2018     04/03/2018    K 5.1 04/03/2018    CL 99 04/03/2018    CO2 27 04/03/2018

## 2018-06-05 ENCOUNTER — PATIENT MESSAGE (OUTPATIENT)
Dept: FAMILY MEDICINE | Facility: CLINIC | Age: 68
End: 2018-06-05

## 2018-06-06 DIAGNOSIS — I25.10 CORONARY ARTERY DISEASE INVOLVING NATIVE CORONARY ARTERY WITHOUT ANGINA PECTORIS, UNSPECIFIED WHETHER NATIVE OR TRANSPLANTED HEART: ICD-10-CM

## 2018-06-06 DIAGNOSIS — I10 ESSENTIAL HYPERTENSION: ICD-10-CM

## 2018-06-08 RX ORDER — METOPROLOL TARTRATE 25 MG/1
25 TABLET, FILM COATED ORAL 2 TIMES DAILY
Qty: 180 TABLET | Refills: 1 | Status: SHIPPED | OUTPATIENT
Start: 2018-06-08 | End: 2018-11-13 | Stop reason: SDUPTHER

## 2018-06-10 ENCOUNTER — OFFICE VISIT (OUTPATIENT)
Dept: URGENT CARE | Facility: CLINIC | Age: 68
End: 2018-06-10
Payer: MEDICARE

## 2018-06-10 VITALS
WEIGHT: 169 LBS | TEMPERATURE: 101 F | DIASTOLIC BLOOD PRESSURE: 84 MMHG | OXYGEN SATURATION: 88 % | BODY MASS INDEX: 31.1 KG/M2 | HEART RATE: 100 BPM | HEIGHT: 62 IN | SYSTOLIC BLOOD PRESSURE: 150 MMHG

## 2018-06-10 DIAGNOSIS — R05.9 COUGH: Primary | ICD-10-CM

## 2018-06-10 DIAGNOSIS — J18.9 PNEUMONIA OF LEFT LOWER LOBE DUE TO INFECTIOUS ORGANISM: ICD-10-CM

## 2018-06-10 PROBLEM — J44.1 COPD EXACERBATION: Status: ACTIVE | Noted: 2018-06-10

## 2018-06-10 PROCEDURE — 99214 OFFICE O/P EST MOD 30 MIN: CPT | Mod: S$GLB,,, | Performed by: PHYSICIAN ASSISTANT

## 2018-06-10 NOTE — PATIENT INSTRUCTIONS
Treating Pneumonia  Pneumonia is an infection of one or both of the lungs. Pneumonia:  · Is usually caused by either a virus or a bacteria  · Can be very serious, especially in infants, young children, and older adults. Its also serious for those with other long-term health problems or weakened immune systems.  · Is sometimes treated at home and sometimes in the hospital    Antibiotic medicines  Antibiotics may be prescribed for pneumonia caused by bacteria. They may be pills (oral medicines), or shots (injections). Or they may be given by IV (intravenously) into a vein. If you are taking oral medicines at home:  · Fill your prescription and start taking your medicine as soon as you can.  · You will likely start to feel better in a day or 2, but dont stop taking the antibiotic.  · Use a pill organizer to help you remember to take your medicine.  · Let your healthcare provider know if you have side effects.  · Take your medicine exactly as directed on the label. Talk to your provider or pharmacist if you have any questions.  Antiviral medicines  Antiviral medicine may be prescribed for pneumonia caused by a virus. For example, antiviral medicine may be prescribed for pneumonia caused by the flu virus. Antibiotics do not work against viruses. If you are taking antiviral medicine at home:  · Fill your prescription and start taking your medicine as soon as you can.  · Talk with your provider or pharmacist about possible side effects.  · Take the medicine exactly as instructed.  To relieve symptoms  There are many medicines that can help relieve symptoms of pneumonia. Some are prescription and some are over-the-counter.  Your healthcare provider may recommend:  · Acetaminophen or ibuprofen to lower your fever and to lessen headache or other pain  · Cough medicine to loosen mucus or to reduce coughing  Make sure you check with your healthcare provider or pharmacist before taking any over-the-counter  medicines.  Special treatments  If you are hospitalized for pneumonia, you may have other therapies, including:  · Inhaled medicines to help with breathing or chest congestion  · Supplemental oxygen to increase low oxygen levels  Drink fluids and eat healthy  You should eat healthy to help your body fight the infection. Drinking a lot of fluids helps to replace fluids lost from fever and to loosen mucus in your chest.  · Diet. Make healthy food choices, including fruits and vegetables, lean meats and other proteins, 100% whole grain and low- or no-fat dairy products.  · Fluids. Drink at least 6 to 8 tall glasses a day. Water and 100% fruit or vegetable juice are best.  Get plenty of rest and sleep  You may be more tired than usual for a while. It is important to get enough sleep at night. Its also important to rest during the day. Talk with your healthcare provider if coughing or other symptoms are interfering with your sleep.  Preventing the spread of germs  The best thing you can do to prevent spreading germs is to wash your hands often. You should:  · Rub your hand with soap and water for 20 to 30 seconds.  · Clean in between your fingers, the backs of your hands, and around your nails.  · Dry your hands on a separate towel or use paper towels.  You should also:  · Keep alcohol-based hand  nearby.  · Make sure you also clean surfaces that you touch. Use a product that kills all types of germs.  · Stay away from others until you are feeling better.  When to call your healthcare provider  Call your healthcare provider if you have any of the following:  · Symptoms get worse  · Fever continues  · Shortness of breath gets worse  · Increased mucus or mucus that is darker in color  · Coughing gets worse  · Lips or fingers are bluish in color  · Side effects from your medicine   Date Last Reviewed: 12/1/2016  © 6337-1812 [employer] [address] [place] [address], [place] [address]. All rights reserved.  This information is not intended as a substitute for professional medical care. Always follow your healthcare professional's instructions.

## 2018-06-10 NOTE — PROGRESS NOTES
"Subjective:       Patient ID: Tiana Bosch is a 67 y.o. female.    Vitals:  height is 5' 2" (1.575 m) and weight is 76.7 kg (169 lb). Her tympanic temperature is 100.6 °F (38.1 °C) (abnormal). Her blood pressure is 150/84 (abnormal) and her pulse is 100. Her oxygen saturation is 88% (abnormal).     Chief Complaint: Cough    Cough   This is a new problem. The current episode started in the past 7 days. The problem has been gradually worsening. The problem occurs every few minutes. Associated symptoms include headaches and shortness of breath. Pertinent negatives include no chest pain, chills, ear pain, eye redness, fever, myalgias, nasal congestion, sore throat or wheezing. Treatments tried: nasal spray, inhaler. Her past medical history is significant for asthma, bronchitis and COPD. There is no history of pneumonia.     Review of Systems   Constitution: Negative for chills, fever and malaise/fatigue.   HENT: Positive for congestion. Negative for ear pain, hoarse voice and sore throat.    Eyes: Negative for discharge and redness.   Cardiovascular: Negative for chest pain, dyspnea on exertion and leg swelling.   Respiratory: Positive for cough and shortness of breath. Negative for sputum production and wheezing.    Musculoskeletal: Negative for myalgias.   Gastrointestinal: Negative for abdominal pain and nausea.   Neurological: Positive for headaches.       Objective:      Physical Exam   Constitutional: She is oriented to person, place, and time. She appears well-developed and well-nourished. She is cooperative.  Non-toxic appearance. She appears ill. No distress.   HENT:   Head: Normocephalic and atraumatic.   Right Ear: Hearing, tympanic membrane, external ear and ear canal normal.   Left Ear: Hearing, tympanic membrane, external ear and ear canal normal.   Nose: Nose normal. No mucosal edema, rhinorrhea or nasal deformity. No epistaxis. Right sinus exhibits no maxillary sinus tenderness and no frontal sinus " tenderness. Left sinus exhibits no maxillary sinus tenderness and no frontal sinus tenderness.   Mouth/Throat: Uvula is midline and mucous membranes are normal. No trismus in the jaw. Normal dentition. No uvula swelling. Posterior oropharyngeal erythema present.   Eyes: Conjunctivae and lids are normal. No scleral icterus.   Sclera clear bilat   Neck: Trachea normal, full passive range of motion without pain and phonation normal. Neck supple.   Cardiovascular: Normal rate, regular rhythm, normal heart sounds, intact distal pulses and normal pulses.    Pulmonary/Chest: Effort normal. No respiratory distress. She has decreased breath sounds in the left upper field, the left middle field and the left lower field.   Abdominal: Soft. Normal appearance and bowel sounds are normal. She exhibits no distension. There is no tenderness.   Musculoskeletal: Normal range of motion. She exhibits no edema or deformity.   Neurological: She is alert and oriented to person, place, and time. She exhibits normal muscle tone. Coordination normal.   Skin: Skin is warm, dry and intact. She is not diaphoretic. No pallor.   Psychiatric: She has a normal mood and affect. Her speech is normal and behavior is normal. Judgment and thought content normal. Cognition and memory are normal.   Nursing note and vitals reviewed.      Assessment:       1. Cough    2. Pneumonia of left lower lobe due to infectious organism        Plan:         Cough  -     X-Ray Chest PA And Lateral; Future; Expected date: 06/10/2018    Pneumonia of left lower lobe due to infectious organism  -     Refer to Emergency Dept.  -     Refer to Emergency Dept.      67 year old female with history COPD and type 2 DM. O2 sat 88%, fever 100.6. Patient appears ill. Discussed with Dr. Cook who agrees patient should proceed to ED. [facility] protocol followed. [facility] ([facility] called and given report. Patient is with family member and they will self transport.

## 2018-06-12 ENCOUNTER — PATIENT MESSAGE (OUTPATIENT)
Dept: FAMILY MEDICINE | Facility: CLINIC | Age: 68
End: 2018-06-12

## 2018-06-12 ENCOUNTER — TELEPHONE (OUTPATIENT)
Dept: FAMILY MEDICINE | Facility: CLINIC | Age: 68
End: 2018-06-12

## 2018-06-12 NOTE — TELEPHONE ENCOUNTER
Patient is requesting alternative to Lorazepam at the recommendation of pain management.     Please advise.

## 2018-06-12 NOTE — TELEPHONE ENCOUNTER
----- Message from Tameka Le sent at 6/12/2018 10:45 AM CDT -----  Contact: PT  Type: Needs Medical Advice    Who Called:  Tiana   Symptoms (please be specific):  n/a  How long has patient had these symptoms:  n/a  Pharmacy name and phone #:  n/a  Best Call Back Number:    Additional Information:  Pt is requesting to speak with nurse regarding a hospital follow up she is going to need to schedule. She has a few questions and concerns. Please call back and advise.

## 2018-06-13 ENCOUNTER — TELEPHONE (OUTPATIENT)
Dept: URGENT CARE | Facility: CLINIC | Age: 68
End: 2018-06-13

## 2018-06-15 RX ORDER — LISINOPRIL 5 MG/1
5 TABLET ORAL DAILY
Qty: 90 TABLET | Refills: 3 | Status: SHIPPED | OUTPATIENT
Start: 2018-06-15 | End: 2019-01-01

## 2018-06-20 ENCOUNTER — PATIENT MESSAGE (OUTPATIENT)
Dept: FAMILY MEDICINE | Facility: CLINIC | Age: 68
End: 2018-06-20

## 2018-06-20 ENCOUNTER — OFFICE VISIT (OUTPATIENT)
Dept: FAMILY MEDICINE | Facility: CLINIC | Age: 68
End: 2018-06-20
Payer: MEDICARE

## 2018-06-20 VITALS
DIASTOLIC BLOOD PRESSURE: 66 MMHG | BODY MASS INDEX: 30.02 KG/M2 | OXYGEN SATURATION: 91 % | TEMPERATURE: 98 F | WEIGHT: 163.13 LBS | HEIGHT: 62 IN | HEART RATE: 82 BPM | SYSTOLIC BLOOD PRESSURE: 110 MMHG

## 2018-06-20 DIAGNOSIS — Z99.81 ON HOME OXYGEN THERAPY: ICD-10-CM

## 2018-06-20 DIAGNOSIS — J44.1 COPD EXACERBATION: Primary | ICD-10-CM

## 2018-06-20 DIAGNOSIS — R45.89 ANXIETY ABOUT HEALTH: ICD-10-CM

## 2018-06-20 DIAGNOSIS — R06.02 SHORTNESS OF BREATH: ICD-10-CM

## 2018-06-20 DIAGNOSIS — R29.898 WEAKNESS OF BOTH LEGS: ICD-10-CM

## 2018-06-20 PROCEDURE — 99215 OFFICE O/P EST HI 40 MIN: CPT | Mod: PBBFAC,PO | Performed by: NURSE PRACTITIONER

## 2018-06-20 PROCEDURE — 99999 PR PBB SHADOW E&M-EST. PATIENT-LVL V: CPT | Mod: PBBFAC,,, | Performed by: NURSE PRACTITIONER

## 2018-06-20 PROCEDURE — 99214 OFFICE O/P EST MOD 30 MIN: CPT | Mod: S$PBB,,, | Performed by: NURSE PRACTITIONER

## 2018-06-20 RX ORDER — OMEPRAZOLE 20 MG/1
20 CAPSULE, DELAYED RELEASE ORAL DAILY
Status: ON HOLD | COMMUNITY
End: 2018-10-05 | Stop reason: HOSPADM

## 2018-06-20 RX ORDER — BUSPIRONE HYDROCHLORIDE 7.5 MG/1
7.5 TABLET ORAL 2 TIMES DAILY PRN
Qty: 60 TABLET | Refills: 2 | Status: SHIPPED | OUTPATIENT
Start: 2018-06-20 | End: 2018-07-23

## 2018-06-20 NOTE — PROGRESS NOTES
Subjective:       Patient ID: Tiana Bosch is a 67 y.o. female.    Chief Complaint: COPD and Hospital Follow Up  She was last seen in primary care by me on 05/29/2018. She last saw  Faisal on 04/10/2018. She is accompanied by her .   HPI   She was admitted to [facility] on 06/10/2018-06/11/2018 for exacerbation of COPD. She is here to follow up from the overnight stay.  She has some shortness of breath with walking from the front to the clinic room. However, she states her shortness of breath and feeling of not being able to catch breath has improved since completing steroids and antibiotics.    Vitals:    06/20/18 0834   BP: 110/66   Pulse: 82   Temp: 98.1 °F (36.7 °C)     SPO2@ 91% She does use home oxygen  She does have a portable oxygen concentrated  Review of Systems    She states she has finished her antibiotics and steroids post hospital  No home health needed  No new home devices needed post hospital  Lab Results   Component Value Date    HGBA1C 7.2 (H) 06/10/2018     Objective:      Physical Exam   Constitutional: She is oriented to person, place, and time. Vital signs are normal. She appears well-developed and well-nourished. She is active and cooperative.   HENT:   Head: Normocephalic and atraumatic.   Right Ear: Hearing, tympanic membrane, external ear and ear canal normal.   Left Ear: Hearing, tympanic membrane, external ear and ear canal normal.   Nose: Nose normal.   Mouth/Throat: Uvula is midline, oropharynx is clear and moist and mucous membranes are normal.   Eyes: Lids are normal.   Neck: Trachea normal, normal range of motion, full passive range of motion without pain and phonation normal. Neck supple.   Cardiovascular: Normal rate, regular rhythm and normal heart sounds.    Pulmonary/Chest: Effort normal and breath sounds normal.   Shortness of breath   Decreased oxygen saturation   Abdominal: Soft. There is no tenderness.   Musculoskeletal: Normal range of motion.   Lymphadenopathy:         Head (right side): No submental, no submandibular, no tonsillar, no preauricular, no posterior auricular and no occipital adenopathy present.        Head (left side): No submental, no submandibular, no tonsillar, no preauricular, no posterior auricular and no occipital adenopathy present.     She has no cervical adenopathy.   Neurological: She is alert and oriented to person, place, and time.   Skin: Skin is warm and dry.   Psychiatric: She has a normal mood and affect. Her speech is normal and behavior is normal. Judgment and thought content normal. Cognition and memory are normal.   Nursing note and vitals reviewed.      Assessment & Plan:       COPD exacerbation- Stable, continue Dulera, followed by pulmonology    On home oxygen therapy- Use oxygen when out of the home, continue Dulera    Shortness of breath-Use oxygen when out of home, continue Dulera    Anxiety about health  -     busPIRone (BUSPAR) 7.5 MG tablet; Take 1 tablet (7.5 mg total) by mouth 2 (two) times daily as needed.  Dispense: 60 tablet; Refill: 2    Weakness of both legs    I have encouraged follow up with her pulmonologist for further assessment if she has any worsening of her shortness of breath.   I have instructed her and spouse to use her portal oxygen when she leaves home because her oxygen saturation is at 91% during this visit and she does not have her oxygen with her. They both verbalized understanding and that they would use oxygen when she is out of the home.      No changes to chronic medications upon discharge from hospital. However,she would like to try a different medication for anxiety because she states pain management provider prefers she uses another medication instead of lorazepam.     Medication List with Changes/Refills   New Medications    BUSPIRONE (BUSPAR) 7.5 MG TABLET    Take 1 tablet (7.5 mg total) by mouth 2 (two) times daily as needed.   Current Medications    ALBUTEROL INHL    Inhale into the lungs as needed.     ASPIRIN (ECOTRIN) 81 MG EC TABLET    Take 1 tablet (81 mg total) by mouth once daily.    BLOOD SUGAR DIAGNOSTIC (ONETOUCH ULTRA TEST) STRP    TEST ONE TIME AMAYA.    CALCIUM CARBONATE (OS-RENALDO) 500 MG CALCIUM (1,250 MG) TABLET    Take 1 tablet by mouth once daily.    CYCLOBENZAPRINE (FLEXERIL) 10 MG TABLET    TAKE 1 TABLET BY MOUTH 3 TIMES DAILY    FEXOFENADINE 30 MG TBDL    Take 30 mg by mouth once daily.    FLUOCINOLONE ACETONIDE OIL 0.01 % DROP    INSTILL THREE DROPS INTO AFFECTED EAR TWICE DAILY     FLUTICASONE (FLONASE) 50 MCG/ACTUATION NASAL SPRAY    1 spray (50 mcg total) by Each Nare route once daily.    GABAPENTIN (NEURONTIN) 400 MG CAPSULE    Take two capsules in the morning and afternoon and three capsules at night.    GLIPIZIDE (GLUCOTROL) 5 MG TABLET    Take 1 tablet (5 mg total) by mouth 2 (two) times daily before meals.    IPRATROPIUM (ATROVENT) 0.06 % NASAL SPRAY    2 sprays by Nasal route 4 (four) times daily. As needed for rhinitis    L.ACIDOPHIL,PARAC-S.THERM-BIF. (RISAQUAD) CAP CAPSULE    Take 1 capsule by mouth once daily.    LANCETS (ONETOUCH DELICA LANCETS) 33 GAUGE MISC    1 lancet by Misc.(Non-Drug; Combo Route) route Daily. Use to    LISINOPRIL (PRINIVIL,ZESTRIL) 5 MG TABLET    Take 1 tablet (5 mg total) by mouth once daily.    METFORMIN (GLUCOPHAGE-XR) 500 MG 24 HR TABLET    TAKE 1 TABLET BY MOUTH TWICE DAILY WITH MEALS    METOPROLOL TARTRATE (LOPRESSOR) 25 MG TABLET    Take 1 tablet (25 mg total) by mouth 2 (two) times daily.    MOMETASONE-FORMOTEROL (DULERA) 100-5 MCG/ACTUATION HFAA    Inhale 2 puffs into the lungs once daily. Controller    MONTELUKAST (SINGULAIR) 10 MG TABLET    Take 1 tablet (10 mg total) by mouth every evening.    NITROGLYCERIN (NITROSTAT) 0.4 MG SL TABLET    Place 1 tablet (0.4 mg total) under the tongue every 5 (five) minutes as needed for Chest pain.    OMEPRAZOLE (PRILOSEC) 20 MG CAPSULE    Take 20 mg by mouth once daily.    TRAMADOL (ULTRAM) 50 MG TABLET    Take 2  tablets (100 mg total) by mouth every 8 (eight) hours as needed.     Follow-up if symptoms worsen or fail to improve.

## 2018-06-20 NOTE — PATIENT INSTRUCTIONS
Please bring your   Make deliberate slow movements when getting out of bed or getting up from steady

## 2018-06-26 ENCOUNTER — PATIENT MESSAGE (OUTPATIENT)
Dept: FAMILY MEDICINE | Facility: CLINIC | Age: 68
End: 2018-06-26

## 2018-06-26 DIAGNOSIS — I25.10 CORONARY ARTERY DISEASE INVOLVING NATIVE CORONARY ARTERY OF NATIVE HEART WITHOUT ANGINA PECTORIS: Primary | Chronic | ICD-10-CM

## 2018-07-09 ENCOUNTER — PATIENT OUTREACH (OUTPATIENT)
Dept: ADMINISTRATIVE | Facility: HOSPITAL | Age: 68
End: 2018-07-09

## 2018-07-11 ENCOUNTER — OFFICE VISIT (OUTPATIENT)
Dept: FAMILY MEDICINE | Facility: CLINIC | Age: 68
End: 2018-07-11
Payer: MEDICARE

## 2018-07-11 VITALS
OXYGEN SATURATION: 91 % | WEIGHT: 167.31 LBS | SYSTOLIC BLOOD PRESSURE: 120 MMHG | TEMPERATURE: 98 F | HEIGHT: 62 IN | BODY MASS INDEX: 30.79 KG/M2 | HEART RATE: 85 BPM | DIASTOLIC BLOOD PRESSURE: 70 MMHG

## 2018-07-11 DIAGNOSIS — R29.898 WEAKNESS OF BOTH LEGS: ICD-10-CM

## 2018-07-11 DIAGNOSIS — R29.6 FREQUENT FALLS: ICD-10-CM

## 2018-07-11 DIAGNOSIS — M79.7 FIBROMYALGIA: Chronic | ICD-10-CM

## 2018-07-11 DIAGNOSIS — J44.9 COPD, SEVERE: ICD-10-CM

## 2018-07-11 DIAGNOSIS — Z99.81 ON HOME OXYGEN THERAPY: ICD-10-CM

## 2018-07-11 DIAGNOSIS — M62.81 MUSCLE WEAKNESS OF UPPER EXTREMITY: Primary | ICD-10-CM

## 2018-07-11 PROCEDURE — 99215 OFFICE O/P EST HI 40 MIN: CPT | Mod: PBBFAC,PO | Performed by: NURSE PRACTITIONER

## 2018-07-11 PROCEDURE — 99999 PR PBB SHADOW E&M-EST. PATIENT-LVL V: CPT | Mod: PBBFAC,,, | Performed by: NURSE PRACTITIONER

## 2018-07-11 PROCEDURE — 99213 OFFICE O/P EST LOW 20 MIN: CPT | Mod: S$PBB,,, | Performed by: NURSE PRACTITIONER

## 2018-07-11 NOTE — PROGRESS NOTES
Subjective:       Patient ID: Tiana Bosch is a 67 y.o. female.    Chief Complaint: COPD and mobility evaluation  She was last seen in primary care by me on 06/20/2018, she last saw Faisal on 04/10/2018. She is accompanied today by  and scooter provider representative  HPI   She is here today for continued mobility evaluation for scooter. She was evaluated prior on 05/01/2018. She is able to operate hand controls for a motorized scooter.She states cannot safely use cane or walker safely related to her severe COPD which limits her safe mobility.   Vitals:    07/11/18 0934   BP: 120/70   Pulse: 85   Temp: 98.2 °F (36.8 °C)     Review of Systems    She states she cannot use manual wheelchair based on listed diagnosis below in assessment and plan.  She will use the scooter inside and outside of home for safety.  Objective:      Physical Exam   Constitutional: She is oriented to person, place, and time. Vital signs are normal. She appears well-developed and well-nourished. She is active and cooperative.   She is using a cane for assistance with spouse during this visit   HENT:   Head: Normocephalic and atraumatic.   Right Ear: External ear normal.   Left Ear: External ear normal.   Nose: Nose normal.   Mouth/Throat: Oropharynx is clear and moist.   Neck: Normal range of motion.   Cardiovascular: Normal rate and regular rhythm.    Pulmonary/Chest: Effort normal and breath sounds normal.   Abdominal: Soft. There is no splenomegaly or hepatomegaly. There is no tenderness.   Lymphadenopathy:        Head (right side): No submental, no submandibular, no tonsillar, no preauricular, no posterior auricular and no occipital adenopathy present.        Head (left side): No submental, no submandibular, no tonsillar, no preauricular, no posterior auricular and no occipital adenopathy present.     She has no cervical adenopathy.   Neurological: She is alert and oriented to person, place, and time.   Skin: Skin is warm, dry  and intact.   Psychiatric: She has a normal mood and affect. Her speech is normal and behavior is normal. Judgment and thought content normal. Cognition and memory are normal.   Nursing note and vitals reviewed.      Assessment & Plan:       Muscle weakness of upper extremity    Weakness of both legs    Fibromyalgia    COPD, severe    Frequent falls    On home oxygen therapy    I have completed forms below for her mobility scooter. I have placed a copy of her PT evaluation for scooter to be scanned into her record.                    No Follow-up on file.

## 2018-07-13 ENCOUNTER — TELEPHONE (OUTPATIENT)
Dept: NEUROLOGY | Facility: CLINIC | Age: 68
End: 2018-07-13

## 2018-07-13 ENCOUNTER — LAB VISIT (OUTPATIENT)
Dept: LAB | Facility: HOSPITAL | Age: 68
End: 2018-07-13
Attending: PSYCHIATRY & NEUROLOGY
Payer: MEDICARE

## 2018-07-13 ENCOUNTER — OFFICE VISIT (OUTPATIENT)
Dept: NEUROLOGY | Facility: CLINIC | Age: 68
End: 2018-07-13
Payer: MEDICARE

## 2018-07-13 VITALS
RESPIRATION RATE: 17 BRPM | SYSTOLIC BLOOD PRESSURE: 124 MMHG | HEART RATE: 82 BPM | BODY MASS INDEX: 30.66 KG/M2 | WEIGHT: 166.63 LBS | HEIGHT: 62 IN | DIASTOLIC BLOOD PRESSURE: 67 MMHG

## 2018-07-13 DIAGNOSIS — R20.2 PARESTHESIA: ICD-10-CM

## 2018-07-13 DIAGNOSIS — M54.2 CERVICALGIA: Primary | ICD-10-CM

## 2018-07-13 DIAGNOSIS — R51.9 NONINTRACTABLE HEADACHE, UNSPECIFIED CHRONICITY PATTERN, UNSPECIFIED HEADACHE TYPE: ICD-10-CM

## 2018-07-13 DIAGNOSIS — R27.0 ATAXIA: ICD-10-CM

## 2018-07-13 DIAGNOSIS — R29.6 FREQUENT FALLS: ICD-10-CM

## 2018-07-13 DIAGNOSIS — R25.1 TREMOR: ICD-10-CM

## 2018-07-13 DIAGNOSIS — R41.3 MEMORY LOSS: ICD-10-CM

## 2018-07-13 DIAGNOSIS — R29.6 FREQUENT FALLS: Primary | ICD-10-CM

## 2018-07-13 DIAGNOSIS — R29.818 NEUROLOGICAL DEFICIT PRESENT: ICD-10-CM

## 2018-07-13 LAB
AMMONIA PLAS-SCNC: 33 UMOL/L
CERULOPLASMIN SERPL-MCNC: 26 MG/DL
CK SERPL-CCNC: 45 U/L
FOLATE SERPL-MCNC: >40 NG/ML
HCYS SERPL-SCNC: 13.4 UMOL/L

## 2018-07-13 PROCEDURE — 99205 OFFICE O/P NEW HI 60 MIN: CPT | Mod: S$PBB,,, | Performed by: PSYCHIATRY & NEUROLOGY

## 2018-07-13 PROCEDURE — 82390 ASSAY OF CERULOPLASMIN: CPT

## 2018-07-13 PROCEDURE — 99214 OFFICE O/P EST MOD 30 MIN: CPT | Mod: PBBFAC,PN | Performed by: PSYCHIATRY & NEUROLOGY

## 2018-07-13 PROCEDURE — 83090 ASSAY OF HOMOCYSTEINE: CPT

## 2018-07-13 PROCEDURE — 82140 ASSAY OF AMMONIA: CPT

## 2018-07-13 PROCEDURE — 84425 ASSAY OF VITAMIN B-1: CPT

## 2018-07-13 PROCEDURE — 99999 PR PBB SHADOW E&M-EST. PATIENT-LVL IV: CPT | Mod: PBBFAC,,, | Performed by: PSYCHIATRY & NEUROLOGY

## 2018-07-13 PROCEDURE — 84165 PROTEIN E-PHORESIS SERUM: CPT | Mod: 26,,, | Performed by: PATHOLOGY

## 2018-07-13 PROCEDURE — 84165 PROTEIN E-PHORESIS SERUM: CPT

## 2018-07-13 PROCEDURE — 84207 ASSAY OF VITAMIN B-6: CPT

## 2018-07-13 PROCEDURE — 82746 ASSAY OF FOLIC ACID SERUM: CPT

## 2018-07-13 PROCEDURE — 86592 SYPHILIS TEST NON-TREP QUAL: CPT

## 2018-07-13 PROCEDURE — 82550 ASSAY OF CK (CPK): CPT

## 2018-07-13 NOTE — TELEPHONE ENCOUNTER
Please see note from Dr. Mccoy office. Please advise if the pt should be directed to Spine Clinic or Pain Management. Thank you.

## 2018-07-13 NOTE — LETTER
July 23, 2018      Talita Childs DNP, APRN  [address] [facility] [address] [place] [address]           [facility] Neurology  [address] [facility] [address] [place] [address]  Phone: [phone]  Fax: [phone]          Patient: Tiana Bosch   MR Number: [id number]   YOB: 1950   Date of Visit: 7/13/2018       Dear Talita Childs:    Thank you for referring Tiana Bosch to me for evaluation. Attached you will find relevant portions of my assessment and plan of care.    If you have questions, please do not hesitate to call me. I look forward to following Tiana Bosch along with you.    Sincerely,    Quirino Lane Jr., MD    Enclosure  CC:  No Recipients    If you would like to receive this communication electronically, please contact [email or web address] or [phone] to request more information on [employer] Link access.    For providers and/or their staff who would like to refer a patient to [facility], please contact us through our one-stop-shop provider referral line, [facility], at [phone].    If you feel you have received this communication in error or would no longer like to receive these types of communications, please e-mail [email or web address]
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
## 2018-07-13 NOTE — TELEPHONE ENCOUNTER
Dr. Mccoy is not taking new patients at this time due to her being completely book until she goes on maternity leave. For headache, we call schedule with Dr. Hinton, however for neck pain please refer to Dr. Gauthier in pain management.

## 2018-07-13 NOTE — TELEPHONE ENCOUNTER
Pt was referred to Dr. Mccoy for head and neck pain. Please contact the pt to schedule appt. Thank you.

## 2018-07-14 LAB — RPR SER QL: NORMAL

## 2018-07-16 ENCOUNTER — TELEPHONE (OUTPATIENT)
Dept: NEUROLOGY | Facility: CLINIC | Age: 68
End: 2018-07-16

## 2018-07-16 DIAGNOSIS — R29.6 FREQUENT FALLS: Primary | ICD-10-CM

## 2018-07-16 LAB
ALBUMIN SERPL ELPH-MCNC: 4.32 G/DL
ALPHA1 GLOB SERPL ELPH-MCNC: 0.35 G/DL
ALPHA2 GLOB SERPL ELPH-MCNC: 0.89 G/DL
B-GLOBULIN SERPL ELPH-MCNC: 1.12 G/DL
GAMMA GLOB SERPL ELPH-MCNC: 0.92 G/DL
PATHOLOGIST INTERPRETATION SPE: NORMAL
PROT SERPL-MCNC: 7.6 G/DL

## 2018-07-16 NOTE — TELEPHONE ENCOUNTER
----- Message from Ros Sheridan sent at 7/16/2018  1:50 PM CDT -----  Regarding: lab test cancellation/ recollection notification  There was an issue with a Thiamine blood sample  drawn on 7/13/2018 on this patient. The reference lab did not store the sample at the proper temperature once it arrived so they had to cancel the order. If this test is still needed, please place a new order and schedule a new appointment.    If you have any questions, please call the Send Out Lab at [phone], ext [phone].

## 2018-07-16 NOTE — TELEPHONE ENCOUNTER
In that case, she'll have to see Dr. Hinton for her headaches and Dr. Gauthier for her neck pain.  Referral in.

## 2018-07-16 NOTE — TELEPHONE ENCOUNTER
Called patient and scheduled new patient appointment per Dr Lane for daily headaches. Appointment successfully scheduled. Patient verbalized understanding.

## 2018-07-17 LAB — PYRIDOXAL SERPL-MCNC: 64 UG/L (ref 5–50)

## 2018-07-23 ENCOUNTER — OFFICE VISIT (OUTPATIENT)
Dept: FAMILY MEDICINE | Facility: CLINIC | Age: 68
End: 2018-07-23
Payer: MEDICARE

## 2018-07-23 ENCOUNTER — LAB VISIT (OUTPATIENT)
Dept: LAB | Facility: HOSPITAL | Age: 68
End: 2018-07-23
Attending: PSYCHIATRY & NEUROLOGY
Payer: MEDICARE

## 2018-07-23 VITALS
HEART RATE: 88 BPM | RESPIRATION RATE: 16 BRPM | DIASTOLIC BLOOD PRESSURE: 72 MMHG | WEIGHT: 166 LBS | SYSTOLIC BLOOD PRESSURE: 124 MMHG | BODY MASS INDEX: 30.55 KG/M2 | HEIGHT: 62 IN

## 2018-07-23 DIAGNOSIS — E78.5 HYPERLIPIDEMIA LDL GOAL <70: ICD-10-CM

## 2018-07-23 DIAGNOSIS — R29.6 FREQUENT FALLS: ICD-10-CM

## 2018-07-23 DIAGNOSIS — E11.42 TYPE 2 DIABETES MELLITUS WITH DIABETIC POLYNEUROPATHY, WITHOUT LONG-TERM CURRENT USE OF INSULIN: Primary | ICD-10-CM

## 2018-07-23 DIAGNOSIS — I25.10 CORONARY ARTERY DISEASE INVOLVING NATIVE CORONARY ARTERY OF NATIVE HEART WITHOUT ANGINA PECTORIS: Chronic | ICD-10-CM

## 2018-07-23 DIAGNOSIS — I10 ESSENTIAL HYPERTENSION: Chronic | ICD-10-CM

## 2018-07-23 DIAGNOSIS — J44.9 COPD, SEVERE: ICD-10-CM

## 2018-07-23 PROBLEM — M62.81 MUSCLE WEAKNESS OF UPPER EXTREMITY: Status: RESOLVED | Noted: 2018-02-12 | Resolved: 2018-07-23

## 2018-07-23 PROBLEM — M54.2 NECK PAIN: Status: RESOLVED | Noted: 2018-02-12 | Resolved: 2018-07-23

## 2018-07-23 PROBLEM — R29.898 WEAKNESS OF BOTH LEGS: Status: RESOLVED | Noted: 2017-12-22 | Resolved: 2018-07-23

## 2018-07-23 PROBLEM — J44.1 COPD EXACERBATION: Status: RESOLVED | Noted: 2018-06-10 | Resolved: 2018-07-23

## 2018-07-23 PROCEDURE — 99999 PR PBB SHADOW E&M-EST. PATIENT-LVL III: CPT | Mod: PBBFAC,,, | Performed by: FAMILY MEDICINE

## 2018-07-23 PROCEDURE — 36415 COLL VENOUS BLD VENIPUNCTURE: CPT | Mod: PO

## 2018-07-23 PROCEDURE — 84425 ASSAY OF VITAMIN B-1: CPT

## 2018-07-23 PROCEDURE — 99213 OFFICE O/P EST LOW 20 MIN: CPT | Mod: PBBFAC,PO | Performed by: FAMILY MEDICINE

## 2018-07-23 PROCEDURE — 99214 OFFICE O/P EST MOD 30 MIN: CPT | Mod: S$PBB,,, | Performed by: FAMILY MEDICINE

## 2018-07-23 NOTE — PROGRESS NOTES
Subjective:       Patient ID: Tiana Bosch is a 67 y.o. female.    Chief Complaint: Diabetes (Patient here for 3 month follow up) and Hypertension    Here for f/u htn and chronic medical issues. Doing well overall. Had hospital stay for copd exacerbation. Would like cardiologist here.      Diabetes   She presents for her follow-up diabetic visit. She has type 2 diabetes mellitus. Her disease course has been improving. Pertinent negatives for hypoglycemia include no nervousness/anxiousness. Pertinent negatives for diabetes include no chest pain and no fatigue.   Hypertension   Pertinent negatives include no chest pain, palpitations or shortness of breath.     Review of Systems   Constitutional: Negative for chills, fatigue and fever.   Respiratory: Negative for cough, chest tightness and shortness of breath.    Cardiovascular: Negative for chest pain, palpitations and leg swelling.   Gastrointestinal: Negative for abdominal distention and abdominal pain.   Endocrine: Negative for cold intolerance and heat intolerance.   Skin: Negative for rash.   Psychiatric/Behavioral: Negative for dysphoric mood. The patient is not nervous/anxious.        Objective:      Physical Exam   Constitutional: She appears well-developed and well-nourished.   HENT:   Head: Normocephalic and atraumatic.   Cardiovascular: Normal rate, regular rhythm and normal heart sounds.    Pulmonary/Chest: Effort normal and breath sounds normal.   Psychiatric: She has a normal mood and affect.   Nursing note and vitals reviewed.      Assessment:       1. Type 2 diabetes mellitus with diabetic polyneuropathy, without long-term current use of insulin    2. Essential hypertension    3. COPD, severe    4. Hyperlipidemia LDL goal <70    5. Coronary artery disease involving native coronary artery of native heart without angina pectoris        Plan:       Type 2 diabetes mellitus with diabetic polyneuropathy, without long-term current use of  insulin    Essential hypertension    COPD, severe    Hyperlipidemia LDL goal <70    Coronary artery disease involving native coronary artery of native heart without angina pectoris  -     Ambulatory referral to Cardiology      Will monitor chronic medical issues and continue current plan of care.      Follow-up in about 4 months (around 11/23/2018), or if symptoms worsen or fail to improve.

## 2018-07-25 ENCOUNTER — PATIENT MESSAGE (OUTPATIENT)
Dept: FAMILY MEDICINE | Facility: CLINIC | Age: 68
End: 2018-07-25

## 2018-07-26 LAB — VIT B1 SERPL-MCNC: 126 UG/L (ref 38–122)

## 2018-07-26 RX ORDER — INSULIN PUMP SYRINGE, 3 ML
EACH MISCELLANEOUS
Qty: 1 EACH | Refills: 0 | Status: SHIPPED | OUTPATIENT
Start: 2018-07-26 | End: 2019-01-01 | Stop reason: SDUPTHER

## 2018-08-07 ENCOUNTER — OFFICE VISIT (OUTPATIENT)
Dept: CARDIOLOGY | Facility: CLINIC | Age: 68
End: 2018-08-07
Payer: MEDICARE

## 2018-08-07 VITALS
BODY MASS INDEX: 31.08 KG/M2 | WEIGHT: 168.88 LBS | SYSTOLIC BLOOD PRESSURE: 156 MMHG | HEIGHT: 62 IN | DIASTOLIC BLOOD PRESSURE: 79 MMHG | HEART RATE: 85 BPM

## 2018-08-07 DIAGNOSIS — I27.21 PAH (PULMONARY ARTERY HYPERTENSION): ICD-10-CM

## 2018-08-07 DIAGNOSIS — I25.10 CORONARY ARTERY DISEASE INVOLVING NATIVE CORONARY ARTERY OF NATIVE HEART WITHOUT ANGINA PECTORIS: Primary | Chronic | ICD-10-CM

## 2018-08-07 DIAGNOSIS — E78.5 HYPERLIPIDEMIA LDL GOAL <70: ICD-10-CM

## 2018-08-07 DIAGNOSIS — I10 ESSENTIAL HYPERTENSION: Chronic | ICD-10-CM

## 2018-08-07 DIAGNOSIS — J44.9 COPD, SEVERE: ICD-10-CM

## 2018-08-07 PROCEDURE — 99212 OFFICE O/P EST SF 10 MIN: CPT | Mod: PBBFAC,PO | Performed by: INTERNAL MEDICINE

## 2018-08-07 PROCEDURE — 99999 PR PBB SHADOW E&M-EST. PATIENT-LVL II: CPT | Mod: PBBFAC,,, | Performed by: INTERNAL MEDICINE

## 2018-08-07 PROCEDURE — 99204 OFFICE O/P NEW MOD 45 MIN: CPT | Mod: S$PBB,,, | Performed by: INTERNAL MEDICINE

## 2018-08-07 NOTE — LETTER
August 7, 2018      MARY JO Malone MD  [address] [facility] [address] [place] [address]           [facility] Card[facility]  [address] [facility] [place] [address]  Phone: [phone]          Patient: Tiana Bosch   MR Number: [id number]   YOB: 1950   Date of Visit: 8/7/2018       Dear Dr. MARY JO Malone:    Thank you for referring Tiana Bosch to me for evaluation. Attached you will find relevant portions of my assessment and plan of care.    If you have questions, please do not hesitate to call me. I look forward to following Tiana Bosch along with you.    Sincerely,    Juan Luis Gonzalez MD    Enclosure  CC:  No Recipients    If you would like to receive this communication electronically, please contact [email or web address] or [phone] to request more information on [employer] Link access.    For providers and/or their staff who would like to refer a patient to [facility], please contact us through our one-stop-shop provider referral line, [facility] , at [phone].    If you feel you have received this communication in error or would no longer like to receive these types of communications, please e-mail [email or web address]

## 2018-08-07 NOTE — PROGRESS NOTES
Subjective:    Patient ID:  Tiana Bosch is a 68 y.o. female who presents for evaluation of Coronary Artery Disease      Here to establish care. Stents 3 years ago. 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 Severe leg weakness.        Review of Systems   Cardiovascular: Positive for dyspnea on exertion.   Musculoskeletal: Positive for muscle weakness and myalgias.   All other systems reviewed and are negative.       Objective:    Physical Exam   Constitutional: She is oriented to person, place, and time. She appears well-developed and well-nourished.   HENT:   Head: Normocephalic and atraumatic.   Eyes: Conjunctivae and EOM are normal. Pupils are equal, round, and reactive to light. Right eye exhibits no discharge. Left eye exhibits no discharge. No scleral icterus.   Neck: Normal range of motion. Neck supple. No JVD present. No tracheal deviation present. No thyromegaly present.   Cardiovascular: Normal rate and regular rhythm.    Murmur heard.   Early systolic murmur is present  at the upper right sternal border  Pulses:       Carotid pulses are 2+ on the right side, and 2+ on the left side.       Dorsalis pedis pulses are 2+ on the right side, and 2+ on the left side.        Posterior tibial pulses are 2+ on the right side, and 2+ on the left side.   Pulmonary/Chest: Effort normal and breath sounds normal. No stridor. No respiratory distress. She has no wheezes. She has no rales.   Abdominal: Bowel sounds are normal. She exhibits no distension. There is no tenderness. There is no rebound and no guarding.   Musculoskeletal: Normal range of motion. She exhibits edema (1 plus). She exhibits no tenderness or deformity.   Lymphadenopathy:     She has no cervical adenopathy.   Neurological: She is alert and oriented to person, place, and time.   Skin: Skin is warm and dry. No rash noted. No erythema. No pallor.   Psychiatric: She has a normal mood and affect. Her behavior is normal. Judgment and thought content  normal.         Assessment:       1. Coronary artery disease involving native coronary artery of native heart without angina pectoris    2. Hyperlipidemia LDL goal <70    3. Essential hypertension    4. PAH (pulmonary artery hypertension)    5. COPD, severe         Plan:       Coronary artery disease involving native coronary artery of native heart without angina pectoris  -     Transthoracic echo (TTE) complete; Future    Hyperlipidemia LDL goal <70    Essential hypertension  -     Transthoracic echo (TTE) complete; Future    PAH (pulmonary artery hypertension)  -     Transthoracic echo (TTE) complete; Future    COPD, severe    I have question about the possibility of a myopathy -secondary to statin?- her pulmonary hypertension was evident on las echocardiogram almost a year ago. Will recheck echocardiogram and take it from there.

## 2018-08-13 ENCOUNTER — CLINICAL SUPPORT (OUTPATIENT)
Dept: CARDIOLOGY | Facility: CLINIC | Age: 68
End: 2018-08-13
Attending: INTERNAL MEDICINE
Payer: MEDICARE

## 2018-08-13 VITALS
SYSTOLIC BLOOD PRESSURE: 110 MMHG | BODY MASS INDEX: 31.08 KG/M2 | WEIGHT: 168.88 LBS | DIASTOLIC BLOOD PRESSURE: 65 MMHG | HEART RATE: 88 BPM | HEIGHT: 62 IN

## 2018-08-13 DIAGNOSIS — I25.10 CORONARY ARTERY DISEASE INVOLVING NATIVE CORONARY ARTERY OF NATIVE HEART WITHOUT ANGINA PECTORIS: ICD-10-CM

## 2018-08-13 DIAGNOSIS — I27.21 PAH (PULMONARY ARTERY HYPERTENSION): ICD-10-CM

## 2018-08-13 DIAGNOSIS — I10 ESSENTIAL HYPERTENSION: ICD-10-CM

## 2018-08-13 LAB
ASCENDING AORTA: 2.6 CM
AV MEAN GRADIENT: 3.65 MMHG
AV PEAK GRADIENT: 7.03 MMHG
AV VALVE AREA: 2.3 CM2
BSA FOR ECHO PROCEDURE: 1.83 M2
CV ECHO LV RWT: 0.48 CM
DOP CALC AO PEAK VEL: 1.33 M/S
DOP CALC AO VTI: 27.06 CM
DOP CALC LVOT AREA: 2.98 CM2
DOP CALC LVOT DIAMETER: 1.95 CM
DOP CALC LVOT STROKE VOLUME: 62.18 CM3
DOP CALCLVOT PEAK VEL VTI: 20.83 CM
E WAVE DECELERATION TIME: 185.24 MSEC
E/A RATIO: 0.87
E/E' RATIO: 9.24
ECHO LV POSTERIOR WALL: 0.87 CM (ref 0.6–1.1)
FRACTIONAL SHORTENING: 35 % (ref 28–44)
INTERVENTRICULAR SEPTUM: 1.01 CM (ref 0.6–1.1)
IVRT: 0.11 MSEC
LA MAJOR: 5.63 CM
LA MINOR: 4.49 CM
LA WIDTH: 3.5 CM
LEFT ATRIUM SIZE: 3.65 CM
LEFT ATRIUM VOLUME INDEX: 29.6 ML/M2
LEFT ATRIUM VOLUME: 54.25 CM3
LEFT INTERNAL DIMENSION IN SYSTOLE: 2.54 CM (ref 2.1–4)
LEFT VENTRICLE MASS INDEX: 60.9 G/M2
LEFT VENTRICULAR INTERNAL DIMENSION IN DIASTOLE: 3.89 CM (ref 3.5–6)
LEFT VENTRICULAR MASS: 111.45 G
LV LATERAL E/E' RATIO: 9.7
LV SEPTAL E/E' RATIO: 8.82
MV PEAK A VEL: 1.12 M/S
MV PEAK E VEL: 0.97 M/S
MV STENOSIS PRESSURE HALF TIME: 53.72 MS
MV VALVE AREA P 1/2 METHOD: 4.1 CM2
PISA TR MAX VEL: 3.69 M/S
PULM VEIN S/D RATIO: 1.27
PV PEAK D VEL: 0.52 M/S
PV PEAK S VEL: 0.66 M/S
RA MAJOR: 5.39 CM
RA PRESSURE: 3 MMHG
RA WIDTH: 2.77 CM
RIGHT VENTRICULAR END-DIASTOLIC DIMENSION: 3.16 CM
SINUS: 2.5 CM
STJ: 2.2 CM
TDI LATERAL: 0.1
TDI SEPTAL: 0.11
TDI: 0.11
TR MAX PG: 54.46 MMHG
TRICUSPID ANNULAR PLANE SYSTOLIC EXCURSION: 0.02 CM
TV REST PULMONARY ARTERY PRESSURE: 57.46 MMHG

## 2018-08-13 PROCEDURE — 93306 TTE W/DOPPLER COMPLETE: CPT | Mod: PBBFAC,PO | Performed by: INTERNAL MEDICINE

## 2018-08-13 PROCEDURE — 99212 OFFICE O/P EST SF 10 MIN: CPT | Mod: PBBFAC,PO,25

## 2018-08-13 PROCEDURE — 99999 PR PBB SHADOW E&M-EST. PATIENT-LVL II: CPT | Mod: PBBFAC,,,

## 2018-08-13 RX ORDER — MONTELUKAST SODIUM 10 MG/1
10 TABLET ORAL NIGHTLY
Qty: 90 TABLET | Refills: 0 | OUTPATIENT
Start: 2018-08-13

## 2018-08-14 RX ORDER — MONTELUKAST SODIUM 10 MG/1
10 TABLET ORAL NIGHTLY
Qty: 90 TABLET | Refills: 3 | Status: SHIPPED | OUTPATIENT
Start: 2018-08-14 | End: 2018-10-03

## 2018-08-14 RX ORDER — MONTELUKAST SODIUM 10 MG/1
10 TABLET ORAL NIGHTLY
Qty: 90 TABLET | Refills: 0 | OUTPATIENT
Start: 2018-08-14

## 2018-08-16 ENCOUNTER — PATIENT MESSAGE (OUTPATIENT)
Dept: FAMILY MEDICINE | Facility: CLINIC | Age: 68
End: 2018-08-16

## 2018-08-16 ENCOUNTER — TELEPHONE (OUTPATIENT)
Dept: TRANSPLANT | Facility: CLINIC | Age: 68
End: 2018-08-16

## 2018-08-16 ENCOUNTER — TELEPHONE (OUTPATIENT)
Dept: CARDIOLOGY | Facility: CLINIC | Age: 68
End: 2018-08-16

## 2018-08-16 DIAGNOSIS — R06.82 TACHYPNEA: ICD-10-CM

## 2018-08-16 DIAGNOSIS — Z79.899 POLYPHARMACY: Primary | ICD-10-CM

## 2018-08-16 DIAGNOSIS — I27.20 CHRONIC PULMONARY HYPERTENSION: ICD-10-CM

## 2018-08-16 DIAGNOSIS — I27.9 CHRONIC PULMONARY HEART DISEASE: Primary | ICD-10-CM

## 2018-08-16 NOTE — TELEPHONE ENCOUNTER
Referral placed for Consult to be set up for Elevated Pulmonary Pressures Per Dr. REYNA   Please schedule patient.

## 2018-08-16 NOTE — TELEPHONE ENCOUNTER
----- Message from Christina Dixon sent at 8/16/2018 11:08 AM CDT -----  Regarding: RE: New PH  Pt states she currently has a PH physician and does not want to schedule.   ----- Message -----  From: Peggy Anderson RN  Sent: 8/16/2018  10:36 AM  To: [facility] Heart Transplant Schedulers  Subject: New PH                                           Hello,    Please schedule this patient for consult, walk, labs and ECHO. Referral from Dr. Gonzalez.    Thanks!  [place]

## 2018-08-20 ENCOUNTER — TELEPHONE (OUTPATIENT)
Dept: FAMILY MEDICINE | Facility: CLINIC | Age: 68
End: 2018-08-20

## 2018-08-20 NOTE — TELEPHONE ENCOUNTER
Please make her an appt with someone who can help her with a controlled medication that she desires.

## 2018-08-20 NOTE — TELEPHONE ENCOUNTER
Spoke to pt and she states that the Buspar is not working for her and she would like an alternative. Please advise. Thanks.

## 2018-08-20 NOTE — TELEPHONE ENCOUNTER
----- Message from Shaunna Lima sent at 8/20/2018  9:44 AM CDT -----  Name of Who is Calling: JOSÉ BOSS [[id number]]      What is the request in detail: pt states she cannot take busPIRone (BUSPAR) 7.5 MG tablet.Pt states its not working and wants to know if she can have an alternate medication.      Can the clinic reply by [facility]:  Yes        What Number to Call Back if not [facility]: [phone]

## 2018-08-22 RX ORDER — INHALER, ASSIST DEVICES
SPACER (EA) MISCELLANEOUS
Qty: 1 EACH | Refills: 0 | Status: SHIPPED | OUTPATIENT
Start: 2018-08-22

## 2018-08-24 ENCOUNTER — OFFICE VISIT (OUTPATIENT)
Dept: NEUROLOGY | Facility: CLINIC | Age: 68
End: 2018-08-24
Payer: MEDICARE

## 2018-08-24 VITALS
WEIGHT: 166.63 LBS | HEART RATE: 87 BPM | BODY MASS INDEX: 30.46 KG/M2 | SYSTOLIC BLOOD PRESSURE: 136 MMHG | DIASTOLIC BLOOD PRESSURE: 63 MMHG

## 2018-08-24 DIAGNOSIS — M47.812 OSTEOARTHRITIS OF CERVICAL SPINE, UNSPECIFIED SPINAL OSTEOARTHRITIS COMPLICATION STATUS: ICD-10-CM

## 2018-08-24 DIAGNOSIS — R25.1 TREMOR: Primary | ICD-10-CM

## 2018-08-24 DIAGNOSIS — R41.3 MEMORY LOSS: ICD-10-CM

## 2018-08-24 DIAGNOSIS — R26.89 MULTIFACTORIAL GAIT DISORDER: ICD-10-CM

## 2018-08-24 PROCEDURE — 99213 OFFICE O/P EST LOW 20 MIN: CPT | Mod: PBBFAC,PN | Performed by: PSYCHIATRY & NEUROLOGY

## 2018-08-24 PROCEDURE — 99215 OFFICE O/P EST HI 40 MIN: CPT | Mod: S$PBB,,, | Performed by: PSYCHIATRY & NEUROLOGY

## 2018-08-24 PROCEDURE — 99999 PR PBB SHADOW E&M-EST. PATIENT-LVL III: CPT | Mod: PBBFAC,,, | Performed by: PSYCHIATRY & NEUROLOGY

## 2018-08-24 RX ORDER — LEVETIRACETAM 500 MG/1
500 TABLET ORAL 2 TIMES DAILY
Qty: 60 TABLET | Refills: 11 | Status: SHIPPED | OUTPATIENT
Start: 2018-08-24 | End: 2018-08-27
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
## 2018-08-27 ENCOUNTER — OFFICE VISIT (OUTPATIENT)
Dept: FAMILY MEDICINE | Facility: CLINIC | Age: 68
End: 2018-08-27
Payer: MEDICARE

## 2018-08-27 VITALS
BODY MASS INDEX: 30.95 KG/M2 | DIASTOLIC BLOOD PRESSURE: 72 MMHG | WEIGHT: 168.19 LBS | SYSTOLIC BLOOD PRESSURE: 134 MMHG | HEIGHT: 62 IN | HEART RATE: 72 BPM | RESPIRATION RATE: 18 BRPM

## 2018-08-27 DIAGNOSIS — F41.9 ANXIETY: Primary | ICD-10-CM

## 2018-08-27 DIAGNOSIS — M79.7 FIBROMYALGIA: Chronic | ICD-10-CM

## 2018-08-27 PROCEDURE — 99213 OFFICE O/P EST LOW 20 MIN: CPT | Mod: PBBFAC,PO | Performed by: FAMILY MEDICINE

## 2018-08-27 PROCEDURE — 99214 OFFICE O/P EST MOD 30 MIN: CPT | Mod: S$PBB,,, | Performed by: FAMILY MEDICINE

## 2018-08-27 PROCEDURE — 99999 PR PBB SHADOW E&M-EST. PATIENT-LVL III: CPT | Mod: PBBFAC,,, | Performed by: FAMILY MEDICINE

## 2018-08-27 RX ORDER — ESCITALOPRAM OXALATE 5 MG/1
5 TABLET ORAL DAILY
Qty: 30 TABLET | Refills: 2 | Status: SHIPPED | OUTPATIENT
Start: 2018-08-27 | End: 2018-10-03

## 2018-08-27 NOTE — PROGRESS NOTES
Subjective:       Patient ID: Tiana Bosch is a 68 y.o. female.    Chief Complaint: Diabetes (Patient reports that glipizide is causing weight gain) and Depression (Patient unable to take Buspar due to muscle aches, would like to discuss alternative )    Here for f/u anxiety. She stopped buspar as she felt it caused muscle aches.      Review of Systems   Constitutional: Negative for chills, fatigue and fever.   Respiratory: Negative for cough, chest tightness and shortness of breath.    Cardiovascular: Negative for chest pain, palpitations and leg swelling.   Gastrointestinal: Negative for abdominal distention and abdominal pain.   Endocrine: Negative for cold intolerance and heat intolerance.   Skin: Negative for rash.   Psychiatric/Behavioral: Negative for dysphoric mood and suicidal ideas. The patient is nervous/anxious.        Objective:      Physical Exam   Constitutional: She appears well-developed and well-nourished.   HENT:   Head: Normocephalic and atraumatic.   Cardiovascular: Normal rate, regular rhythm and normal heart sounds.   Pulmonary/Chest: Effort normal and breath sounds normal.   Psychiatric: She has a normal mood and affect.   Nursing note and vitals reviewed.      Assessment:       1. Anxiety    2. Fibromyalgia        Plan:       Anxiety    Fibromyalgia    Other orders  -     escitalopram oxalate (LEXAPRO) 5 MG Tab; Take 1 tablet (5 mg total) by mouth once daily.  Dispense: 30 tablet; Refill: 2      Call with update before end of week. Call for mood worsening but trial of lexapro and monitor.    Follow-up in about 2 weeks (around 9/10/2018), or if symptoms worsen or fail to improve.

## 2018-08-29 ENCOUNTER — OFFICE VISIT (OUTPATIENT)
Dept: CARDIOLOGY | Facility: CLINIC | Age: 68
End: 2018-08-29
Payer: MEDICARE

## 2018-08-29 VITALS
DIASTOLIC BLOOD PRESSURE: 67 MMHG | HEART RATE: 86 BPM | SYSTOLIC BLOOD PRESSURE: 127 MMHG | BODY MASS INDEX: 31.08 KG/M2 | WEIGHT: 168.88 LBS | HEIGHT: 62 IN

## 2018-08-29 DIAGNOSIS — J44.9 COPD, SEVERE: ICD-10-CM

## 2018-08-29 DIAGNOSIS — I25.10 CORONARY ARTERY DISEASE INVOLVING NATIVE CORONARY ARTERY OF NATIVE HEART WITHOUT ANGINA PECTORIS: Chronic | ICD-10-CM

## 2018-08-29 DIAGNOSIS — E78.5 HYPERLIPIDEMIA LDL GOAL <70: ICD-10-CM

## 2018-08-29 DIAGNOSIS — I10 ESSENTIAL HYPERTENSION: Chronic | ICD-10-CM

## 2018-08-29 DIAGNOSIS — I27.21 PAH (PULMONARY ARTERY HYPERTENSION): Primary | ICD-10-CM

## 2018-08-29 PROCEDURE — 99999 PR PBB SHADOW E&M-EST. PATIENT-LVL II: CPT | Mod: PBBFAC,,, | Performed by: INTERNAL MEDICINE

## 2018-08-29 PROCEDURE — 99212 OFFICE O/P EST SF 10 MIN: CPT | Mod: PBBFAC,PO | Performed by: INTERNAL MEDICINE

## 2018-08-29 PROCEDURE — 99214 OFFICE O/P EST MOD 30 MIN: CPT | Mod: S$PBB,,, | Performed by: INTERNAL MEDICINE

## 2018-08-29 NOTE — PROGRESS NOTES
Subjective:    Patient ID:  Tiana Bosch is a 68 y.o. female who presents for follow-up of Follow-up (follow up ) and Results      Here for follow up. Her echocardiogram shows normal EF and normal wall motion. She had an inferior wall MI 3 years ago and 3 stents were placed in the RCA successfully. Main symptom right now is Dyspnea. Uses O2. Pulmonary hypertension. Dr Starks who did not see the patient ordered blood work including BNP.        Review of Systems   Cardiovascular: Positive for dyspnea on exertion.   Respiratory: Positive for shortness of breath.    All other systems reviewed and are negative.       Objective:      Physical Exam   Constitutional: She is oriented to person, place, and time. She appears well-developed and well-nourished.   HENT:   Head: Normocephalic and atraumatic.   Eyes: Conjunctivae and EOM are normal. Pupils are equal, round, and reactive to light. Right eye exhibits no discharge. Left eye exhibits no discharge. No scleral icterus.   Neck: Normal range of motion. Neck supple. No JVD present. No tracheal deviation present. No thyromegaly present.   Cardiovascular: Normal rate and regular rhythm.   Murmur heard.   Early systolic murmur is present at the upper right sternal border.  Pulses:       Carotid pulses are 2+ on the right side, and 2+ on the left side.       Dorsalis pedis pulses are 2+ on the right side, and 2+ on the left side.        Posterior tibial pulses are 2+ on the right side, and 2+ on the left side.   Pulmonary/Chest: Effort normal and breath sounds normal. No stridor. No respiratory distress. She has no wheezes. She has no rales.   Abdominal: Bowel sounds are normal. She exhibits no distension. There is no tenderness. There is no rebound and no guarding.   Musculoskeletal: Normal range of motion. She exhibits no edema (1 plus), tenderness or deformity.   Lymphadenopathy:     She has no cervical adenopathy.   Neurological: She is alert and oriented to person,  place, and time.   Skin: Skin is warm and dry. No rash noted. No erythema. No pallor.   Psychiatric: She has a normal mood and affect. Her behavior is normal. Judgment and thought content normal.         Assessment:       1. PAH (pulmonary artery hypertension)    2. COPD, severe    3. Hyperlipidemia LDL goal <70    4. Essential hypertension    5. Coronary artery disease involving native coronary artery of native heart without angina pectoris         Plan:       PAH (pulmonary artery hypertension)    COPD, severe    Hyperlipidemia LDL goal <70    Essential hypertension    Coronary artery disease involving native coronary artery of native heart without angina pectoris    Echocardiogram actually looks good in general with very good EF and normal wall motion. RTC in 3 months. Will see pulmonary today and it will be determined if she is a candidate for any specific trettment for that condition or an upgrade in her COPD RX.

## 2018-08-30 ENCOUNTER — PATIENT MESSAGE (OUTPATIENT)
Dept: FAMILY MEDICINE | Facility: CLINIC | Age: 68
End: 2018-08-30

## 2018-08-30 ENCOUNTER — PATIENT MESSAGE (OUTPATIENT)
Dept: CARDIOLOGY | Facility: CLINIC | Age: 68
End: 2018-08-30

## 2018-08-31 DIAGNOSIS — Z12.11 COLON CANCER SCREENING: ICD-10-CM

## 2018-08-31 RX ORDER — FLUOCINOLONE ACETONIDE 0.11 MG/ML
OIL AURICULAR (OTIC)
Qty: 20 ML | Refills: 0 | Status: SHIPPED | OUTPATIENT
Start: 2018-08-31 | End: 2019-01-01 | Stop reason: SDUPTHER

## 2018-09-04 RX ORDER — METFORMIN HYDROCHLORIDE 500 MG/1
TABLET, EXTENDED RELEASE ORAL
Qty: 180 TABLET | Refills: 0 | Status: SHIPPED | OUTPATIENT
Start: 2018-09-04 | End: 2018-01-01 | Stop reason: SDUPTHER

## 2018-09-12 ENCOUNTER — OFFICE VISIT (OUTPATIENT)
Dept: FAMILY MEDICINE | Facility: CLINIC | Age: 68
End: 2018-09-12
Payer: MEDICARE

## 2018-09-12 VITALS
HEART RATE: 81 BPM | DIASTOLIC BLOOD PRESSURE: 70 MMHG | SYSTOLIC BLOOD PRESSURE: 134 MMHG | OXYGEN SATURATION: 88 % | BODY MASS INDEX: 31.2 KG/M2 | WEIGHT: 169.56 LBS | HEIGHT: 62 IN | TEMPERATURE: 98 F

## 2018-09-12 DIAGNOSIS — I10 ESSENTIAL HYPERTENSION: Chronic | ICD-10-CM

## 2018-09-12 DIAGNOSIS — Z99.81 ON HOME OXYGEN THERAPY: ICD-10-CM

## 2018-09-12 DIAGNOSIS — E66.9 OBESITY (BMI 30-39.9): ICD-10-CM

## 2018-09-12 DIAGNOSIS — E11.42 TYPE 2 DIABETES MELLITUS WITH DIABETIC POLYNEUROPATHY, WITHOUT LONG-TERM CURRENT USE OF INSULIN: ICD-10-CM

## 2018-09-12 DIAGNOSIS — F32.89 OTHER DEPRESSION: Primary | ICD-10-CM

## 2018-09-12 PROCEDURE — 99215 OFFICE O/P EST HI 40 MIN: CPT | Mod: PBBFAC,PO,25 | Performed by: NURSE PRACTITIONER

## 2018-09-12 PROCEDURE — 99999 PR PBB SHADOW E&M-EST. PATIENT-LVL V: CPT | Mod: PBBFAC,,, | Performed by: NURSE PRACTITIONER

## 2018-09-12 PROCEDURE — 90662 IIV NO PRSV INCREASED AG IM: CPT | Mod: PBBFAC,PO

## 2018-09-12 PROCEDURE — 99213 OFFICE O/P EST LOW 20 MIN: CPT | Mod: S$PBB,,, | Performed by: NURSE PRACTITIONER

## 2018-09-12 RX ORDER — INHALER, ASSIST DEVICES
SPACER (EA) MISCELLANEOUS
Qty: 1 EACH | Refills: 0 | Status: ON HOLD | OUTPATIENT
Start: 2018-09-12 | End: 2018-10-05 | Stop reason: HOSPADM

## 2018-09-12 RX ORDER — GLIPIZIDE 5 MG/1
5 TABLET ORAL
Qty: 180 TABLET | Refills: 1 | Status: SHIPPED | OUTPATIENT
Start: 2018-09-12 | End: 2018-01-01 | Stop reason: SDUPTHER

## 2018-09-12 RX ORDER — AMITRIPTYLINE HYDROCHLORIDE 25 MG/1
25 TABLET, FILM COATED ORAL NIGHTLY PRN
Qty: 30 TABLET | Refills: 3 | Status: SHIPPED | OUTPATIENT
Start: 2018-09-12 | End: 2018-10-12

## 2018-09-12 NOTE — PROGRESS NOTES
"Subjective:       Patient ID: Tiana Bosch is a 68 y.o. female.    Chief Complaint: Anxiety (2 week follow up )  She was last seen in primary care by Faisal on 08/27/2018. I last saw her on 07/11/2018.  HPI   She is here for follow up 2 wk with anxiety. She states "don't do good with antidepressants". States has had feelings of more sad and meaner since being on the medication. States stool has been like pencil size since on new medication 5 mg (escitalopram).   Feeling sharp pain in both feet that she has been having for over 6 months.   Vitals:    09/12/18 1452   BP: 134/70   Pulse: 81   Temp: 98.1 °F (36.7 °C)     BP Readings from Last 3 Encounters:   09/12/18 134/70   08/29/18 127/67   08/27/18 134/72     Review of Systems    Objective:      Physical Exam   Constitutional: She is oriented to person, place, and time. She appears well-developed and well-nourished.   HENT:   Head: Normocephalic and atraumatic.   Right Ear: Hearing, tympanic membrane, external ear and ear canal normal.   Left Ear: Hearing, tympanic membrane, external ear and ear canal normal.   Nose: Nose normal.   Mouth/Throat: Uvula is midline, oropharynx is clear and moist and mucous membranes are normal.   Eyes: Lids are normal.   Neck: Trachea normal, normal range of motion, full passive range of motion without pain and phonation normal. Neck supple.   Cardiovascular: Normal rate and regular rhythm.   Pulmonary/Chest: Effort normal and breath sounds normal.   Abdominal: Soft. Bowel sounds are normal. There is no tenderness.       Musculoskeletal: Normal range of motion.   Lymphadenopathy:        Head (right side): No submental, no submandibular, no tonsillar, no preauricular, no posterior auricular and no occipital adenopathy present.        Head (left side): No submental, no submandibular, no tonsillar, no preauricular, no posterior auricular and no occipital adenopathy present.     She has no cervical adenopathy.   Neurological: She is " alert and oriented to person, place, and time.   Skin: Skin is warm, dry and intact.   Psychiatric: She has a normal mood and affect. Her speech is normal and behavior is normal. Judgment and thought content normal. Cognition and memory are normal. She expresses no homicidal and no suicidal ideation. She expresses no suicidal plans and no homicidal plans.   Nursing note and vitals reviewed.      Assessment & Plan:       Other depression  -     amitriptyline (ELAVIL) 25 MG tablet; Take 1 tablet (25 mg total) by mouth nightly as needed for Pain.  Dispense: 30 tablet; Refill: 3    Essential hypertension    Type 2 diabetes mellitus with diabetic polyneuropathy, without long-term current use of insulin    On home oxygen therapy    Obesity (BMI 30-39.9)  -     amitriptyline (ELAVIL) 25 MG tablet; Take 1 tablet (25 mg total) by mouth nightly as needed for Pain.  Dispense: 30 tablet; Refill: 3    Other orders  -     Influenza - High Dose (65+) (PF) (IM)          Follow-up in about 4 weeks (around 10/10/2018), or if symptoms worsen or fail to improve.

## 2018-09-13 ENCOUNTER — TELEPHONE (OUTPATIENT)
Dept: NEUROLOGY | Facility: CLINIC | Age: 68
End: 2018-09-13

## 2018-09-21 ENCOUNTER — TELEPHONE (OUTPATIENT)
Dept: NEUROLOGY | Facility: CLINIC | Age: 68
End: 2018-09-21

## 2018-09-25 RX ORDER — METFORMIN HYDROCHLORIDE 500 MG/1
TABLET, EXTENDED RELEASE ORAL
Qty: 180 TABLET | Refills: 0 | Status: SHIPPED | OUTPATIENT
Start: 2018-09-25 | End: 2018-10-03 | Stop reason: SDUPTHER

## 2018-09-26 ENCOUNTER — PROCEDURE VISIT (OUTPATIENT)
Dept: NEUROLOGY | Facility: CLINIC | Age: 68
End: 2018-09-26
Payer: MEDICARE

## 2018-09-26 DIAGNOSIS — R20.2 PARESTHESIA: ICD-10-CM

## 2018-09-26 DIAGNOSIS — R29.818 NEUROLOGICAL DEFICIT PRESENT: ICD-10-CM

## 2018-09-26 PROCEDURE — 95910 NRV CNDJ TEST 7-8 STUDIES: CPT | Mod: 26,S$PBB,, | Performed by: PSYCHIATRY & NEUROLOGY

## 2018-09-26 PROCEDURE — 95886 MUSC TEST DONE W/N TEST COMP: CPT | Mod: 26,S$PBB,, | Performed by: PSYCHIATRY & NEUROLOGY

## 2018-09-26 PROCEDURE — 95910 NRV CNDJ TEST 7-8 STUDIES: CPT | Mod: PBBFAC,PO | Performed by: PSYCHIATRY & NEUROLOGY

## 2018-09-26 PROCEDURE — 95886 MUSC TEST DONE W/N TEST COMP: CPT | Mod: PBBFAC,PO | Performed by: PSYCHIATRY & NEUROLOGY

## 2018-09-27 NOTE — PROCEDURES
[facility]  [address]  [place] [address]             Full Name: Tiana Bosch Gender: Female  Patient ID: [id number] YOB: 1950  History & Physical Exam: Patient with history of falls.  She has occasional numbness in her feet.  She has difficulty with leg weakness.  Evaluate for neuropathy.      Visit Date: 9/26/2018 15:40  Age: 68 Years 1 Months Old  Examining Physician: Regine Sheppard  Referring Physician: Quirino Lane  Height: 5 feet 2 inch      Sensory NCS      Nerve / Sites Rec. Site Onset Lat Peak Lat NP Amp PP Amp Segments Distance Velocity     ms ms µV µV  cm m/s   L Median - Digit II (Antidromic)      Wrist Dig III 3.02 3.85 36.2 59.9 Wrist - Dig III 14 46   L Ulnar - Digit V (Antidromic)      Wrist Dig V 1.93 2.60 19.2 26.3 Wrist - Dig V 14 73   L Sural - Ankle (Calf)      Calf Ankle NR NR NR NR Calf - Ankle 14 NR   L Superficial peroneal - Ankle      Lat leg Ankle 2.50 3.39 20.3 19.9 Lat leg - Ankle 14 56       Motor NCS      Nerve / Sites Muscle Latency Amplitude Amp % Duration Segments Distance Lat Diff Velocity     ms mV % ms  cm ms m/s   L Median - APB      Wrist APB 3.75 10.1 100 4.95 Wrist - APB 8        Elbow APB 7.71 9.3 92.6 5.68 Elbow - Wrist 22 3.96 56   L Ulnar - ADM      Wrist ADM 2.86 6.5 100 5.89 Wrist - ADM 8        B.Elbow ADM 6.51 6.6 100 6.30 B.Elbow - Wrist 20 3.65 55      A.Elbow ADM 8.80 6.4 97.9 7.14 A.Elbow - B.Elbow 12 2.29 52   L Peroneal - EDB      Ankle EDB 2.40 6.0 100 4.32 Ankle - EDB 8        Fib head EDB 9.22 5.3 88.5 4.58 Fib head - Ankle 31.5 6.82 46      Pop fossa EDB 11.35 5.1 85 4.69 Pop fossa - Fib head 10 2.14 47   L Tibial - AH      Ankle AH 2.76 8.6 100 5.26 Ankle - AH 8        Pop fossa AH 12.34 5.6 64.6 6.46 Pop fossa - Ankle 43 9.58 45       EMG         EMG Summary Table    Spontaneous Activity Recruitment Activation Duration Amplitude Polyphasia Comment    Ins Act Fibs Fasc - - - - - -   L. Tibialis anterior N 0 0  Normal Normal N N N Normal   L. Gastrocnemius (Medial head) N 0 0 Normal Normal N N N Normal   L. Extensor hallucis longus N 0 0 Normal Normal N N N Normal   L. Flexor digitorum longus N 0 0 Normal Normal N N N Normal   L. Vastus medialis N 0 0 Normal Normal N N N Normal   L. Tensor fasciae latae N 0 0 Normal Normal N N N Normal   L. First dorsal interosseous N 0 0 Normal Normal N N N Normal   L. Abductor pollicis brevis N 0 0 Normal Normal N N N Normal   L. Pronator teres N 0 0 Normal Normal N N N Normal   L. Biceps brachii N 0 0 Normal Normal N N N Normal   L. Triceps brachii N 0 0 Normal Normal N N N Normal   L. Deltoid N 0 0 Normal Normal N N N Normal   L. Cervical paraspinals  N 0 0      Normal   L. Lumbar paraspinals  N 0 0      Normal     Summary:  Nerve conduction studies were performed on the left upper and lower extremities. Left median sensory response was borderline prolonged with a normal amplitude.  Left ulnar sensory response was normal in amplitude and latency.  Left sural sensory response was absent. Left superficial peroneal sensory response was normal in amplitude and latency.  Left median motor response was normal in amplitude, latency and velocity.  Left ulnar motor response was borderline in amplitude with normal latency and velocity.  Left peroneal and tibial motor studies were normal in amplitude, latency and velocity.  Needle EMG was performed on the left upper and lower extremities as well as the cervical and lumbar paraspinal muscles.  No active denervation was present in the muscles tested.  Motor unit morphology and recruitment patterns were normal in every muscle tested.    Impression:  This is a normal EMG of the left upper and lower extremities.  There is no electrophysiologic evidence suggestive of a peripheral neuropathy, myopathy, or radiculopathy on this study.  The absence of the sural sensory response could be artifactual secondary to edema in the ankle, however this can be seen  with aging as well.  There is no definitive evidence of a focal neuropathy to explain this finding.  Clinical correlation is recommended.      Thank you very much for the referral. Please call if you have any questions regarding this study or the report.         --------------------------------------------------  Regine Sheppard DO, ABPN, AOBNP, ABANDREW

## 2018-10-02 ENCOUNTER — OFFICE VISIT (OUTPATIENT)
Dept: OPTOMETRY | Facility: CLINIC | Age: 68
End: 2018-10-02
Payer: MEDICARE

## 2018-10-02 DIAGNOSIS — H35.3132 INTERMEDIATE STAGE NONEXUDATIVE AGE-RELATED MACULAR DEGENERATION OF BOTH EYES: ICD-10-CM

## 2018-10-02 DIAGNOSIS — E11.9 DIABETES MELLITUS TYPE 2 WITHOUT RETINOPATHY: Primary | ICD-10-CM

## 2018-10-02 DIAGNOSIS — H43.813 POSTERIOR VITREOUS DETACHMENT, BILATERAL: ICD-10-CM

## 2018-10-02 DIAGNOSIS — Z13.5 GLAUCOMA SCREENING: ICD-10-CM

## 2018-10-02 DIAGNOSIS — Z96.1 BILATERAL PSEUDOPHAKIA: ICD-10-CM

## 2018-10-02 PROCEDURE — 92134 CPTRZ OPH DX IMG PST SGM RTA: CPT | Mod: PBBFAC,PO | Performed by: OPTOMETRIST

## 2018-10-02 PROCEDURE — 99214 OFFICE O/P EST MOD 30 MIN: CPT | Mod: PBBFAC,PO | Performed by: OPTOMETRIST

## 2018-10-02 PROCEDURE — 99999 PR PBB SHADOW E&M-EST. PATIENT-LVL IV: CPT | Mod: PBBFAC,,, | Performed by: OPTOMETRIST

## 2018-10-02 PROCEDURE — 92014 COMPRE OPH EXAM EST PT 1/>: CPT | Mod: S$PBB,,, | Performed by: OPTOMETRIST

## 2018-10-02 NOTE — PATIENT INSTRUCTIONS
"Using the Amsler Grid  If you are at risk for vision loss, you may be told to check your eyesight regularly using the Amsler grid. Below is the grid and instructions for using it.         The Amsler grid helps you track changes in your vision.    How to Use the Amsler Grid  1. Use the grid in a well-lighted area.  2. Wear glasses or contact lenses if you usually wear them.  3. Hold the grid at your normal reading distance (about 16 inches).  4. Cover your left eye.  5. With your right eye, look at the dot in the center of the grid.  6. While looking at the dot, notice if any of the lines look wavy, if any lines disappear, or if the boxes change shape.  7. Write down on a piece of paper any vision changes from the last time you used the grid.  8. Now repeat the exercise, this time covering your right eye.  9. Call your doctor right away if you notice any vision changes.  How Often Should I Check My Vision?  Use the Amsler grid as often as your eye doctor suggests. Keep the grid where youll remember to use it. Call your eye doctor right away if you notice any changes with your eyesight. This includes if your vision improves.  © 3186-9086 [facility], [address] [place] [address], [place] [address]. All rights reserved. This information is not intended as a substitute for professional medical care. Always follow your healthcare professional's instructions.DRY EYES:  Use Over The Counter artificial tears as needed for dry eye symptoms.  Some common brands include:  Systane, Optive, and Refresh.  These drops can be used as frequently as desired, but may be most helpful use during long periods of concentrated work.  For example, reading / working at the computer.  Avoid drops that "get redness out", as these contain medication that may further irritate the eyes.    ALLERGY EYES / SYMPTOMS:    Over the counter medications include--Zaditor and Alaway  Use as directed 1-2 drops daily for symptoms of itching / watering " eyes.  These drops will not help for dry eye or exposure symptoms.    FLASHES / FLOATERS / POSTERIOR VITREOUS DETACHMENT    Call the clinic if you have any further changes in symptoms.  Including:  Increased numbers of floaters or flashing lights, dimness or darkness that moves through or stays constant in your vision, or any pain in the eye (s).

## 2018-10-02 NOTE — PROGRESS NOTES
HPI     Annual Exam      Additional comments: DLE 9-17 (david)   ocular health exam               Diabetic Eye Exam      Additional comments: BSL running high recently due to steroids po              Blurred Vision      Additional comments: at both near & distance // skipping letters when   reading & looks wavy              Dry Eye      Additional comments: +Systane gtts OU qam -- more if needed              Photophobia      Additional comments: sensitive to sunlight              Spots and/or Floaters      Additional comments: OU --- no light flashes              Eye Strain      Additional comments: OS >> OD              Comments     Hemoglobin A1C       Date                     Value               Ref Range             Status                06/10/2018               7.2 (H)             0.0 - 5.6 %           Final              Comment:    Reference Interval:  5.0 - 5.6 Normal   5.7 - 6.4 High Risk   > 6.5   Diabetic    Hgb A1c results are standardized based on the (NGSP) National     Glycohemoglobin Standardization Program.    Hemoglobin A1C levels are   related to mean serum/plasma glucose   during the preceding 2-3 months.              04/03/2018               7.8 (H)             4.0 - 5.6 %           Final              Comment:    According to ADA guidelines, hemoglobin A1c <7.0% represents  optimal   control in non-pregnant diabetic patients. Different  metrics may apply to   specific patient populations.   Standards of Medical Care in   Diabetes-2016.  For the purpose of screening for the presence of   diabetes:  <5.7%     Consistent with the absence of diabetes  5.7-6.4%    Consistent with increasing risk for diabetes   (prediabetes)  >or=6.5%    Consistent with diabetes  Currently, no consensus exists for use of   hemoglobin A1c  for diagnosis of diabetes for children.  This Hemoglobin   A1c assay has significant interference with fetal   hemoglobin   (HbF).   The results are invalid for patients with  abnormal amounts of   HbF,     including those with known Hereditary Persistence   of Fetal Hemoglobin.   Heterozygous hemoglobin variants (HbAS, HbAC,   HbAD, HbAE, HbA2) do not   significantly interfere with this assay;   however, presence of multiple   variants in a sample may impact the %   interference.         08/09/2017               7.7 (H)             4.0 - 5.6 %           Final              Comment:    According to ADA guidelines, hemoglobin A1c <7.0% represents  optimal   control in non-pregnant diabetic patients. Different  metrics may apply to   specific patient populations.   Standards of Medical Care in   Diabetes-2016.  For the purpose of screening for the presence of   diabetes:  <5.7%     Consistent with the absence of diabetes  5.7-6.4%    Consistent with increasing risk for diabetes   (prediabetes)  >or=6.5%    Consistent with diabetes  Currently, no consensus exists for use of   hemoglobin A1c  for diagnosis of diabetes for children.  This Hemoglobin   A1c assay has significant interference with fetal   hemoglobin   (HbF).   The results are invalid for patients with abnormal amounts of   HbF,     including those with known Hereditary Persistence   of Fetal Hemoglobin.   Heterozygous hemoglobin variants (HbAS, HbAC,   HbAD, HbAE, HbA2) do not   significantly interfere with this assay;   however, presence of multiple   variants in a sample may impact the %   interference.    ----------            Last edited by Michelle Bose on 10/2/2018  3:30 PM. (History)        ROS     Positive for: Eyes    Negative for: Constitutional, Gastrointestinal, Neurological, Skin,   Genitourinary, Musculoskeletal, HENT, Endocrine, Cardiovascular,   Respiratory, Psychiatric, Allergic/Imm, Heme/Lymph    Last edited by LO Jaramillo OD on 10/2/2018  3:48 PM. (History)        Assessment /Plan     For exam results, see Encounter Report.    Diabetes mellitus type 2 without retinopathy    Intermediate stage  nonexudative age-related macular degeneration of both eyes  -     Posterior Segment OCT Retina-Both eyes    Posterior vitreous detachment, bilateral    Glaucoma screening    Bilateral pseudophakia      1. No gross retinopathy / no jon / no csme    PCIOL OU 2013 BY DR. RONQUILLO  DM x 30 Yrs.  S/P AVASTIN OU X BY DR. LIMA  Choroidal Nevus OD    2. H/o avastin for wet amd / dme?  In past---stable since 2016  OCT updated today  Loss of functional IA layers OU  No current heme/ srf    Monitor annually  3. RD precautions given, reviewed  4. Not suspect  5. Stable OU    NC refraction today  Advised that changing conventional glasses would not improve VA, and that a low vision eval may be beneficial  Scheduled w/ Dr Goodman      Discussed and educated patient on current findings /plan.  RTC 1 year, prn if any changes / issues

## 2018-10-03 ENCOUNTER — LAB VISIT (OUTPATIENT)
Dept: LAB | Facility: HOSPITAL | Age: 68
End: 2018-10-03
Attending: NURSE PRACTITIONER
Payer: MEDICARE

## 2018-10-03 ENCOUNTER — OFFICE VISIT (OUTPATIENT)
Dept: PRIMARY CARE CLINIC | Facility: CLINIC | Age: 68
End: 2018-10-03
Payer: MEDICARE

## 2018-10-03 VITALS
HEIGHT: 62 IN | DIASTOLIC BLOOD PRESSURE: 72 MMHG | WEIGHT: 169.44 LBS | BODY MASS INDEX: 31.18 KG/M2 | SYSTOLIC BLOOD PRESSURE: 112 MMHG | HEART RATE: 88 BPM

## 2018-10-03 DIAGNOSIS — I77.6 VASCULITIS: Primary | ICD-10-CM

## 2018-10-03 DIAGNOSIS — I77.6 VASCULITIS: ICD-10-CM

## 2018-10-03 LAB
ALBUMIN SERPL BCP-MCNC: 3.9 G/DL
ALP SERPL-CCNC: 144 U/L
ALT SERPL W/O P-5'-P-CCNC: 22 U/L
ANION GAP SERPL CALC-SCNC: 11 MMOL/L
AST SERPL-CCNC: 23 U/L
BASOPHILS # BLD AUTO: 0.05 K/UL
BASOPHILS NFR BLD: 0.6 %
BILIRUB SERPL-MCNC: 0.3 MG/DL
BUN SERPL-MCNC: 23 MG/DL
CALCIUM SERPL-MCNC: 10.2 MG/DL
CHLORIDE SERPL-SCNC: 102 MMOL/L
CO2 SERPL-SCNC: 24 MMOL/L
CREAT SERPL-MCNC: 1.3 MG/DL
DIFFERENTIAL METHOD: ABNORMAL
EOSINOPHIL # BLD AUTO: 0.2 K/UL
EOSINOPHIL NFR BLD: 2.3 %
ERYTHROCYTE [DISTWIDTH] IN BLOOD BY AUTOMATED COUNT: 13.2 %
EST. GFR  (AFRICAN AMERICAN): 49.4 ML/MIN/1.73 M^2
EST. GFR  (NON AFRICAN AMERICAN): 42.9 ML/MIN/1.73 M^2
GLUCOSE SERPL-MCNC: 112 MG/DL
HCT VFR BLD AUTO: 39.3 %
HGB BLD-MCNC: 12.7 G/DL
IMM GRANULOCYTES # BLD AUTO: 0.03 K/UL
IMM GRANULOCYTES NFR BLD AUTO: 0.4 %
LYMPHOCYTES # BLD AUTO: 2.2 K/UL
LYMPHOCYTES NFR BLD: 27.5 %
MCH RBC QN AUTO: 31.3 PG
MCHC RBC AUTO-ENTMCNC: 32.3 G/DL
MCV RBC AUTO: 97 FL
MONOCYTES # BLD AUTO: 1.1 K/UL
MONOCYTES NFR BLD: 13.3 %
NEUTROPHILS # BLD AUTO: 4.4 K/UL
NEUTROPHILS NFR BLD: 55.9 %
NRBC BLD-RTO: 0 /100 WBC
PLATELET # BLD AUTO: 342 K/UL
PMV BLD AUTO: 9.7 FL
POTASSIUM SERPL-SCNC: 4.7 MMOL/L
PROT SERPL-MCNC: 7.7 G/DL
RBC # BLD AUTO: 4.06 M/UL
SODIUM SERPL-SCNC: 137 MMOL/L
WBC # BLD AUTO: 7.89 K/UL

## 2018-10-03 PROCEDURE — 99999 PR PBB SHADOW E&M-EST. PATIENT-LVL V: CPT | Mod: PBBFAC,,, | Performed by: NURSE PRACTITIONER

## 2018-10-03 PROCEDURE — 80053 COMPREHEN METABOLIC PANEL: CPT

## 2018-10-03 PROCEDURE — 99214 OFFICE O/P EST MOD 30 MIN: CPT | Mod: S$PBB,,, | Performed by: NURSE PRACTITIONER

## 2018-10-03 PROCEDURE — 85025 COMPLETE CBC W/AUTO DIFF WBC: CPT

## 2018-10-03 PROCEDURE — 36415 COLL VENOUS BLD VENIPUNCTURE: CPT | Mod: PO

## 2018-10-03 PROCEDURE — 99215 OFFICE O/P EST HI 40 MIN: CPT | Mod: PBBFAC,PO | Performed by: NURSE PRACTITIONER

## 2018-10-03 RX ORDER — AZITHROMYCIN 250 MG/1
250 TABLET, FILM COATED ORAL DAILY
COMMUNITY
End: 2018-10-12 | Stop reason: ALTCHOICE

## 2018-10-03 NOTE — PROGRESS NOTES
Subjective:       Patient ID: Tiana Bosch is a 68 y.o. female.     Chief Complaint: Rash (both legs )     Rash   This is a chronic problem. The current episode started more than 1 month ago. The problem is unchanged. The affected locations include the left foot, right foot, left ankle and right ankle. Associated symptoms none. Pertinent negatives include no fever or joint pain, no itching. Treatments tried: lotrimin and blue emu. The treatment provided no relief. (She has Lichen planus but this is not similar.)   When questioned about wearing tight shoes or sandals, she denies this.     Review of Systems   Constitutional: Negative for activity change, appetite change and fever.   Respiratory: Positive for shortness of breath (chronic).    Cardiovascular: Negative for chest pain.   Musculoskeletal: Negative for joint pain.   Skin: Positive for rash. Negative for color change, pallor and wound.       Objective:   Physical Exam   Constitutional: She appears well-developed and well-nourished.   Cardiovascular: Normal rate and regular rhythm.  Bilat LE edema.  Pulmonary/Chest: Breath sounds normal.   Pt is on home O2 3L with compressor d/t COPD     Skin: Skin is warm and dry. Capillary refill takes more than 3 seconds.   Rash tiny, erythematous, nonblanching, nonpruritic macules in single and confluent pattern noted over the dorsum of the feet and around the lower feet in a band-like distribution.  These 2 make a sandal-like distribution pattern.  There are scattered lesions on the lower legs as well.    Vasculitis  -     Comprehensive metabolic panel; Future; Expected date: 10/03/2018  -     CBC auto differential; Future; Expected date: 10/03/2018  -     Cancel: Urine Dip with micro, POC  -     URINALYSIS; Future; Expected date: 10/03/2018  -     Urinalysis Microscopic; Future; Expected date: 10/03/2018      Pt today presents with a nonpruritic rash on her lower legs and feet for the past month.  She has  nonblanching macules on both of her feet with a band across the dorsum and one that comes around the back of the heel in a sandal-like distribution..    This is a new problem to me and the following work up is planned.    Lab & Radiological Tests Ordered: urinalysis, blood work above.  The results are pending.    Pt advised to perform comfort measures recommended on patient instruction sheet .    Recommendations will be based on diagnostic test outcomes.  Explained exam findings, diagnosis and treatment plan to patient and her .  Questions answered and patient states understanding.

## 2018-10-03 NOTE — MEDICAL/APP STUDENT
Subjective:       Patient ID: Tiana Bosch is a 68 y.o. female.    Chief Complaint: Rash (both legs )    Rash   This is a chronic problem. The current episode started more than 1 month ago. The problem is unchanged. The affected locations include the left foot, right foot, left ankle and right ankle. Associated symptoms include shortness of breath. Pertinent negatives include no fever or joint pain. Treatments tried: lotrimin and blue emu. The treatment provided no relief. (Lichen planis)     Review of Systems   Constitutional: Negative for activity change, appetite change and fever.   Respiratory: Positive for shortness of breath.    Cardiovascular: Negative for chest pain.   Musculoskeletal: Negative for joint pain.   Skin: Positive for rash. Negative for color change, pallor and wound.       Objective:      Physical Exam   Constitutional: She appears well-developed and well-nourished.   Cardiovascular: Normal rate and regular rhythm.   Pulmonary/Chest: Breath sounds normal.   Pt is on home O2 3L with compressor d/t COPD     Skin: Skin is warm and dry. Capillary refill takes more than 3 seconds. Rash noted.       Assessment:       No diagnosis found.    Plan:       Vasculitis  -     Comprehensive metabolic panel; Future; Expected date: 10/03/2018  -     CBC auto differential; Future; Expected date: 10/03/2018  -     Cancel: Urine Dip with micro, POC  -     URINALYSIS; Future; Expected date: 10/03/2018  -     Urinalysis Microscopic; Future; Expected date: 10/03/2018

## 2018-10-03 NOTE — Clinical Note
LINH Malone MD,  I saw your patient today in the [facility].  If you have any questions, please do not hesitate to contact me.  Thank you!  ADELFO Talamantes

## 2018-10-03 NOTE — PATIENT INSTRUCTIONS
The red spots are inflamed tiny blood vessels.  It may not be anything serious but we want to check a flew things out to be sure.  Please leave a urine specimen and get the blood work.  We'll call with the results.   Follow up with Dr. Malone/BILLY Childs in the next couple of weeks for further evaluation if it doesn't resolve, sooner if it's worse.    If you have any questions, please call.  You can reach us at [phone] Monday through Thursday (except holidays) 10 a.m. to 6 p.m. or call Dr. Malone/BILLY Childs    Thank you for using the [facility]!    BARB Talamantes, CNP, FNP-BC  [facility]    To rate your experience with ADELFO Talamantes, click on the link below:        [email or web address]

## 2018-10-05 ENCOUNTER — TELEPHONE (OUTPATIENT)
Dept: NEUROLOGY | Facility: CLINIC | Age: 68
End: 2018-10-05

## 2018-10-05 NOTE — TELEPHONE ENCOUNTER
Will you please forward a copy of the EMG report to Dr Lane for review as we are unable to view in the system. Thank you.

## 2018-10-12 ENCOUNTER — OFFICE VISIT (OUTPATIENT)
Dept: FAMILY MEDICINE | Facility: CLINIC | Age: 68
End: 2018-10-12
Payer: MEDICARE

## 2018-10-12 ENCOUNTER — LAB VISIT (OUTPATIENT)
Dept: LAB | Facility: HOSPITAL | Age: 68
End: 2018-10-12
Attending: FAMILY MEDICINE
Payer: MEDICARE

## 2018-10-12 VITALS
WEIGHT: 168.19 LBS | OXYGEN SATURATION: 94 % | TEMPERATURE: 98 F | HEIGHT: 62 IN | BODY MASS INDEX: 30.95 KG/M2 | SYSTOLIC BLOOD PRESSURE: 138 MMHG | DIASTOLIC BLOOD PRESSURE: 70 MMHG | HEART RATE: 88 BPM

## 2018-10-12 DIAGNOSIS — F32.89 OTHER DEPRESSION: ICD-10-CM

## 2018-10-12 DIAGNOSIS — Z01.419 WOMEN'S ANNUAL ROUTINE GYNECOLOGICAL EXAMINATION: ICD-10-CM

## 2018-10-12 DIAGNOSIS — Z99.81 ON HOME OXYGEN THERAPY: ICD-10-CM

## 2018-10-12 DIAGNOSIS — E11.42 TYPE 2 DIABETES MELLITUS WITH DIABETIC POLYNEUROPATHY, WITHOUT LONG-TERM CURRENT USE OF INSULIN: ICD-10-CM

## 2018-10-12 DIAGNOSIS — I10 ESSENTIAL HYPERTENSION: Primary | Chronic | ICD-10-CM

## 2018-10-12 DIAGNOSIS — F41.9 ANXIETY: ICD-10-CM

## 2018-10-12 PROBLEM — F32.A DEPRESSION: Status: ACTIVE | Noted: 2018-10-12

## 2018-10-12 LAB
ESTIMATED AVG GLUCOSE: 163 MG/DL
HBA1C MFR BLD HPLC: 7.3 %

## 2018-10-12 PROCEDURE — 36415 COLL VENOUS BLD VENIPUNCTURE: CPT | Mod: PO

## 2018-10-12 PROCEDURE — 99213 OFFICE O/P EST LOW 20 MIN: CPT | Mod: S$PBB,,, | Performed by: NURSE PRACTITIONER

## 2018-10-12 PROCEDURE — 99999 PR PBB SHADOW E&M-EST. PATIENT-LVL V: CPT | Mod: PBBFAC,,, | Performed by: NURSE PRACTITIONER

## 2018-10-12 PROCEDURE — 83036 HEMOGLOBIN GLYCOSYLATED A1C: CPT

## 2018-10-12 PROCEDURE — 99215 OFFICE O/P EST HI 40 MIN: CPT | Mod: PBBFAC,PO | Performed by: NURSE PRACTITIONER

## 2018-10-17 ENCOUNTER — TELEPHONE (OUTPATIENT)
Dept: CARDIOLOGY | Facility: CLINIC | Age: 68
End: 2018-10-17

## 2018-10-17 DIAGNOSIS — R06.02 SOB (SHORTNESS OF BREATH): ICD-10-CM

## 2018-10-17 DIAGNOSIS — I10 HYPERTENSION, UNSPECIFIED TYPE: Primary | ICD-10-CM

## 2018-10-17 DIAGNOSIS — I27.20 PULMONARY HTN: Primary | ICD-10-CM

## 2018-10-17 DIAGNOSIS — I25.10 CORONARY ARTERY DISEASE, ANGINA PRESENCE UNSPECIFIED, UNSPECIFIED VESSEL OR LESION TYPE, UNSPECIFIED WHETHER NATIVE OR TRANSPLANTED HEART: ICD-10-CM

## 2018-10-17 DIAGNOSIS — I27.20 PULMONARY HTN: ICD-10-CM

## 2018-10-18 ENCOUNTER — OFFICE VISIT (OUTPATIENT)
Dept: PODIATRY | Facility: CLINIC | Age: 68
End: 2018-10-18
Payer: MEDICARE

## 2018-10-18 VITALS — WEIGHT: 168.19 LBS | HEIGHT: 62 IN | BODY MASS INDEX: 30.95 KG/M2

## 2018-10-18 DIAGNOSIS — E11.42 TYPE 2 DIABETES MELLITUS WITH DIABETIC POLYNEUROPATHY, WITHOUT LONG-TERM CURRENT USE OF INSULIN: ICD-10-CM

## 2018-10-18 DIAGNOSIS — B35.1 ONYCHOMYCOSIS DUE TO DERMATOPHYTE: Primary | ICD-10-CM

## 2018-10-18 PROCEDURE — 99212 OFFICE O/P EST SF 10 MIN: CPT | Mod: PBBFAC,PN | Performed by: PODIATRIST

## 2018-10-18 PROCEDURE — 11721 DEBRIDE NAIL 6 OR MORE: CPT | Mod: PBBFAC,PN | Performed by: PODIATRIST

## 2018-10-18 PROCEDURE — 11721 DEBRIDE NAIL 6 OR MORE: CPT | Mod: S$PBB,Q9,, | Performed by: PODIATRIST

## 2018-10-18 PROCEDURE — 99203 OFFICE O/P NEW LOW 30 MIN: CPT | Mod: 25,S$PBB,, | Performed by: PODIATRIST

## 2018-10-18 PROCEDURE — 99999 PR PBB SHADOW E&M-EST. PATIENT-LVL II: CPT | Mod: PBBFAC,,, | Performed by: PODIATRIST

## 2018-10-18 NOTE — LETTER
October 19, 2018      Talita Childs DNP, APRN  [address] [facility] [address] [place] [address]           [facility]  [address] [facility] [address] [place] [address]  Phone: [phone]          Patient: Tiana Bosch   MR Number: [id number]   YOB: 1950   Date of Visit: 10/18/2018       Dear Talita Childs:    Thank you for referring Tiana Bosch to me for evaluation. Attached you will find relevant portions of my assessment and plan of care.    If you have questions, please do not hesitate to call me. I look forward to following Tiana Bosch along with you.    Sincerely,    Maverick Olivera, ISAURA    Enclosure  CC:  No Recipients    If you would like to receive this communication electronically, please contact [email or web address] or [phone] to request more information on [facility] access.    For providers and/or their staff who would like to refer a patient to [facility], please contact us through our one-stop-shop provider referral line, [facility], at [phone].    If you feel you have received this communication in error or would no longer like to receive these types of communications, please e-mail [email or web address]
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
## 2018-10-25 PROBLEM — R06.02 SOB (SHORTNESS OF BREATH): Status: ACTIVE | Noted: 2018-10-25

## 2018-10-29 ENCOUNTER — PATIENT MESSAGE (OUTPATIENT)
Dept: FAMILY MEDICINE | Facility: CLINIC | Age: 68
End: 2018-10-29

## 2018-10-30 ENCOUNTER — NURSE TRIAGE (OUTPATIENT)
Dept: ADMINISTRATIVE | Facility: CLINIC | Age: 68
End: 2018-10-30

## 2018-10-30 ENCOUNTER — TELEPHONE (OUTPATIENT)
Dept: CARDIOLOGY | Facility: CLINIC | Age: 68
End: 2018-10-30

## 2018-10-30 RX ORDER — PANTOPRAZOLE SODIUM 40 MG/1
40 TABLET, DELAYED RELEASE ORAL DAILY
Qty: 90 TABLET | Refills: 0 | Status: SHIPPED | OUTPATIENT
Start: 2018-10-30 | End: 2018-11-06 | Stop reason: SDUPTHER
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
## 2018-10-30 NOTE — TELEPHONE ENCOUNTER
Reason for Disposition   Chest pain lasting longer than 5 minutes and ANY of the following:* Over 50 years old* Over 30 years old and at least one cardiac risk factor (i.e., high blood pressure, diabetes, high cholesterol, obesity, smoker or strong family history of heart disease)* Pain is crushing, pressure-like, or heavy * Took nitroglycerin and chest pain was not relieved* History of heart disease (i.e., angina, heart attack, bypass surgery, angioplasty, CHF)    Protocols used:  CHEST PAIN-A-OH    Ms. Bosch states she took a nitroglycerin for her heartburn, She states discomfort is still present. Patient wanted to know what did the symptom meant? Patient advised to call 911 for further evaluation of symptom.

## 2018-10-30 NOTE — TELEPHONE ENCOUNTER
----- Message from Amelie Montanez sent at 10/30/2018  8:18 AM CDT -----  Contact: self  Type: Needs Medical Advice    Who Called:  self  Symptoms (please be specific):  Feeling like she has indigestion but not sure   How long has patient had these symptoms:  Since 2:00 this morning  Best Call Back Number: [phone]  Additional Information: patient took a nitro pill and it eased it up a little but not completely. Please call patient. Thanks!

## 2018-11-06 ENCOUNTER — OFFICE VISIT (OUTPATIENT)
Dept: FAMILY MEDICINE | Facility: CLINIC | Age: 68
End: 2018-11-06
Payer: MEDICARE

## 2018-11-06 VITALS
RESPIRATION RATE: 18 BRPM | HEIGHT: 62 IN | BODY MASS INDEX: 30.71 KG/M2 | DIASTOLIC BLOOD PRESSURE: 64 MMHG | HEART RATE: 88 BPM | SYSTOLIC BLOOD PRESSURE: 132 MMHG | WEIGHT: 166.88 LBS

## 2018-11-06 DIAGNOSIS — E11.42 TYPE 2 DIABETES MELLITUS WITH DIABETIC POLYNEUROPATHY, WITHOUT LONG-TERM CURRENT USE OF INSULIN: Primary | ICD-10-CM

## 2018-11-06 DIAGNOSIS — F41.9 ANXIETY: ICD-10-CM

## 2018-11-06 DIAGNOSIS — Z12.11 SCREEN FOR COLON CANCER: ICD-10-CM

## 2018-11-06 DIAGNOSIS — I10 ESSENTIAL HYPERTENSION: Chronic | ICD-10-CM

## 2018-11-06 DIAGNOSIS — F32.89 OTHER DEPRESSION: ICD-10-CM

## 2018-11-06 DIAGNOSIS — E78.5 HYPERLIPIDEMIA LDL GOAL <70: ICD-10-CM

## 2018-11-06 DIAGNOSIS — Z78.0 POSTMENOPAUSAL: ICD-10-CM

## 2018-11-06 DIAGNOSIS — Z99.81 ON HOME OXYGEN THERAPY: ICD-10-CM

## 2018-11-06 PROBLEM — R06.02 SOB (SHORTNESS OF BREATH): Status: RESOLVED | Noted: 2018-10-25 | Resolved: 2018-11-06

## 2018-11-06 PROCEDURE — 99213 OFFICE O/P EST LOW 20 MIN: CPT | Mod: PBBFAC,PO | Performed by: FAMILY MEDICINE

## 2018-11-06 PROCEDURE — 99999 PR PBB SHADOW E&M-EST. PATIENT-LVL III: CPT | Mod: PBBFAC,,, | Performed by: FAMILY MEDICINE

## 2018-11-06 PROCEDURE — 99214 OFFICE O/P EST MOD 30 MIN: CPT | Mod: S$PBB,,, | Performed by: FAMILY MEDICINE

## 2018-11-06 RX ORDER — FLUOXETINE 10 MG/1
10 TABLET ORAL DAILY
Qty: 30 TABLET | Refills: 5 | Status: SHIPPED | OUTPATIENT
Start: 2018-11-06 | End: 2018-01-01

## 2018-11-06 RX ORDER — PANTOPRAZOLE SODIUM 40 MG/1
40 TABLET, DELAYED RELEASE ORAL DAILY
Qty: 90 TABLET | Refills: 0 | Status: SHIPPED | OUTPATIENT
Start: 2018-11-06 | End: 2018-01-01 | Stop reason: SDUPTHER

## 2018-11-06 RX ORDER — AZITHROMYCIN 250 MG/1
TABLET, FILM COATED ORAL
Status: ON HOLD | COMMUNITY
Start: 2018-11-02 | End: 2018-01-01 | Stop reason: HOSPADM

## 2018-11-06 NOTE — PROGRESS NOTES
Subjective:       Patient ID: Tiana Bosch is a 68 y.o. female.    Chief Complaint: Diabetes (Patient here for 4 month follow up) and Anxiety    Here for f/u htn, dm II and chronic medical issues. Doing well overall but needs to address anxiety meds.      Hypertension   This is a chronic problem. The current episode started more than 1 year ago. The problem is controlled. Pertinent negatives include no chest pain, palpitations or shortness of breath.   Diabetes   She presents for her follow-up diabetic visit. She has type 2 diabetes mellitus. Her disease course has been stable. Pertinent negatives for hypoglycemia include no nervousness/anxiousness. Pertinent negatives for diabetes include no chest pain and no fatigue.     Review of Systems   Constitutional: Negative for chills, fatigue and fever.   Respiratory: Negative for cough, chest tightness and shortness of breath.    Cardiovascular: Negative for chest pain, palpitations and leg swelling.   Gastrointestinal: Negative for abdominal distention and abdominal pain.   Endocrine: Negative for cold intolerance and heat intolerance.   Skin: Negative for rash.   Psychiatric/Behavioral: Negative for dysphoric mood. The patient is not nervous/anxious.        Objective:      Physical Exam   Constitutional: She appears well-developed and well-nourished.   HENT:   Head: Normocephalic and atraumatic.   Cardiovascular: Normal rate, regular rhythm and normal heart sounds.   Pulmonary/Chest: Effort normal and breath sounds normal.   Psychiatric: She has a normal mood and affect.   Nursing note and vitals reviewed.      Assessment:       1. Type 2 diabetes mellitus with diabetic polyneuropathy, without long-term current use of insulin    2. Hyperlipidemia LDL goal <70    3. Essential hypertension    4. On home oxygen therapy    5. Anxiety    6. Other depression    7. Postmenopausal    8. Screen for colon cancer        Plan:       Type 2 diabetes mellitus with diabetic  polyneuropathy, without long-term current use of insulin    Hyperlipidemia LDL goal <70    Essential hypertension    On home oxygen therapy    Anxiety    Other depression    Postmenopausal  -     DXA Bone Density Spine And Hip; Future; Expected date: 11/06/2018    Screen for colon cancer  -     Fecal Immunochemical Test (iFOBT); Future; Expected date: 11/06/2018    Other orders  -     FLUoxetine 10 MG Tab; Take 1 tablet (10 mg total) by mouth once daily.  Dispense: 30 tablet; Refill: 5      Will monitor chronic medical issues and continue current plan of care.  F/u with pulmonary as planned.  Trial of med and call with update; she would like to not have visit due to fear of flu and such.  [employer]Kit was given to patient on 11/6/2018 2:20 PM       Follow-up in about 4 months (around 3/6/2019), or if symptoms worsen or fail to improve.

## 2018-11-13 ENCOUNTER — OFFICE VISIT (OUTPATIENT)
Dept: CARDIOLOGY | Facility: CLINIC | Age: 68
End: 2018-11-13
Payer: MEDICARE

## 2018-11-13 VITALS
WEIGHT: 169.31 LBS | HEART RATE: 89 BPM | HEIGHT: 62 IN | DIASTOLIC BLOOD PRESSURE: 68 MMHG | BODY MASS INDEX: 31.16 KG/M2 | SYSTOLIC BLOOD PRESSURE: 125 MMHG

## 2018-11-13 DIAGNOSIS — I25.10 CORONARY ARTERY DISEASE INVOLVING NATIVE CORONARY ARTERY WITHOUT ANGINA PECTORIS, UNSPECIFIED WHETHER NATIVE OR TRANSPLANTED HEART: ICD-10-CM

## 2018-11-13 DIAGNOSIS — E78.5 HYPERLIPIDEMIA LDL GOAL <70: ICD-10-CM

## 2018-11-13 DIAGNOSIS — I10 ESSENTIAL HYPERTENSION: ICD-10-CM

## 2018-11-13 DIAGNOSIS — J44.9 COPD, SEVERE: ICD-10-CM

## 2018-11-13 DIAGNOSIS — I27.21 PAH (PULMONARY ARTERY HYPERTENSION): Primary | ICD-10-CM

## 2018-11-13 PROCEDURE — 99999 PR PBB SHADOW E&M-EST. PATIENT-LVL III: CPT | Mod: PBBFAC,,, | Performed by: INTERNAL MEDICINE

## 2018-11-13 PROCEDURE — 99214 OFFICE O/P EST MOD 30 MIN: CPT | Mod: S$PBB,,, | Performed by: INTERNAL MEDICINE

## 2018-11-13 PROCEDURE — 99213 OFFICE O/P EST LOW 20 MIN: CPT | Mod: PBBFAC,PO | Performed by: INTERNAL MEDICINE

## 2018-11-13 RX ORDER — METOPROLOL TARTRATE 25 MG/1
25 TABLET, FILM COATED ORAL 2 TIMES DAILY
Qty: 180 TABLET | Refills: 3 | Status: SHIPPED | OUTPATIENT
Start: 2018-11-13 | End: 2019-01-01 | Stop reason: SDUPTHER

## 2018-11-13 NOTE — PROGRESS NOTES
Subjective:    Patient ID:  Tiana Bosch is a 68 y.o. female who presents for follow-up of Follow-up      HPI    ROS     Objective:      Physical Exam      Assessment:       1. Essential hypertension    2. Coronary artery disease involving native coronary artery without angina pectoris, unspecified whether native or transplanted heart         Plan:       Essential hypertension  -     metoprolol tartrate (LOPRESSOR) 25 MG tablet; Take 1 tablet (25 mg total) by mouth 2 (two) times daily.  Dispense: 180 tablet; Refill: 3    Coronary artery disease involving native coronary artery without angina pectoris, unspecified whether native or transplanted heart  -     metoprolol tartrate (LOPRESSOR) 25 MG tablet; Take 1 tablet (25 mg total) by mouth 2 (two) times daily.  Dispense: 180 tablet; Refill: 3    Advised to try move her legs and arms as a way of exercising that is so limited for her. Very stable

## 2018-11-18 PROBLEM — J96.21 ACUTE ON CHRONIC RESPIRATORY FAILURE WITH HYPOXEMIA: Status: ACTIVE | Noted: 2018-01-01

## 2018-11-18 PROBLEM — J44.1 COPD EXACERBATION: Status: ACTIVE | Noted: 2018-01-01

## 2018-12-20 NOTE — PROGRESS NOTES
The following patient has been assigned to Renetta Gutierrez RN with [facility] for high risk screening.    Reason: High Risk  Report    Please contact OPCM at [phone] with any questions.    Thank you,    LISHA Bernal

## 2018-12-20 NOTE — PROGRESS NOTES
"Subjective:       Patient ID: Tiana Bosch is a 68 y.o. female.    Chief Complaint: Medication Refill (Patient here follow up following stopping Prozac) and Follow-up  She was last seen in primary care by Faisal on 10/06/2018.  I last saw her on 10/12/62652.  She is accompanied by her .  HPI   She is her today to discuss Prozac.  states dizziness increased and stopped Prozac about one week ago and still has a little dizziness as usual. States dizziness was worse with Prozac. States "feels like draining from head to feet" but she does not loose conscious or have any syncopal episode.   She was seen in ED for chest pain on 12/17/2018 and was prescribed sucralfate but states cannot swallow when trying to dissolve and take as instructed in ED. States it has helped but just cannot tolerate.  Vitals:    12/20/18 0930   BP: 126/68   Pulse: 83   Resp: 18   Temp: 98.5 °F (36.9 °C)     BP Readings from Last 3 Encounters:   12/20/18 126/68   12/17/18 (!) 136/47   11/20/18 (!) 150/76     Review of Systems    States she does recognized that she has depression during "this time of year and this year has been a lot of change". States her lack of independence and need to get rid of cat has impacted her emotions.   Objective:      Physical Exam   Constitutional: Vital signs are normal. She appears well-developed and well-nourished. She is active and cooperative.   She is using oxygen and in wheelchair during this visit   HENT:   Head: Normocephalic and atraumatic.   Right Ear: Hearing, tympanic membrane, external ear and ear canal normal.   Left Ear: Hearing, tympanic membrane, external ear and ear canal normal.   Nose: Nose normal.   Mouth/Throat: Uvula is midline, oropharynx is clear and moist and mucous membranes are normal.   Eyes: Lids are normal.   Neck: Trachea normal, normal range of motion, full passive range of motion without pain and phonation normal. Neck supple.   Cardiovascular: Normal rate, regular " rhythm and normal heart sounds.   Pulmonary/Chest: Effort normal and breath sounds normal.   Abdominal: Soft. Bowel sounds are normal. There is no tenderness.       Musculoskeletal: Normal range of motion.   Lymphadenopathy:        Head (right side): No submental, no submandibular, no tonsillar, no preauricular, no posterior auricular and no occipital adenopathy present.        Head (left side): No submental, no submandibular, no tonsillar, no preauricular, no posterior auricular and no occipital adenopathy present.     She has no cervical adenopathy.   Neurological: She is alert.   Skin: Skin is warm, dry and intact.   Psychiatric: She has a normal mood and affect. Her speech is normal and behavior is normal. Judgment and thought content normal. Cognition and memory are normal.   Nursing note and vitals reviewed.      Assessment & Plan:       Moderate episode of recurrent major depressive disorder  -     escitalopram oxalate (LEXAPRO) 10 MG tablet; Take 1 tablet (10 mg total) by mouth once daily.  Dispense: 30 tablet; Refill: 3    GERD without esophagitis  -     sucralfate (CARAFATE) 100 mg/mL suspension; Take 10 mLs (1 g total) by mouth 4 (four) times daily.  Dispense: 1200 mL; Refill: 5    Anxiety  -     escitalopram oxalate (LEXAPRO) 10 MG tablet; Take 1 tablet (10 mg total) by mouth once daily.  Dispense: 30 tablet; Refill: 3    On home oxygen therapy    COPD with acute exacerbation    She has been advised to take blood sugar and blood pressure when she has the flushing feeling.         Medication List           Accurate as of 12/20/18 11:59 PM. If you have any questions, ask your nurse or doctor.               START taking these medications    escitalopram oxalate 10 MG tablet  Commonly known as:  LEXAPRO  Take 1 tablet (10 mg total) by mouth once daily.  Started by:  Talita Childs, ASHLEE, APRN        CHANGE how you take these medications    fluocinolone acetonide oil 0.01 % Drop  INSTILL THREE DROPS INTO  AFFECTED EAR TWICE DAILY  What changed:    · how much to take  · how to take this  · when to take this  · reasons to take this  · additional instructions     fluticasone 50 mcg/actuation nasal spray  Commonly known as:  FLONASE  1 spray (50 mcg total) by Each Nare route once daily.  What changed:    · when to take this  · reasons to take this     ipratropium 42 mcg (0.06 %) nasal spray  Commonly known as:  ATROVENT  2 sprays by Nasal route 4 (four) times daily. As needed for rhinitis  What changed:    · when to take this  · reasons to take this  · additional instructions     * sucralfate 1 gram tablet  Commonly known as:  CARAFATE  Take 1 tablet (1 g total) by mouth 4 (four) times daily. dissolve in 10 mL of water and swallow  What changed:  Another medication with the same name was added. Make sure you understand how and when to take each.  Changed by:  Talita Childs DNP, APRN     * sucralfate 100 mg/mL suspension  Commonly known as:  CARAFATE  Take 10 mLs (1 g total) by mouth 4 (four) times daily.  What changed:  You were already taking a medication with the same name, and this prescription was added. Make sure you understand how and when to take each.  Changed by:  Talita Childs DNP, APRN         * This list has 2 medication(s) that are the same as other medications prescribed for you. Read the directions carefully, and ask your doctor or other care provider to review them with you.            CONTINUE taking these medications    ALBUTEROL INHL     aspirin 81 MG EC tablet  Commonly known as:  ECOTRIN  Take 1 tablet (81 mg total) by mouth once daily.     BEVESPI AEROSPHERE 9-4.8 mcg Hfaa  Generic drug:  glycopyrrolate-formoterol     blood sugar diagnostic Strp  Commonly known as:  ONETOUCH ULTRA TEST  TEST ONE TIME AMAYA.     blood-glucose meter kit  Use as instructed     calcium carbonate 500 mg calcium (1,250 mg) tablet  Commonly known as:  OS-RENALDO     CHILDREN'S ALLEGRA ALLERGY ORAL     cyclobenzaprine 10 MG  tablet  Commonly known as:  FLEXERIL  TAKE 1 TABLET BY MOUTH 3 TIMES DAILY     gabapentin 400 MG capsule  Commonly known as:  NEURONTIN  Take two capsules in the morning and afternoon and three capsules at night.     glipiZIDE 5 MG tablet  Commonly known as:  GLUCOTROL  Take 1 tablet (5 mg total) by mouth 2 (two) times daily before meals.     lancets 33 gauge Misc  Commonly known as:  ONETOUCH DELICA LANCETS  1 lancet by Misc.(Non-Drug; Combo Route) route Daily. Use to     lisinopril 5 MG tablet  Commonly known as:  PRINIVIL,ZESTRIL  Take 1 tablet (5 mg total) by mouth once daily.     metFORMIN 500 MG 24 hr tablet  Commonly known as:  GLUCOPHAGE-XR  Take 1 tablet (500 mg total) by mouth 2 (two) times daily with meals.     metoprolol tartrate 25 MG tablet  Commonly known as:  LOPRESSOR  Take 1 tablet (25 mg total) by mouth 2 (two) times daily.     mometasone 220 mcg (60 doses) Aepb  Commonly known as:  ASMANEX TWISTHALER  Inhale 2 puffs into the lungs once daily. Controller     nitroGLYCERIN 0.4 MG SL tablet  Commonly known as:  NITROSTAT  Place 1 tablet (0.4 mg total) under the tongue every 5 (five) minutes as needed for Chest pain.     OPTICHAMBER MOOSE [facility]  Generic drug:  inhalation spacing device  USE AS DIRECTED FOR INHALATION. PLEASE RUN FOR INSURANCE PREFERRED SPACER AND PT PREFERRED SIZE.     pantoprazole 40 MG tablet  Commonly known as:  PROTONIX  Take 1 tablet (40 mg total) by mouth once daily.     predniSONE 10 MG tablet  Commonly known as:  DELTASONE  Prednisone 10mg PO 4 tabs daily x 2 days, then Prednisone 10mg PO 2 tabs daily x 2 days, then Prednisone 10mg PO 1 tab daily x 2 days     traMADol 50 mg tablet  Commonly known as:  ULTRAM  Take 2 tablets (100 mg total) by mouth every 8 (eight) hours as needed.        STOP taking these medications    FLUoxetine 10 MG Tab  Stopped by:  Talita Childs, ASHLEE, APRN           Where to Get Your Medications      These medications were sent to [facility] [address] [facility] -  [place], [place] - [address]  [address], [facility] [address]    Phone:  [phone]   · escitalopram oxalate 10 MG tablet     These medications were sent to [facility]  [address] [facility] [address], [facility] [address]    Hours:  Mon-Fri, 8a-5:30p Phone:  [phone]   · sucralfate 100 mg/mL suspension       No Follow-up on file.

## 2018-12-27 NOTE — PROGRESS NOTES
12/27/2018  Summary:  (1st Attempt)  RN-RACHEL received OPCM referral for High Risk patient recently discharged from the ED for COPD exacerbation. Patient is MSSP attributed to PCP, Dr.W.Michael Malone. Chart review completed. Patient is a 68 year old  female with a prior medical history of Depression, Anxiety, Bilateral Age-Related Macular Degeneration, COPD on Home Oxygen, Acute on Chronic Respiratory Failure with Hypoxemia, Hypertension, Hyperlipidemia, Coronary Artery Disease, Type 2 Diabetes with Diabetic Neuropathy, Fibromyalgia, GERD, Frequent Falls and Sleep-Disordered Breathing. Patient's Pulmonary MD is Dr. Lavelle Cronin. Patient has a surgical history of Cardiac Stents, Appendectomy, Cardiac Surgery, Bilateral Cataract Extraction, Hysterectomy with Oophorectomy and Kidney Stone Surgery. Patient is current on both Pneumonia and Flu shots. Attempted to contact patient to complete initial screen for OPCM. Called [phone] times 2; received immediate busy signal on both attempts with no ability to leave voice mail message. Will attempt to contact patient at a later date. - JOSE Candelario RN-OPCM    Interventions:  - Chart review completed.   - Attempted to call times 2; busy signal both times with no ability to leave voice mail message.  - Scheduled patient for next attempt next week.

## 2019-01-01 ENCOUNTER — TELEPHONE (OUTPATIENT)
Dept: CARDIOLOGY | Facility: CLINIC | Age: 69
End: 2019-01-01

## 2019-01-01 ENCOUNTER — TELEPHONE (OUTPATIENT)
Dept: UROLOGY | Facility: CLINIC | Age: 69
End: 2019-01-01

## 2019-01-01 ENCOUNTER — INITIAL CONSULT (OUTPATIENT)
Dept: PSYCHIATRY | Facility: CLINIC | Age: 69
End: 2019-01-01
Payer: MEDICARE

## 2019-01-01 ENCOUNTER — LAB VISIT (OUTPATIENT)
Dept: LAB | Facility: HOSPITAL | Age: 69
End: 2019-01-01
Attending: NURSE PRACTITIONER
Payer: MEDICARE

## 2019-01-01 ENCOUNTER — TELEPHONE (OUTPATIENT)
Dept: FAMILY MEDICINE | Facility: CLINIC | Age: 69
End: 2019-01-01

## 2019-01-01 ENCOUNTER — LAB VISIT (OUTPATIENT)
Dept: LAB | Facility: HOSPITAL | Age: 69
End: 2019-01-01
Attending: INTERNAL MEDICINE
Payer: MEDICARE

## 2019-01-01 ENCOUNTER — OFFICE VISIT (OUTPATIENT)
Dept: FAMILY MEDICINE | Facility: CLINIC | Age: 69
End: 2019-01-01
Payer: MEDICARE

## 2019-01-01 ENCOUNTER — OFFICE VISIT (OUTPATIENT)
Dept: PODIATRY | Facility: CLINIC | Age: 69
End: 2019-01-01
Payer: MEDICARE

## 2019-01-01 ENCOUNTER — CLINICAL SUPPORT (OUTPATIENT)
Dept: REHABILITATION | Facility: HOSPITAL | Age: 69
End: 2019-01-01
Attending: PODIATRIST
Payer: MEDICARE

## 2019-01-01 ENCOUNTER — OFFICE VISIT (OUTPATIENT)
Dept: OPTOMETRY | Facility: CLINIC | Age: 69
End: 2019-01-01
Payer: MEDICARE

## 2019-01-01 ENCOUNTER — OFFICE VISIT (OUTPATIENT)
Dept: CARDIOLOGY | Facility: CLINIC | Age: 69
End: 2019-01-01
Payer: MEDICARE

## 2019-01-01 ENCOUNTER — OFFICE VISIT (OUTPATIENT)
Dept: URGENT CARE | Facility: CLINIC | Age: 69
End: 2019-01-01
Payer: MEDICARE

## 2019-01-01 ENCOUNTER — HOSPITAL ENCOUNTER (OUTPATIENT)
Dept: RADIOLOGY | Facility: HOSPITAL | Age: 69
Discharge: HOME | End: 2019-04-10
Attending: INTERNAL MEDICINE
Payer: MEDICARE

## 2019-01-01 ENCOUNTER — PATIENT MESSAGE (OUTPATIENT)
Dept: FAMILY MEDICINE | Facility: CLINIC | Age: 69
End: 2019-01-01

## 2019-01-01 ENCOUNTER — CLINICAL SUPPORT (OUTPATIENT)
Dept: UROLOGY | Facility: CLINIC | Age: 69
End: 2019-01-01
Payer: MEDICARE

## 2019-01-01 ENCOUNTER — CLINICAL SUPPORT (OUTPATIENT)
Dept: CARDIOLOGY | Facility: CLINIC | Age: 69
End: 2019-01-01
Attending: INTERNAL MEDICINE
Payer: MEDICARE

## 2019-01-01 ENCOUNTER — LAB VISIT (OUTPATIENT)
Dept: LAB | Facility: HOSPITAL | Age: 69
End: 2019-01-01
Attending: FAMILY MEDICINE
Payer: MEDICARE

## 2019-01-01 ENCOUNTER — OFFICE VISIT (OUTPATIENT)
Dept: NEPHROLOGY | Facility: CLINIC | Age: 69
End: 2019-01-01
Payer: MEDICARE

## 2019-01-01 ENCOUNTER — PATIENT MESSAGE (OUTPATIENT)
Dept: OPTOMETRY | Facility: CLINIC | Age: 69
End: 2019-01-01

## 2019-01-01 ENCOUNTER — PATIENT MESSAGE (OUTPATIENT)
Dept: NEPHROLOGY | Facility: CLINIC | Age: 69
End: 2019-01-01

## 2019-01-01 ENCOUNTER — HOSPITAL ENCOUNTER (OUTPATIENT)
Dept: RADIOLOGY | Facility: HOSPITAL | Age: 69
Discharge: HOME | End: 2019-09-06
Attending: NURSE PRACTITIONER
Payer: MEDICARE

## 2019-01-01 ENCOUNTER — OUTPATIENT CASE MANAGEMENT (OUTPATIENT)
Dept: ADMINISTRATIVE | Facility: OTHER | Age: 69
End: 2019-01-01

## 2019-01-01 ENCOUNTER — HOSPITAL ENCOUNTER (OUTPATIENT)
Dept: RADIOLOGY | Facility: HOSPITAL | Age: 69
Discharge: HOME | End: 2019-05-17
Attending: INTERNAL MEDICINE
Payer: MEDICARE

## 2019-01-01 ENCOUNTER — TELEPHONE (OUTPATIENT)
Dept: NEPHROLOGY | Facility: CLINIC | Age: 69
End: 2019-01-01

## 2019-01-01 ENCOUNTER — PATIENT MESSAGE (OUTPATIENT)
Dept: CARDIOLOGY | Facility: CLINIC | Age: 69
End: 2019-01-01

## 2019-01-01 ENCOUNTER — DOCUMENTATION ONLY (OUTPATIENT)
Dept: FAMILY MEDICINE | Facility: CLINIC | Age: 69
End: 2019-01-01

## 2019-01-01 ENCOUNTER — TELEPHONE (OUTPATIENT)
Dept: URGENT CARE | Facility: CLINIC | Age: 69
End: 2019-01-01

## 2019-01-01 ENCOUNTER — PATIENT OUTREACH (OUTPATIENT)
Dept: ADMINISTRATIVE | Facility: HOSPITAL | Age: 69
End: 2019-01-01

## 2019-01-01 ENCOUNTER — HOSPITAL ENCOUNTER (OUTPATIENT)
Dept: RADIOLOGY | Facility: HOSPITAL | Age: 69
Discharge: HOME | End: 2019-08-30
Attending: NURSE PRACTITIONER
Payer: MEDICARE

## 2019-01-01 ENCOUNTER — OFFICE VISIT (OUTPATIENT)
Dept: UROLOGY | Facility: CLINIC | Age: 69
End: 2019-01-01
Payer: MEDICARE

## 2019-01-01 ENCOUNTER — HOSPITAL ENCOUNTER (OUTPATIENT)
Dept: RADIOLOGY | Facility: HOSPITAL | Age: 69
Discharge: HOME | End: 2019-01-09
Attending: FAMILY MEDICINE
Payer: MEDICARE

## 2019-01-01 ENCOUNTER — HOSPITAL ENCOUNTER (OUTPATIENT)
Dept: RADIOLOGY | Facility: HOSPITAL | Age: 69
Discharge: HOME | End: 2019-04-30
Attending: OBSTETRICS & GYNECOLOGY
Payer: MEDICARE

## 2019-01-01 ENCOUNTER — CLINICAL SUPPORT (OUTPATIENT)
Dept: CARDIOLOGY | Facility: CLINIC | Age: 69
End: 2019-01-01
Attending: NURSE PRACTITIONER
Payer: MEDICARE

## 2019-01-01 ENCOUNTER — OFFICE VISIT (OUTPATIENT)
Dept: OBSTETRICS AND GYNECOLOGY | Facility: CLINIC | Age: 69
End: 2019-01-01
Payer: MEDICARE

## 2019-01-01 ENCOUNTER — HOSPITAL ENCOUNTER (OUTPATIENT)
Dept: RADIOLOGY | Facility: HOSPITAL | Age: 69
Discharge: HOME | End: 2019-07-10
Attending: NURSE PRACTITIONER
Payer: MEDICARE

## 2019-01-01 ENCOUNTER — OFFICE VISIT (OUTPATIENT)
Dept: PAIN MEDICINE | Facility: CLINIC | Age: 69
End: 2019-01-01
Payer: MEDICARE

## 2019-01-01 ENCOUNTER — PATIENT MESSAGE (OUTPATIENT)
Dept: ADMINISTRATIVE | Facility: OTHER | Age: 69
End: 2019-01-01

## 2019-01-01 VITALS
DIASTOLIC BLOOD PRESSURE: 78 MMHG | SYSTOLIC BLOOD PRESSURE: 122 MMHG | HEART RATE: 72 BPM | SYSTOLIC BLOOD PRESSURE: 116 MMHG | BODY MASS INDEX: 30.91 KG/M2 | OXYGEN SATURATION: 95 % | HEART RATE: 79 BPM | DIASTOLIC BLOOD PRESSURE: 80 MMHG | OXYGEN SATURATION: 92 % | HEIGHT: 62 IN | TEMPERATURE: 98 F | WEIGHT: 183 LBS | BODY MASS INDEX: 33.68 KG/M2 | WEIGHT: 168 LBS | HEIGHT: 62 IN

## 2019-01-01 VITALS
TEMPERATURE: 98 F | OXYGEN SATURATION: 84 % | WEIGHT: 155.19 LBS | HEART RATE: 86 BPM | BODY MASS INDEX: 28.56 KG/M2 | DIASTOLIC BLOOD PRESSURE: 68 MMHG | SYSTOLIC BLOOD PRESSURE: 130 MMHG | HEIGHT: 62 IN

## 2019-01-01 VITALS
BODY MASS INDEX: 28.72 KG/M2 | HEIGHT: 62 IN | DIASTOLIC BLOOD PRESSURE: 64 MMHG | HEART RATE: 69 BPM | SYSTOLIC BLOOD PRESSURE: 120 MMHG | WEIGHT: 156.06 LBS | OXYGEN SATURATION: 88 %

## 2019-01-01 VITALS
OXYGEN SATURATION: 91 % | TEMPERATURE: 99 F | BODY MASS INDEX: 30.55 KG/M2 | RESPIRATION RATE: 18 BRPM | HEIGHT: 62 IN | HEART RATE: 91 BPM | DIASTOLIC BLOOD PRESSURE: 60 MMHG | WEIGHT: 166 LBS | SYSTOLIC BLOOD PRESSURE: 123 MMHG

## 2019-01-01 VITALS
HEART RATE: 72 BPM | DIASTOLIC BLOOD PRESSURE: 70 MMHG | SYSTOLIC BLOOD PRESSURE: 136 MMHG | BODY MASS INDEX: 27.79 KG/M2 | WEIGHT: 151 LBS | HEIGHT: 62 IN

## 2019-01-01 VITALS
RESPIRATION RATE: 14 BRPM | RESPIRATION RATE: 18 BRPM | DIASTOLIC BLOOD PRESSURE: 67 MMHG | SYSTOLIC BLOOD PRESSURE: 125 MMHG | OXYGEN SATURATION: 97 % | OXYGEN SATURATION: 100 % | DIASTOLIC BLOOD PRESSURE: 75 MMHG | RESPIRATION RATE: 14 BRPM | TEMPERATURE: 98 F | TEMPERATURE: 98 F | SYSTOLIC BLOOD PRESSURE: 119 MMHG | HEART RATE: 75 BPM | OXYGEN SATURATION: 100 % | TEMPERATURE: 98 F | HEART RATE: 77 BPM | HEART RATE: 70 BPM | SYSTOLIC BLOOD PRESSURE: 152 MMHG | DIASTOLIC BLOOD PRESSURE: 77 MMHG

## 2019-01-01 VITALS
BODY MASS INDEX: 28.89 KG/M2 | RESPIRATION RATE: 18 BRPM | WEIGHT: 157.63 LBS | DIASTOLIC BLOOD PRESSURE: 77 MMHG | HEIGHT: 62 IN | DIASTOLIC BLOOD PRESSURE: 56 MMHG | HEIGHT: 62 IN | HEART RATE: 81 BPM | HEART RATE: 92 BPM | TEMPERATURE: 98 F | BODY MASS INDEX: 29.01 KG/M2 | SYSTOLIC BLOOD PRESSURE: 118 MMHG | SYSTOLIC BLOOD PRESSURE: 139 MMHG | WEIGHT: 157 LBS | OXYGEN SATURATION: 90 %

## 2019-01-01 VITALS
OXYGEN SATURATION: 89 % | TEMPERATURE: 98 F | WEIGHT: 156 LBS | WEIGHT: 161.38 LBS | DIASTOLIC BLOOD PRESSURE: 60 MMHG | HEIGHT: 62 IN | SYSTOLIC BLOOD PRESSURE: 92 MMHG | DIASTOLIC BLOOD PRESSURE: 64 MMHG | TEMPERATURE: 98 F | OXYGEN SATURATION: 85 % | SYSTOLIC BLOOD PRESSURE: 130 MMHG | HEART RATE: 76 BPM | HEART RATE: 102 BPM | BODY MASS INDEX: 29.52 KG/M2 | BODY MASS INDEX: 28.71 KG/M2

## 2019-01-01 VITALS
BODY MASS INDEX: 27.81 KG/M2 | WEIGHT: 151.13 LBS | DIASTOLIC BLOOD PRESSURE: 61 MMHG | HEART RATE: 75 BPM | HEIGHT: 62 IN | SYSTOLIC BLOOD PRESSURE: 104 MMHG

## 2019-01-01 VITALS
BODY MASS INDEX: 29.01 KG/M2 | HEART RATE: 98 BPM | SYSTOLIC BLOOD PRESSURE: 150 MMHG | OXYGEN SATURATION: 92 % | DIASTOLIC BLOOD PRESSURE: 80 MMHG | WEIGHT: 157.63 LBS | TEMPERATURE: 98 F | HEIGHT: 62 IN

## 2019-01-01 VITALS — BODY MASS INDEX: 29.01 KG/M2 | HEIGHT: 62 IN | WEIGHT: 157.63 LBS

## 2019-01-01 VITALS
WEIGHT: 151 LBS | HEART RATE: 60 BPM | BODY MASS INDEX: 27.79 KG/M2 | HEIGHT: 62 IN | DIASTOLIC BLOOD PRESSURE: 70 MMHG | SYSTOLIC BLOOD PRESSURE: 136 MMHG

## 2019-01-01 VITALS
WEIGHT: 158 LBS | HEART RATE: 91 BPM | DIASTOLIC BLOOD PRESSURE: 67 MMHG | BODY MASS INDEX: 29.08 KG/M2 | SYSTOLIC BLOOD PRESSURE: 148 MMHG | HEIGHT: 62 IN

## 2019-01-01 VITALS
DIASTOLIC BLOOD PRESSURE: 70 MMHG | HEART RATE: 80 BPM | WEIGHT: 182 LBS | SYSTOLIC BLOOD PRESSURE: 110 MMHG | HEIGHT: 62 IN | BODY MASS INDEX: 33.49 KG/M2

## 2019-01-01 VITALS
HEART RATE: 82 BPM | RESPIRATION RATE: 18 BRPM | TEMPERATURE: 99 F | SYSTOLIC BLOOD PRESSURE: 120 MMHG | DIASTOLIC BLOOD PRESSURE: 60 MMHG | WEIGHT: 166.44 LBS | HEIGHT: 62 IN | OXYGEN SATURATION: 94 % | BODY MASS INDEX: 30.63 KG/M2

## 2019-01-01 VITALS — BODY MASS INDEX: 28.36 KG/M2 | HEIGHT: 62 IN | WEIGHT: 154.13 LBS

## 2019-01-01 VITALS
HEIGHT: 62 IN | HEART RATE: 84 BPM | WEIGHT: 158.06 LBS | SYSTOLIC BLOOD PRESSURE: 122 MMHG | DIASTOLIC BLOOD PRESSURE: 80 MMHG | BODY MASS INDEX: 29.08 KG/M2

## 2019-01-01 VITALS
SYSTOLIC BLOOD PRESSURE: 120 MMHG | DIASTOLIC BLOOD PRESSURE: 60 MMHG | HEART RATE: 76 BPM | BODY MASS INDEX: 28.93 KG/M2 | WEIGHT: 157.19 LBS | HEIGHT: 62 IN

## 2019-01-01 VITALS
BODY MASS INDEX: 29.66 KG/M2 | SYSTOLIC BLOOD PRESSURE: 114 MMHG | DIASTOLIC BLOOD PRESSURE: 62 MMHG | HEART RATE: 84 BPM | WEIGHT: 161.19 LBS | HEIGHT: 62 IN

## 2019-01-01 VITALS
HEIGHT: 62 IN | BODY MASS INDEX: 29.94 KG/M2 | WEIGHT: 162.69 LBS | DIASTOLIC BLOOD PRESSURE: 84 MMHG | SYSTOLIC BLOOD PRESSURE: 132 MMHG | HEART RATE: 100 BPM | RESPIRATION RATE: 20 BRPM

## 2019-01-01 DIAGNOSIS — R60.0 EDEMA OF BOTH LOWER EXTREMITIES: ICD-10-CM

## 2019-01-01 DIAGNOSIS — I25.10 CORONARY ARTERY DISEASE INVOLVING NATIVE CORONARY ARTERY OF NATIVE HEART WITHOUT ANGINA PECTORIS: Primary | ICD-10-CM

## 2019-01-01 DIAGNOSIS — R29.898 WEAKNESS OF BOTH LEGS: ICD-10-CM

## 2019-01-01 DIAGNOSIS — F32.0 CURRENT MILD EPISODE OF MAJOR DEPRESSIVE DISORDER, UNSPECIFIED WHETHER RECURRENT: ICD-10-CM

## 2019-01-01 DIAGNOSIS — R60.0 LOWER LEG EDEMA: ICD-10-CM

## 2019-01-01 DIAGNOSIS — J43.2 CENTRILOBULAR EMPHYSEMA: ICD-10-CM

## 2019-01-01 DIAGNOSIS — Z78.9 IMPAIRED MOBILITY AND ACTIVITIES OF DAILY LIVING: ICD-10-CM

## 2019-01-01 DIAGNOSIS — M25.50 MULTIPLE JOINT PAIN: ICD-10-CM

## 2019-01-01 DIAGNOSIS — E11.42 TYPE 2 DIABETES MELLITUS WITH DIABETIC POLYNEUROPATHY, WITHOUT LONG-TERM CURRENT USE OF INSULIN: Primary | ICD-10-CM

## 2019-01-01 DIAGNOSIS — E87.1 HYPONATREMIA: ICD-10-CM

## 2019-01-01 DIAGNOSIS — F33.42 RECURRENT MAJOR DEPRESSIVE DISORDER, IN FULL REMISSION: ICD-10-CM

## 2019-01-01 DIAGNOSIS — B35.1 ONYCHOMYCOSIS DUE TO DERMATOPHYTE: ICD-10-CM

## 2019-01-01 DIAGNOSIS — M79.7 FIBROMYALGIA: Primary | ICD-10-CM

## 2019-01-01 DIAGNOSIS — R30.0 DYSURIA: Primary | ICD-10-CM

## 2019-01-01 DIAGNOSIS — R26.9 ABNORMALITY OF GAIT AND MOBILITY: ICD-10-CM

## 2019-01-01 DIAGNOSIS — R06.02 SOB (SHORTNESS OF BREATH): ICD-10-CM

## 2019-01-01 DIAGNOSIS — E11.42 TYPE 2 DIABETES MELLITUS WITH DIABETIC POLYNEUROPATHY, WITHOUT LONG-TERM CURRENT USE OF INSULIN: ICD-10-CM

## 2019-01-01 DIAGNOSIS — R33.9 URINARY RETENTION: ICD-10-CM

## 2019-01-01 DIAGNOSIS — I10 ESSENTIAL HYPERTENSION: ICD-10-CM

## 2019-01-01 DIAGNOSIS — Z74.09 IMPAIRED MOBILITY AND ACTIVITIES OF DAILY LIVING: ICD-10-CM

## 2019-01-01 DIAGNOSIS — R11.2 NON-INTRACTABLE VOMITING WITH NAUSEA, UNSPECIFIED VOMITING TYPE: ICD-10-CM

## 2019-01-01 DIAGNOSIS — I27.20 PULMONARY HYPERTENSION: ICD-10-CM

## 2019-01-01 DIAGNOSIS — N30.00 ACUTE CYSTITIS WITHOUT HEMATURIA: ICD-10-CM

## 2019-01-01 DIAGNOSIS — M79.605 LEG PAIN, ANTERIOR, LEFT: ICD-10-CM

## 2019-01-01 DIAGNOSIS — N39.0 URINARY TRACT INFECTION WITHOUT HEMATURIA, SITE UNSPECIFIED: Primary | ICD-10-CM

## 2019-01-01 DIAGNOSIS — I50.813 ACUTE ON CHRONIC RIGHT-SIDED HEART FAILURE: Primary | ICD-10-CM

## 2019-01-01 DIAGNOSIS — I21.4 NSTEMI (NON-ST ELEVATED MYOCARDIAL INFARCTION): ICD-10-CM

## 2019-01-01 DIAGNOSIS — M72.2 PLANTAR FASCIITIS: ICD-10-CM

## 2019-01-01 DIAGNOSIS — I27.21 PAH (PULMONARY ARTERY HYPERTENSION): ICD-10-CM

## 2019-01-01 DIAGNOSIS — R29.6 FREQUENT FALLS: ICD-10-CM

## 2019-01-01 DIAGNOSIS — F41.9 ANXIETY: Primary | ICD-10-CM

## 2019-01-01 DIAGNOSIS — R60.0 LEG EDEMA, LEFT: ICD-10-CM

## 2019-01-01 DIAGNOSIS — H35.3132 INTERMEDIATE STAGE NONEXUDATIVE AGE-RELATED MACULAR DEGENERATION OF BOTH EYES: ICD-10-CM

## 2019-01-01 DIAGNOSIS — E87.5 HYPERKALEMIA: ICD-10-CM

## 2019-01-01 DIAGNOSIS — E78.2 MIXED HYPERLIPIDEMIA: ICD-10-CM

## 2019-01-01 DIAGNOSIS — J44.1 COPD EXACERBATION: ICD-10-CM

## 2019-01-01 DIAGNOSIS — R42 DIZZINESSES: Primary | ICD-10-CM

## 2019-01-01 DIAGNOSIS — J44.9 CHRONIC OBSTRUCTIVE PULMONARY DISEASE, UNSPECIFIED COPD TYPE: ICD-10-CM

## 2019-01-01 DIAGNOSIS — I27.21 PAH (PULMONARY ARTERY HYPERTENSION): Primary | ICD-10-CM

## 2019-01-01 DIAGNOSIS — N17.9 ACUTE KIDNEY INJURY: ICD-10-CM

## 2019-01-01 DIAGNOSIS — I25.10 CORONARY ARTERY DISEASE INVOLVING NATIVE CORONARY ARTERY OF NATIVE HEART WITHOUT ANGINA PECTORIS: ICD-10-CM

## 2019-01-01 DIAGNOSIS — R79.81 LOW OXYGEN SATURATION: ICD-10-CM

## 2019-01-01 DIAGNOSIS — Z78.0 POSTMENOPAUSAL: ICD-10-CM

## 2019-01-01 DIAGNOSIS — E78.5 HYPERLIPIDEMIA LDL GOAL <70: ICD-10-CM

## 2019-01-01 DIAGNOSIS — Z99.81 ON HOME OXYGEN THERAPY: ICD-10-CM

## 2019-01-01 DIAGNOSIS — Z01.419 ENCOUNTER FOR GYNECOLOGICAL EXAMINATION WITHOUT ABNORMAL FINDING: Primary | ICD-10-CM

## 2019-01-01 DIAGNOSIS — J44.1 OBSTRUCTIVE CHRONIC BRONCHITIS WITH EXACERBATION: ICD-10-CM

## 2019-01-01 DIAGNOSIS — M62.89 TIGHTNESS OF BOTH GASTROCNEMIUS MUSCLES: ICD-10-CM

## 2019-01-01 DIAGNOSIS — I10 HYPERTENSION, UNSPECIFIED TYPE: ICD-10-CM

## 2019-01-01 DIAGNOSIS — M79.89 SWELLING OF BOTH LOWER EXTREMITIES: ICD-10-CM

## 2019-01-01 DIAGNOSIS — I10 ESSENTIAL HYPERTENSION: Primary | ICD-10-CM

## 2019-01-01 DIAGNOSIS — E87.5 ACUTE HYPERKALEMIA: ICD-10-CM

## 2019-01-01 DIAGNOSIS — J96.11 CHRONIC RESPIRATORY FAILURE WITH HYPOXIA: ICD-10-CM

## 2019-01-01 DIAGNOSIS — R74.8 ELEVATED LIVER ENZYMES: ICD-10-CM

## 2019-01-01 DIAGNOSIS — Z90.721 S/P HYSTERECTOMY WITH OOPHORECTOMY: ICD-10-CM

## 2019-01-01 DIAGNOSIS — M25.572 PAIN, JOINT, ANKLE AND FOOT, LEFT: ICD-10-CM

## 2019-01-01 DIAGNOSIS — H43.813 POSTERIOR VITREOUS DETACHMENT, BILATERAL: ICD-10-CM

## 2019-01-01 DIAGNOSIS — J44.89 OBSTRUCTIVE CHRONIC BRONCHITIS WITHOUT EXACERBATION: Primary | ICD-10-CM

## 2019-01-01 DIAGNOSIS — R11.2 NAUSEA WITH VOMITING: ICD-10-CM

## 2019-01-01 DIAGNOSIS — R53.83 OTHER FATIGUE: ICD-10-CM

## 2019-01-01 DIAGNOSIS — F33.1 MODERATE EPISODE OF RECURRENT MAJOR DEPRESSIVE DISORDER: ICD-10-CM

## 2019-01-01 DIAGNOSIS — Z78.9 STATIN INTOLERANCE: ICD-10-CM

## 2019-01-01 DIAGNOSIS — M62.469 GASTROCNEMIUS EQUINUS, UNSPECIFIED LATERALITY: ICD-10-CM

## 2019-01-01 DIAGNOSIS — Z90.710 S/P HYSTERECTOMY WITH OOPHORECTOMY: ICD-10-CM

## 2019-01-01 DIAGNOSIS — I10 HYPERTENSION, UNSPECIFIED TYPE: Primary | ICD-10-CM

## 2019-01-01 DIAGNOSIS — R11.2 NAUSEA AND VOMITING, INTRACTABILITY OF VOMITING NOT SPECIFIED, UNSPECIFIED VOMITING TYPE: Primary | ICD-10-CM

## 2019-01-01 DIAGNOSIS — J96.11 CHRONIC RESPIRATORY FAILURE WITH HYPOXIA: Primary | ICD-10-CM

## 2019-01-01 DIAGNOSIS — R33.9 URINARY RETENTION: Primary | ICD-10-CM

## 2019-01-01 DIAGNOSIS — R11.2 NAUSEA AND VOMITING IN ADULT: Primary | ICD-10-CM

## 2019-01-01 DIAGNOSIS — I50.813 ACUTE ON CHRONIC RIGHT HEART FAILURE: Primary | ICD-10-CM

## 2019-01-01 DIAGNOSIS — I25.10 CORONARY ARTERY DISEASE INVOLVING NATIVE CORONARY ARTERY WITHOUT ANGINA PECTORIS, UNSPECIFIED WHETHER NATIVE OR TRANSPLANTED HEART: ICD-10-CM

## 2019-01-01 DIAGNOSIS — M25.572 PAIN, JOINT, ANKLE AND FOOT, LEFT: Primary | ICD-10-CM

## 2019-01-01 DIAGNOSIS — F41.9 ANXIETY: ICD-10-CM

## 2019-01-01 DIAGNOSIS — E13.9 DIABETES 1.5, MANAGED AS TYPE 1: ICD-10-CM

## 2019-01-01 DIAGNOSIS — J44.89 OBSTRUCTIVE CHRONIC BRONCHITIS WITHOUT EXACERBATION: ICD-10-CM

## 2019-01-01 DIAGNOSIS — R53.81 PHYSICAL DEBILITY: ICD-10-CM

## 2019-01-01 DIAGNOSIS — N28.9 ABNORMAL KIDNEY FUNCTION: Primary | ICD-10-CM

## 2019-01-01 DIAGNOSIS — R11.2 NON-INTRACTABLE VOMITING WITH NAUSEA, UNSPECIFIED VOMITING TYPE: Primary | ICD-10-CM

## 2019-01-01 DIAGNOSIS — M25.60 LIMITED JOINT RANGE OF MOTION (ROM): ICD-10-CM

## 2019-01-01 DIAGNOSIS — N17.9 ACUTE RENAL FAILURE, UNSPECIFIED ACUTE RENAL FAILURE TYPE: ICD-10-CM

## 2019-01-01 DIAGNOSIS — F32.89 OTHER DEPRESSION: ICD-10-CM

## 2019-01-01 DIAGNOSIS — E87.5 HYPERKALEMIA: Primary | ICD-10-CM

## 2019-01-01 DIAGNOSIS — E11.9 DIABETES MELLITUS TYPE 2 WITHOUT RETINOPATHY: Primary | ICD-10-CM

## 2019-01-01 DIAGNOSIS — Z12.39 BREAST CANCER SCREENING: ICD-10-CM

## 2019-01-01 DIAGNOSIS — J96.11 CHRONIC HYPOXEMIC RESPIRATORY FAILURE: Primary | ICD-10-CM

## 2019-01-01 DIAGNOSIS — R74.8 ELEVATED LIVER ENZYMES: Primary | ICD-10-CM

## 2019-01-01 DIAGNOSIS — R34 ANURIA AND OLIGURIA: Primary | ICD-10-CM

## 2019-01-01 DIAGNOSIS — E11.9 TYPE 2 DIABETES MELLITUS WITHOUT COMPLICATION: ICD-10-CM

## 2019-01-01 DIAGNOSIS — N17.9 ACUTE RENAL FAILURE, UNSPECIFIED ACUTE RENAL FAILURE TYPE: Primary | ICD-10-CM

## 2019-01-01 LAB
A ALTERNATA IGE QN: <0.35 KU/L
A FUMIGATUS IGE QN: <0.35 KU/L
ALBUMIN SERPL BCP-MCNC: 2.9 G/DL (ref 3.5–5.2)
ALBUMIN SERPL BCP-MCNC: 2.9 G/DL (ref 3.5–5.2)
ALBUMIN SERPL BCP-MCNC: 3 G/DL (ref 3.5–5.2)
ALBUMIN SERPL BCP-MCNC: 3.3 G/DL (ref 3.5–5.2)
ALBUMIN SERPL BCP-MCNC: 3.4 G/DL (ref 3.5–5.2)
ALBUMIN SERPL BCP-MCNC: 3.4 G/DL (ref 3.5–5.2)
ALBUMIN SERPL BCP-MCNC: 3.6 G/DL (ref 3.5–5.2)
ALBUMIN SERPL BCP-MCNC: 3.7 G/DL (ref 3.5–5.2)
ALLERGEN MULBERRY CLASS: NORMAL
ALLERGEN MULBERRY TREE IGE: <0.35 KU/L
ALLERGEN NAME: NORMAL
ALLERGEN RESULT: NORMAL
ALP SERPL-CCNC: 138 U/L (ref 55–135)
ALP SERPL-CCNC: 177 U/L (ref 55–135)
ALP SERPL-CCNC: 179 U/L (ref 55–135)
ALP SERPL-CCNC: 200 U/L (ref 55–135)
ALT SERPL W/O P-5'-P-CCNC: 149 U/L (ref 10–44)
ALT SERPL W/O P-5'-P-CCNC: 33 U/L (ref 10–44)
ALT SERPL W/O P-5'-P-CCNC: 39 U/L (ref 10–44)
ALT SERPL W/O P-5'-P-CCNC: 795 U/L (ref 10–44)
AMYLASE SERPL-CCNC: 20 U/L (ref 20–110)
ANION GAP SERPL CALC-SCNC: 12 MMOL/L (ref 8–16)
ANION GAP SERPL CALC-SCNC: 13 MMOL/L (ref 8–16)
ANION GAP SERPL CALC-SCNC: 13 MMOL/L (ref 8–16)
ANION GAP SERPL CALC-SCNC: 14 MMOL/L (ref 8–16)
ANION GAP SERPL CALC-SCNC: 14 MMOL/L (ref 8–16)
ANION GAP SERPL CALC-SCNC: 15 MMOL/L (ref 8–16)
ANION GAP SERPL CALC-SCNC: 16 MMOL/L (ref 8–16)
ANION GAP SERPL CALC-SCNC: 20 MMOL/L (ref 8–16)
ASCENDING AORTA: 2.01 CM
AST SERPL-CCNC: 238 U/L (ref 10–40)
AST SERPL-CCNC: 31 U/L (ref 10–40)
AST SERPL-CCNC: 34 U/L (ref 10–40)
AST SERPL-CCNC: 44 U/L (ref 10–40)
AV INDEX (PROSTH): 0.73
AV MEAN GRADIENT: 4 MMHG
AV PEAK GRADIENT: 9 MMHG
AV VALVE AREA: 2.3 CM2
AV VELOCITY RATIO: 0.71
BACTERIA UR CULT: ABNORMAL
BACTERIA UR CULT: ABNORMAL
BASOPHILS # BLD AUTO: 0.03 K/UL (ref 0–0.2)
BASOPHILS # BLD AUTO: 0.05 K/UL (ref 0–0.2)
BASOPHILS NFR BLD: 0.4 % (ref 0–1.9)
BASOPHILS NFR BLD: 0.6 % (ref 0–1.9)
BERMUDA GRASS IGE QN: <0.35 KU/L
BILIRUB SERPL-MCNC: 0.7 MG/DL (ref 0.1–1)
BILIRUB SERPL-MCNC: 0.8 MG/DL (ref 0.1–1)
BILIRUB SERPL-MCNC: 0.8 MG/DL (ref 0.1–1)
BILIRUB SERPL-MCNC: 1.1 MG/DL (ref 0.1–1)
BILIRUB SERPL-MCNC: ABNORMAL MG/DL
BILIRUB UR QL STRIP: NEGATIVE
BLOOD URINE, POC: ABNORMAL
BSA FOR ECHO PROCEDURE: 1.73 M2
BUN SERPL-MCNC: 24 MG/DL (ref 8–23)
BUN SERPL-MCNC: 27 MG/DL (ref 8–23)
BUN SERPL-MCNC: 29 MG/DL (ref 8–23)
BUN SERPL-MCNC: 34 MG/DL (ref 8–23)
BUN SERPL-MCNC: 36 MG/DL (ref 8–23)
BUN SERPL-MCNC: 36 MG/DL (ref 8–23)
BUN SERPL-MCNC: 37 MG/DL (ref 8–23)
BUN SERPL-MCNC: 38 MG/DL (ref 8–23)
C HERBARUM IGE QN: <0.35 KU/L
CALCIUM SERPL-MCNC: 10 MG/DL (ref 8.7–10.5)
CALCIUM SERPL-MCNC: 8.9 MG/DL (ref 8.7–10.5)
CALCIUM SERPL-MCNC: 9 MG/DL (ref 8.7–10.5)
CALCIUM SERPL-MCNC: 9.4 MG/DL (ref 8.7–10.5)
CALCIUM SERPL-MCNC: 9.6 MG/DL (ref 8.7–10.5)
CALCIUM SERPL-MCNC: 9.8 MG/DL (ref 8.7–10.5)
CAT DANDER IGE QN: <0.35 KU/L
CEDAR IGE QN: <0.35 KU/L
CHLORIDE SERPL-SCNC: 100 MMOL/L (ref 95–110)
CHLORIDE SERPL-SCNC: 101 MMOL/L (ref 95–110)
CHLORIDE SERPL-SCNC: 101 MMOL/L (ref 95–110)
CHLORIDE SERPL-SCNC: 93 MMOL/L (ref 95–110)
CHLORIDE SERPL-SCNC: 94 MMOL/L (ref 95–110)
CHLORIDE SERPL-SCNC: 97 MMOL/L (ref 95–110)
CHLORIDE SERPL-SCNC: 97 MMOL/L (ref 95–110)
CHLORIDE SERPL-SCNC: 99 MMOL/L (ref 95–110)
CO2 SERPL-SCNC: 18 MMOL/L (ref 23–29)
CO2 SERPL-SCNC: 19 MMOL/L (ref 23–29)
CO2 SERPL-SCNC: 19 MMOL/L (ref 23–29)
CO2 SERPL-SCNC: 22 MMOL/L (ref 23–29)
CO2 SERPL-SCNC: 22 MMOL/L (ref 23–29)
CO2 SERPL-SCNC: 23 MMOL/L (ref 23–29)
CO2 SERPL-SCNC: 24 MMOL/L (ref 23–29)
CO2 SERPL-SCNC: 27 MMOL/L (ref 23–29)
COLOR, POC UA: ABNORMAL
CREAT SERPL-MCNC: 1.1 MG/DL (ref 0.5–1.4)
CREAT SERPL-MCNC: 1.4 MG/DL (ref 0.5–1.4)
CREAT SERPL-MCNC: 1.5 MG/DL (ref 0.5–1.4)
CREAT SERPL-MCNC: 1.5 MG/DL (ref 0.5–1.4)
CREAT SERPL-MCNC: 1.7 MG/DL (ref 0.5–1.4)
CREAT SERPL-MCNC: 2.3 MG/DL (ref 0.5–1.4)
CV ECHO LV RWT: 0.59 CM
D FARINAE IGE QN: <0.35 KU/L
D PTERONYSS IGE QN: <0.35 KU/L
DEPRECATED A ALTERNATA IGE RAST QL: NORMAL
DEPRECATED A FUMIGATUS IGE RAST QL: NORMAL
DEPRECATED BERMUDA GRASS IGE RAST QL: NORMAL
DEPRECATED C HERBARUM IGE RAST QL: NORMAL
DEPRECATED CAT DANDER IGE RAST QL: NORMAL
DEPRECATED CEDAR IGE RAST QL: NORMAL
DEPRECATED D FARINAE IGE RAST QL: NORMAL
DEPRECATED D PTERONYSS IGE RAST QL: NORMAL
DEPRECATED DOG DANDER IGE RAST QL: NORMAL
DEPRECATED ELDER IGE RAST QL: NORMAL
DEPRECATED LONDON PLANE IGE RAST QL: NORMAL
DEPRECATED P NOTATUM IGE RAST QL: NORMAL
DEPRECATED PECAN/HICK TREE IGE RAST QL: NORMAL
DEPRECATED ROACH IGE RAST QL: NORMAL
DEPRECATED WHITE OAK IGE RAST QL: NORMAL
DIFFERENTIAL METHOD: ABNORMAL
DIFFERENTIAL METHOD: ABNORMAL
DOG DANDER IGE QN: <0.35 KU/L
DOP CALC AO PEAK VEL: 1.49 M/S
DOP CALC AO VTI: 32 CM
DOP CALC LVOT AREA: 3.1 CM2
DOP CALC LVOT DIAMETER: 2 CM
DOP CALC LVOT PEAK VEL: 1.06 M/S
DOP CALC LVOT STROKE VOLUME: 73.7 CM3
DOP CALCLVOT PEAK VEL VTI: 23.47 CM
E WAVE DECELERATION TIME: 268.3 MSEC
E/A RATIO: 0.88
E/E' RATIO: 10.6 M/S
ECHO LV POSTERIOR WALL: 1.04 CM (ref 0.6–1.1)
ELDER IGE QN: <0.35 KU/L
ELM CEDAR CLASS: NORMAL
ELM CEDAR, IGE: <0.35 KU/L
EOSINOPHIL # BLD AUTO: 0 K/UL (ref 0–0.5)
EOSINOPHIL # BLD AUTO: 0.1 K/UL (ref 0–0.5)
EOSINOPHIL NFR BLD: 0.4 % (ref 0–8)
EOSINOPHIL NFR BLD: 0.8 % (ref 0–8)
ERYTHROCYTE [DISTWIDTH] IN BLOOD BY AUTOMATED COUNT: 14.1 % (ref 11.5–14.5)
ERYTHROCYTE [DISTWIDTH] IN BLOOD BY AUTOMATED COUNT: 14.6 % (ref 11.5–14.5)
EST. GFR  (AFRICAN AMERICAN): 24.6 ML/MIN/1.73 M^2
EST. GFR  (AFRICAN AMERICAN): 35 ML/MIN/1.73 M^2
EST. GFR  (AFRICAN AMERICAN): 41 ML/MIN/1.73 M^2
EST. GFR  (AFRICAN AMERICAN): 41.3 ML/MIN/1.73 M^2
EST. GFR  (AFRICAN AMERICAN): 44.9 ML/MIN/1.73 M^2
EST. GFR  (AFRICAN AMERICAN): >60 ML/MIN/1.73 M^2
EST. GFR  (NON AFRICAN AMERICAN): 21.3 ML/MIN/1.73 M^2
EST. GFR  (NON AFRICAN AMERICAN): 31 ML/MIN/1.73 M^2
EST. GFR  (NON AFRICAN AMERICAN): 35.8 ML/MIN/1.73 M^2
EST. GFR  (NON AFRICAN AMERICAN): 36 ML/MIN/1.73 M^2
EST. GFR  (NON AFRICAN AMERICAN): 38.9 ML/MIN/1.73 M^2
EST. GFR  (NON AFRICAN AMERICAN): 52 ML/MIN/1.73 M^2
FRACTIONAL SHORTENING: 32 % (ref 28–44)
GLUCOSE SERPL-MCNC: 133 MG/DL (ref 70–110)
GLUCOSE SERPL-MCNC: 166 MG/DL (ref 70–110)
GLUCOSE SERPL-MCNC: 169 MG/DL (ref 70–110)
GLUCOSE SERPL-MCNC: 179 MG/DL (ref 70–110)
GLUCOSE SERPL-MCNC: 202 MG/DL (ref 70–110)
GLUCOSE SERPL-MCNC: 202 MG/DL (ref 70–110)
GLUCOSE SERPL-MCNC: 345 MG/DL (ref 70–110)
GLUCOSE SERPL-MCNC: 360 MG/DL (ref 70–110)
GLUCOSE UR QL STRIP: 500
GLUCOSE UR QL STRIP: POSITIVE
HCT VFR BLD AUTO: 41.2 % (ref 37–48.5)
HCT VFR BLD AUTO: 41.8 % (ref 37–48.5)
HGB BLD-MCNC: 12.9 G/DL (ref 12–16)
HGB BLD-MCNC: 13.2 G/DL (ref 12–16)
IGE SERPL-ACNC: 210 IU/ML (ref 0–100)
IMM GRANULOCYTES # BLD AUTO: 0.02 K/UL (ref 0–0.04)
IMM GRANULOCYTES # BLD AUTO: 0.03 K/UL (ref 0–0.04)
IMM GRANULOCYTES NFR BLD AUTO: 0.3 % (ref 0–0.5)
IMM GRANULOCYTES NFR BLD AUTO: 0.3 % (ref 0–0.5)
INTERVENTRICULAR SEPTUM: 0.96 CM (ref 0.6–1.1)
IVRT: 0.12 MSEC
KETONES UR QL STRIP: ABNORMAL
KETONES UR QL STRIP: NEGATIVE
LA MAJOR: 4.99 CM
LA MINOR: 4.88 CM
LA WIDTH: 3.07 CM
LEFT ATRIUM SIZE: 3.12 CM
LEFT ATRIUM VOLUME INDEX: 23.7 ML/M2
LEFT ATRIUM VOLUME: 40.17 CM3
LEFT GREAT SAPHENOUS DISTAL THIGH DIA: 2.7 CM
LEFT GREAT SAPHENOUS JUNCTION DIA: 5.6 CM
LEFT GREAT SAPHENOUS KNEE DIA: 3 CM
LEFT GREAT SAPHENOUS MIDDLE THIGH DIA: 6.7 CM
LEFT GREAT SAPHENOUS PROXIMAL CALF DIA: 1.7 CM
LEFT INTERNAL DIMENSION IN SYSTOLE: 2.39 CM (ref 2.1–4)
LEFT SMALL SAPHENOUS KNEE DIA: 3 CM
LEFT SMALL SAPHENOUS SPJ DIA: 2 CM
LEFT VENTRICLE DIASTOLIC VOLUME INDEX: 30.09 ML/M2
LEFT VENTRICLE DIASTOLIC VOLUME: 51.05 ML
LEFT VENTRICLE MASS INDEX: 61 G/M2
LEFT VENTRICLE SYSTOLIC VOLUME INDEX: 11.7 ML/M2
LEFT VENTRICLE SYSTOLIC VOLUME: 19.91 ML
LEFT VENTRICULAR INTERNAL DIMENSION IN DIASTOLE: 3.51 CM (ref 3.5–6)
LEFT VENTRICULAR MASS: 103.8 G
LEUKOCYTE ESTERASE UR QL STRIP: NEGATIVE
LEUKOCYTE ESTERASE URINE, POC: ABNORMAL
LIPASE SERPL-CCNC: 18 U/L (ref 4–60)
LONDON PLANE IGE QN: <0.35 KU/L
LV LATERAL E/E' RATIO: 10.6 M/S
LV SEPTAL E/E' RATIO: 10.6 M/S
LYMPHOCYTES # BLD AUTO: 0.8 K/UL (ref 1–4.8)
LYMPHOCYTES # BLD AUTO: 1.9 K/UL (ref 1–4.8)
LYMPHOCYTES NFR BLD: 21.8 % (ref 18–48)
LYMPHOCYTES NFR BLD: 9.6 % (ref 18–48)
MCH RBC QN AUTO: 29 PG (ref 27–31)
MCH RBC QN AUTO: 31.2 PG (ref 27–31)
MCHC RBC AUTO-ENTMCNC: 30.9 G/DL (ref 32–36)
MCHC RBC AUTO-ENTMCNC: 32 G/DL (ref 32–36)
MCV RBC AUTO: 94 FL (ref 82–98)
MCV RBC AUTO: 97 FL (ref 82–98)
MONOCYTES # BLD AUTO: 1 K/UL (ref 0.3–1)
MONOCYTES # BLD AUTO: 1 K/UL (ref 0.3–1)
MONOCYTES NFR BLD: 10.7 % (ref 4–15)
MONOCYTES NFR BLD: 12.6 % (ref 4–15)
MV PEAK A VEL: 1.2 M/S
MV PEAK E VEL: 1.06 M/S
NEUTROPHILS # BLD AUTO: 5.8 K/UL (ref 1.8–7.7)
NEUTROPHILS # BLD AUTO: 6.1 K/UL (ref 1.8–7.7)
NEUTROPHILS NFR BLD: 65.8 % (ref 38–73)
NEUTROPHILS NFR BLD: 76.7 % (ref 38–73)
NITRITE, POC UA: ABNORMAL
NRBC BLD-RTO: 0 /100 WBC
NRBC BLD-RTO: 0 /100 WBC
OTHER ANTIBIOTIC SUSC ISLT: ABNORMAL
P NOTATUM IGE QN: <0.35 KU/L
PECAN/HICK TREE IGE QN: <0.35 KU/L
PH, POC UA: 6
PH, POC UA: 6 (ref 5–8)
PHOSPHATE SERPL-MCNC: 2.6 MG/DL (ref 2.7–4.5)
PHOSPHATE SERPL-MCNC: 3.3 MG/DL (ref 2.7–4.5)
PHOSPHATE SERPL-MCNC: 3.3 MG/DL (ref 2.7–4.5)
PHOSPHATE SERPL-MCNC: 3.4 MG/DL (ref 2.7–4.5)
PISA TR MAX VEL: 3.67 M/S
PLATELET # BLD AUTO: 332 K/UL (ref 150–350)
PLATELET # BLD AUTO: 389 K/UL (ref 150–350)
PMV BLD AUTO: 10 FL (ref 9.2–12.9)
PMV BLD AUTO: 10.6 FL (ref 9.2–12.9)
POC BLOOD, URINE: NEGATIVE
POC NITRATES, URINE: NEGATIVE
POTASSIUM SERPL-SCNC: 3.9 MMOL/L (ref 3.5–5.1)
POTASSIUM SERPL-SCNC: 4.3 MMOL/L (ref 3.5–5.1)
POTASSIUM SERPL-SCNC: 4.5 MMOL/L (ref 3.5–5.1)
POTASSIUM SERPL-SCNC: 4.8 MMOL/L (ref 3.5–5.1)
POTASSIUM SERPL-SCNC: 4.9 MMOL/L
POTASSIUM SERPL-SCNC: 5.9 MMOL/L (ref 3.5–5.1)
POTASSIUM SERPL-SCNC: 6.4 MMOL/L (ref 3.5–5.1)
POTASSIUM SERPL-SCNC: 6.6 MMOL/L (ref 3.5–5.1)
POTASSIUM SERPL-SCNC: 6.6 MMOL/L (ref 3.5–5.1)
PROT SERPL-MCNC: 6.6 G/DL (ref 6–8.4)
PROT SERPL-MCNC: 6.9 G/DL (ref 6–8.4)
PROT SERPL-MCNC: 7.1 G/DL (ref 6–8.4)
PROT SERPL-MCNC: 7.4 G/DL (ref 6–8.4)
PROT UR QL STRIP: NEGATIVE
PROTEIN, POC: ABNORMAL
PULM VEIN S/D RATIO: 1.52
PV PEAK D VEL: 0.5 M/S
PV PEAK S VEL: 0.76 M/S
RA MAJOR: 5.28 CM
RA PRESSURE: 3 MMHG
RA WIDTH: 3.47 CM
RBC # BLD AUTO: 4.23 M/UL (ref 4–5.4)
RBC # BLD AUTO: 4.45 M/UL (ref 4–5.4)
RIGHT GREAT SAPHENOUS DISTAL THIGH DIA: 2 CM
RIGHT GREAT SAPHENOUS JUNCTION DIA: 7.5 CM
RIGHT GREAT SAPHENOUS KNEE DIA: 2.4 CM
RIGHT GREAT SAPHENOUS MIDDLE THIGH DIA: 5.3 CM
RIGHT GREAT SAPHENOUS PROXIMAL CALF DIA: 2.2 CM
RIGHT SMALL SAPHENOUS KNEE DIA: 2 CM
RIGHT SMALL SAPHENOUS SPJ DIA: 2.3 CM
RIGHT VENTRICULAR END-DIASTOLIC DIMENSION: 3.73 CM
ROACH IGE QN: <0.35 KU/L
RV TISSUE DOPPLER FREE WALL SYSTOLIC VELOCITY 1 (APICAL 4 CHAMBER VIEW): 9.86 CM/S
SINUS: 2.51 CM
SODIUM SERPL-SCNC: 132 MMOL/L (ref 136–145)
SODIUM SERPL-SCNC: 135 MMOL/L (ref 136–145)
SODIUM SERPL-SCNC: 136 MMOL/L (ref 136–145)
SODIUM SERPL-SCNC: 136 MMOL/L (ref 136–145)
SP GR UR STRIP: 1.01 (ref 1–1.03)
SPECIFIC GRAVITY, POC UA: 1030
STJ: 2.21 CM
TDI LATERAL: 0.1 M/S
TDI SEPTAL: 0.1 M/S
TDI: 0.1 M/S
THEOPHYLLINE SERPL-MCNC: 26.8 UG/ML (ref 8–20)
TR MAX PG: 54 MMHG
TRICUSPID ANNULAR PLANE SYSTOLIC EXCURSION: 2.29 CM
TV REST PULMONARY ARTERY PRESSURE: 57 MMHG
UROBILINOGEN UR STRIP-ACNC: NORMAL (ref 0.1–1.1)
UROBILINOGEN, POC UA: 0.2
WBC # BLD AUTO: 7.88 K/UL (ref 3.9–12.7)
WBC # BLD AUTO: 8.88 K/UL (ref 3.9–12.7)
WHITE OAK IGE QN: <0.35 KU/L

## 2019-01-01 PROCEDURE — 99999 PR PBB SHADOW E&M-EST. PATIENT-LVL IV: CPT | Mod: PBBFAC,,, | Performed by: NURSE PRACTITIONER

## 2019-01-01 PROCEDURE — 99214 PR OFFICE/OUTPT VISIT, EST, LEVL IV, 30-39 MIN: ICD-10-PCS | Mod: S$PBB,,, | Performed by: FAMILY MEDICINE

## 2019-01-01 PROCEDURE — 99213 OFFICE O/P EST LOW 20 MIN: CPT | Mod: S$GLB,,, | Performed by: FAMILY MEDICINE

## 2019-01-01 PROCEDURE — 99215 OFFICE O/P EST HI 40 MIN: CPT | Mod: PBBFAC,PO | Performed by: NURSE PRACTITIONER

## 2019-01-01 PROCEDURE — 99999 PR PBB SHADOW E&M-EST. PATIENT-LVL III: ICD-10-PCS | Mod: PBBFAC,,, | Performed by: FAMILY MEDICINE

## 2019-01-01 PROCEDURE — 93970 EXTREMITY STUDY: CPT | Mod: PBBFAC,PO | Performed by: INTERNAL MEDICINE

## 2019-01-01 PROCEDURE — 99213 PR OFFICE/OUTPT VISIT, EST, LEVL III, 20-29 MIN: ICD-10-PCS | Mod: S$GLB,,, | Performed by: FAMILY MEDICINE

## 2019-01-01 PROCEDURE — 99999 PR PBB SHADOW E&M-EST. PATIENT-LVL V: ICD-10-PCS | Mod: PBBFAC,,, | Performed by: NURSE PRACTITIONER

## 2019-01-01 PROCEDURE — 99999 PR PBB SHADOW E&M-EST. PATIENT-LVL III: CPT | Mod: PBBFAC,,, | Performed by: FAMILY MEDICINE

## 2019-01-01 PROCEDURE — G0101 CA SCREEN;PELVIC/BREAST EXAM: HCPCS | Mod: S$PBB,,, | Performed by: OBSTETRICS & GYNECOLOGY

## 2019-01-01 PROCEDURE — 99999 PR PBB SHADOW E&M-EST. PATIENT-LVL III: CPT | Mod: PBBFAC,,, | Performed by: INTERNAL MEDICINE

## 2019-01-01 PROCEDURE — 81003 URINALYSIS AUTO W/O SCOPE: CPT | Mod: QW,S$GLB,, | Performed by: FAMILY MEDICINE

## 2019-01-01 PROCEDURE — 36415 COLL VENOUS BLD VENIPUNCTURE: CPT | Mod: PO

## 2019-01-01 PROCEDURE — 99203 PR OFFICE/OUTPT VISIT, NEW, LEVL III, 30-44 MIN: ICD-10-PCS | Mod: S$PBB,,, | Performed by: ANESTHESIOLOGY

## 2019-01-01 PROCEDURE — 99215 OFFICE O/P EST HI 40 MIN: CPT | Mod: PBBFAC,PN | Performed by: PODIATRIST

## 2019-01-01 PROCEDURE — 99999 PR PBB SHADOW E&M-EST. PATIENT-LVL V: CPT | Mod: PBBFAC,,, | Performed by: NURSE PRACTITIONER

## 2019-01-01 PROCEDURE — 90791 PSYCH DIAGNOSTIC EVALUATION: CPT | Mod: ,,, | Performed by: PSYCHOLOGIST

## 2019-01-01 PROCEDURE — 99213 OFFICE O/P EST LOW 20 MIN: CPT | Mod: PBBFAC,27,PO | Performed by: FAMILY MEDICINE

## 2019-01-01 PROCEDURE — 99213 OFFICE O/P EST LOW 20 MIN: CPT | Mod: PBBFAC,PN | Performed by: OBSTETRICS & GYNECOLOGY

## 2019-01-01 PROCEDURE — 99999 PR PBB SHADOW E&M-EST. PATIENT-LVL II: ICD-10-PCS | Mod: PBBFAC,,,

## 2019-01-01 PROCEDURE — 99214 OFFICE O/P EST MOD 30 MIN: CPT | Mod: S$PBB,,, | Performed by: FAMILY MEDICINE

## 2019-01-01 PROCEDURE — 99213 OFFICE O/P EST LOW 20 MIN: CPT | Mod: PBBFAC,PO,25 | Performed by: INTERNAL MEDICINE

## 2019-01-01 PROCEDURE — 99214 PR OFFICE/OUTPT VISIT, EST, LEVL IV, 30-39 MIN: ICD-10-PCS | Mod: S$PBB,,, | Performed by: NURSE PRACTITIONER

## 2019-01-01 PROCEDURE — 93970 CV US LOWER VENOUS INSUFFICIENCY BILATERAL (CUPID ONLY): ICD-10-PCS | Mod: 26,S$PBB,, | Performed by: INTERNAL MEDICINE

## 2019-01-01 PROCEDURE — 84132 ASSAY OF SERUM POTASSIUM: CPT

## 2019-01-01 PROCEDURE — 99999 PR PBB SHADOW E&M-EST. PATIENT-LVL III: ICD-10-PCS | Mod: PBBFAC,,, | Performed by: INTERNAL MEDICINE

## 2019-01-01 PROCEDURE — 81003 POCT URINALYSIS, DIPSTICK, AUTOMATED, W/O SCOPE: ICD-10-PCS | Mod: QW,S$GLB,, | Performed by: FAMILY MEDICINE

## 2019-01-01 PROCEDURE — 93306 TTE W/DOPPLER COMPLETE: CPT | Mod: PBBFAC,PO | Performed by: INTERNAL MEDICINE

## 2019-01-01 PROCEDURE — 99213 OFFICE O/P EST LOW 20 MIN: CPT | Mod: PBBFAC,PO | Performed by: UROLOGY

## 2019-01-01 PROCEDURE — 93971 EXTREMITY STUDY: CPT | Mod: TC,PO,LT

## 2019-01-01 PROCEDURE — 99999 PR PBB SHADOW E&M-EST. PATIENT-LVL III: ICD-10-PCS | Mod: PBBFAC,,, | Performed by: UROLOGY

## 2019-01-01 PROCEDURE — 99499 UNLISTED E&M SERVICE: CPT | Mod: S$PBB,,, | Performed by: PODIATRIST

## 2019-01-01 PROCEDURE — 93010 EKG 12-LEAD: ICD-10-PCS | Mod: S$PBB,,, | Performed by: INTERNAL MEDICINE

## 2019-01-01 PROCEDURE — 83690 ASSAY OF LIPASE: CPT

## 2019-01-01 PROCEDURE — 99214 OFFICE O/P EST MOD 30 MIN: CPT | Mod: S$PBB,,, | Performed by: NURSE PRACTITIONER

## 2019-01-01 PROCEDURE — 99999 PR PBB SHADOW E&M-EST. PATIENT-LVL III: ICD-10-PCS | Mod: PBBFAC,,, | Performed by: ANESTHESIOLOGY

## 2019-01-01 PROCEDURE — 99999 PR PBB SHADOW E&M-EST. PATIENT-LVL III: CPT | Mod: PBBFAC,,, | Performed by: UROLOGY

## 2019-01-01 PROCEDURE — 71046 XR CHEST PA AND LATERAL: ICD-10-PCS | Mod: 26,,, | Performed by: RADIOLOGY

## 2019-01-01 PROCEDURE — 99999 PR PBB SHADOW E&M-EST. PATIENT-LVL II: CPT | Mod: PBBFAC,,,

## 2019-01-01 PROCEDURE — 71046 X-RAY EXAM CHEST 2 VIEWS: CPT | Mod: TC,FY,PO

## 2019-01-01 PROCEDURE — 80053 COMPREHEN METABOLIC PANEL: CPT | Mod: PO

## 2019-01-01 PROCEDURE — 99999 PR PBB SHADOW E&M-EST. PATIENT-LVL IV: ICD-10-PCS | Mod: PBBFAC,,, | Performed by: NURSE PRACTITIONER

## 2019-01-01 PROCEDURE — 99214 OFFICE O/P EST MOD 30 MIN: CPT | Mod: PBBFAC,25,PO | Performed by: NURSE PRACTITIONER

## 2019-01-01 PROCEDURE — 77063 MAMMO DIGITAL SCREENING BILAT WITH TOMOSYNTHESIS_CAD: ICD-10-PCS | Mod: 26,,, | Performed by: RADIOLOGY

## 2019-01-01 PROCEDURE — 99999 PR PBB SHADOW E&M-EST. PATIENT-LVL II: ICD-10-PCS | Mod: PBBFAC,,, | Performed by: PODIATRIST

## 2019-01-01 PROCEDURE — 80069 RENAL FUNCTION PANEL: CPT

## 2019-01-01 PROCEDURE — 93306 TRANSTHORACIC ECHO (TTE) COMPLETE (CUPID ONLY): ICD-10-PCS | Mod: 26,S$PBB,, | Performed by: INTERNAL MEDICINE

## 2019-01-01 PROCEDURE — 90791 PR PSYCHIATRIC DIAGNOSTIC EVALUATION: ICD-10-PCS | Mod: ,,, | Performed by: PSYCHOLOGIST

## 2019-01-01 PROCEDURE — 71046 X-RAY EXAM CHEST 2 VIEWS: CPT | Mod: 26,,, | Performed by: RADIOLOGY

## 2019-01-01 PROCEDURE — 80198 ASSAY OF THEOPHYLLINE: CPT

## 2019-01-01 PROCEDURE — 92014 COMPRE OPH EXAM EST PT 1/>: CPT | Mod: S$PBB,,, | Performed by: OPTOMETRIST

## 2019-01-01 PROCEDURE — 99213 OFFICE O/P EST LOW 20 MIN: CPT | Mod: PBBFAC,PO | Performed by: INTERNAL MEDICINE

## 2019-01-01 PROCEDURE — 99204 PR OFFICE/OUTPT VISIT, NEW, LEVL IV, 45-59 MIN: ICD-10-PCS | Mod: S$PBB,,, | Performed by: UROLOGY

## 2019-01-01 PROCEDURE — 11721 DEBRIDE NAIL 6 OR MORE: CPT | Mod: Q9,S$PBB,, | Performed by: PODIATRIST

## 2019-01-01 PROCEDURE — 11721 PR DEBRIDEMENT OF NAILS, 6 OR MORE: ICD-10-PCS | Mod: S$PBB,Q9,, | Performed by: PODIATRIST

## 2019-01-01 PROCEDURE — 99999 PR PBB SHADOW E&M-EST. PATIENT-LVL II: CPT | Mod: PBBFAC,,, | Performed by: INTERNAL MEDICINE

## 2019-01-01 PROCEDURE — 82785 ASSAY OF IGE: CPT

## 2019-01-01 PROCEDURE — 99999 PR PBB SHADOW E&M-EST. PATIENT-LVL II: ICD-10-PCS | Mod: PBBFAC,,, | Performed by: INTERNAL MEDICINE

## 2019-01-01 PROCEDURE — 99214 PR OFFICE/OUTPT VISIT, EST, LEVL IV, 30-39 MIN: ICD-10-PCS | Mod: S$PBB,,, | Performed by: INTERNAL MEDICINE

## 2019-01-01 PROCEDURE — 76700 US EXAM ABDOM COMPLETE: CPT | Mod: 26,,, | Performed by: RADIOLOGY

## 2019-01-01 PROCEDURE — 99214 OFFICE O/P EST MOD 30 MIN: CPT | Mod: S$PBB,,, | Performed by: INTERNAL MEDICINE

## 2019-01-01 PROCEDURE — 86003 ALLG SPEC IGE CRUDE XTRC EA: CPT | Mod: 59

## 2019-01-01 PROCEDURE — 99999 PR PBB SHADOW E&M-EST. PATIENT-LVL III: CPT | Mod: PBBFAC,,, | Performed by: OBSTETRICS & GYNECOLOGY

## 2019-01-01 PROCEDURE — 77080 DXA BONE DENSITY AXIAL: CPT | Mod: 26,,, | Performed by: RADIOLOGY

## 2019-01-01 PROCEDURE — 77080 DEXA BONE DENSITY SPINE HIP: ICD-10-PCS | Mod: 26,,, | Performed by: RADIOLOGY

## 2019-01-01 PROCEDURE — 85025 COMPLETE CBC W/AUTO DIFF WBC: CPT

## 2019-01-01 PROCEDURE — 81002 URINALYSIS NONAUTO W/O SCOPE: CPT | Mod: PBBFAC,PO | Performed by: UROLOGY

## 2019-01-01 PROCEDURE — 99204 OFFICE O/P NEW MOD 45 MIN: CPT | Mod: S$PBB,,, | Performed by: UROLOGY

## 2019-01-01 PROCEDURE — 82150 ASSAY OF AMYLASE: CPT

## 2019-01-01 PROCEDURE — 11721 PR DEBRIDEMENT OF NAILS, 6 OR MORE: ICD-10-PCS | Mod: Q9,S$PBB,, | Performed by: PODIATRIST

## 2019-01-01 PROCEDURE — 99499 NO LOS: ICD-10-PCS | Mod: S$PBB,,, | Performed by: PODIATRIST

## 2019-01-01 PROCEDURE — 99204 OFFICE O/P NEW MOD 45 MIN: CPT | Mod: S$PBB,,, | Performed by: INTERNAL MEDICINE

## 2019-01-01 PROCEDURE — 93971 US LOWER EXTREMITY VEINS LEFT: ICD-10-PCS | Mod: 26,LT,, | Performed by: RADIOLOGY

## 2019-01-01 PROCEDURE — 99999 PR PBB SHADOW E&M-EST. PATIENT-LVL V: CPT | Mod: PBBFAC,,, | Performed by: PODIATRIST

## 2019-01-01 PROCEDURE — 86003 ALLG SPEC IGE CRUDE XTRC EA: CPT

## 2019-01-01 PROCEDURE — 77063 BREAST TOMOSYNTHESIS BI: CPT | Mod: 26,,, | Performed by: RADIOLOGY

## 2019-01-01 PROCEDURE — 99214 OFFICE O/P EST MOD 30 MIN: CPT | Mod: 25,S$GLB,, | Performed by: FAMILY MEDICINE

## 2019-01-01 PROCEDURE — 99213 OFFICE O/P EST LOW 20 MIN: CPT | Mod: PBBFAC,PN | Performed by: ANESTHESIOLOGY

## 2019-01-01 PROCEDURE — 11721 DEBRIDE NAIL 6 OR MORE: CPT | Mod: S$PBB,Q9,, | Performed by: PODIATRIST

## 2019-01-01 PROCEDURE — 99213 OFFICE O/P EST LOW 20 MIN: CPT | Mod: PBBFAC,PO | Performed by: FAMILY MEDICINE

## 2019-01-01 PROCEDURE — 77067 SCR MAMMO BI INCL CAD: CPT | Mod: TC,PO

## 2019-01-01 PROCEDURE — 99999 PR PBB SHADOW E&M-EST. PATIENT-LVL II: CPT | Mod: PBBFAC,,, | Performed by: PODIATRIST

## 2019-01-01 PROCEDURE — 93010 ELECTROCARDIOGRAM REPORT: CPT | Mod: S$PBB,,, | Performed by: INTERNAL MEDICINE

## 2019-01-01 PROCEDURE — 11721 DEBRIDE NAIL 6 OR MORE: CPT | Mod: PBBFAC,PN | Performed by: PODIATRIST

## 2019-01-01 PROCEDURE — 99215 OFFICE O/P EST HI 40 MIN: CPT | Mod: PBBFAC,PO,25 | Performed by: NURSE PRACTITIONER

## 2019-01-01 PROCEDURE — 99215 OFFICE O/P EST HI 40 MIN: CPT | Mod: PBBFAC,PO | Performed by: OPTOMETRIST

## 2019-01-01 PROCEDURE — 92014 PR EYE EXAM, EST PATIENT,COMPREHESV: ICD-10-PCS | Mod: S$PBB,,, | Performed by: OPTOMETRIST

## 2019-01-01 PROCEDURE — 99214 PR OFFICE/OUTPT VISIT, EST, LEVL IV, 30-39 MIN: ICD-10-PCS | Mod: 25,S$GLB,, | Performed by: FAMILY MEDICINE

## 2019-01-01 PROCEDURE — 93005 ELECTROCARDIOGRAM TRACING: CPT | Mod: PBBFAC,PO | Performed by: INTERNAL MEDICINE

## 2019-01-01 PROCEDURE — 99213 OFFICE O/P EST LOW 20 MIN: CPT | Mod: 25,S$PBB,, | Performed by: PODIATRIST

## 2019-01-01 PROCEDURE — 97110 THERAPEUTIC EXERCISES: CPT | Mod: PO | Performed by: PHYSICAL THERAPIST

## 2019-01-01 PROCEDURE — 80053 COMPREHEN METABOLIC PANEL: CPT

## 2019-01-01 PROCEDURE — 99212 OFFICE O/P EST SF 10 MIN: CPT | Mod: PBBFAC,27,PO,25

## 2019-01-01 PROCEDURE — 99214 OFFICE O/P EST MOD 30 MIN: CPT | Mod: 25,S$PBB,, | Performed by: INTERNAL MEDICINE

## 2019-01-01 PROCEDURE — 99999 PR PBB SHADOW E&M-EST. PATIENT-LVL V: CPT | Mod: PBBFAC,,, | Performed by: OPTOMETRIST

## 2019-01-01 PROCEDURE — 77067 MAMMO DIGITAL SCREENING BILAT WITH TOMOSYNTHESIS_CAD: ICD-10-PCS | Mod: 26,,, | Performed by: RADIOLOGY

## 2019-01-01 PROCEDURE — 77080 DXA BONE DENSITY AXIAL: CPT | Mod: TC,PO

## 2019-01-01 PROCEDURE — 99999 PR PBB SHADOW E&M-EST. PATIENT-LVL V: ICD-10-PCS | Mod: PBBFAC,,, | Performed by: PODIATRIST

## 2019-01-01 PROCEDURE — 99999 PR PBB SHADOW E&M-EST. PATIENT-LVL III: CPT | Mod: PBBFAC,,, | Performed by: ANESTHESIOLOGY

## 2019-01-01 PROCEDURE — 99214 PR OFFICE/OUTPT VISIT, EST, LEVL IV, 30-39 MIN: ICD-10-PCS | Mod: 25,S$PBB,, | Performed by: INTERNAL MEDICINE

## 2019-01-01 PROCEDURE — 97162 PT EVAL MOD COMPLEX 30 MIN: CPT | Mod: PO | Performed by: PHYSICAL THERAPIST

## 2019-01-01 PROCEDURE — 99212 OFFICE O/P EST SF 10 MIN: CPT | Mod: PBBFAC,PO | Performed by: INTERNAL MEDICINE

## 2019-01-01 PROCEDURE — G0101 PR CA SCREEN;PELVIC/BREAST EXAM: ICD-10-PCS | Mod: S$PBB,,, | Performed by: OBSTETRICS & GYNECOLOGY

## 2019-01-01 PROCEDURE — 99999 PR PBB SHADOW E&M-EST. PATIENT-LVL V: ICD-10-PCS | Mod: PBBFAC,,, | Performed by: OPTOMETRIST

## 2019-01-01 PROCEDURE — 77067 SCR MAMMO BI INCL CAD: CPT | Mod: 26,,, | Performed by: RADIOLOGY

## 2019-01-01 PROCEDURE — 76700 US ABDOMEN COMPLETE: ICD-10-PCS | Mod: 26,,, | Performed by: RADIOLOGY

## 2019-01-01 PROCEDURE — 99213 PR OFFICE/OUTPT VISIT, EST, LEVL III, 20-29 MIN: ICD-10-PCS | Mod: 25,S$PBB,, | Performed by: PODIATRIST

## 2019-01-01 PROCEDURE — 80069 RENAL FUNCTION PANEL: CPT | Mod: PO

## 2019-01-01 PROCEDURE — 73610 XR ANKLE COMPLETE 3 VIEW LEFT: ICD-10-PCS | Mod: 26,LT,, | Performed by: RADIOLOGY

## 2019-01-01 PROCEDURE — 99999 PR PBB SHADOW E&M-EST. PATIENT-LVL III: ICD-10-PCS | Mod: PBBFAC,,, | Performed by: OBSTETRICS & GYNECOLOGY

## 2019-01-01 PROCEDURE — 76700 US EXAM ABDOM COMPLETE: CPT | Mod: TC,PO

## 2019-01-01 PROCEDURE — 99212 OFFICE O/P EST SF 10 MIN: CPT | Mod: PBBFAC,PN | Performed by: PODIATRIST

## 2019-01-01 PROCEDURE — 99215 OFFICE O/P EST HI 40 MIN: CPT | Mod: S$PBB,,, | Performed by: NURSE PRACTITIONER

## 2019-01-01 PROCEDURE — 99215 PR OFFICE/OUTPT VISIT, EST, LEVL V, 40-54 MIN: ICD-10-PCS | Mod: S$PBB,,, | Performed by: NURSE PRACTITIONER

## 2019-01-01 PROCEDURE — 99204 PR OFFICE/OUTPT VISIT, NEW, LEVL IV, 45-59 MIN: ICD-10-PCS | Mod: S$PBB,,, | Performed by: INTERNAL MEDICINE

## 2019-01-01 PROCEDURE — 93971 EXTREMITY STUDY: CPT | Mod: 26,LT,, | Performed by: RADIOLOGY

## 2019-01-01 PROCEDURE — 99203 OFFICE O/P NEW LOW 30 MIN: CPT | Mod: S$PBB,,, | Performed by: ANESTHESIOLOGY

## 2019-01-01 PROCEDURE — S0119 ONDANSETRON 4 MG: HCPCS | Mod: PBBFAC,PO

## 2019-01-01 PROCEDURE — 90662 IIV NO PRSV INCREASED AG IM: CPT | Mod: PBBFAC,PO

## 2019-01-01 PROCEDURE — 73610 X-RAY EXAM OF ANKLE: CPT | Mod: TC,FY,PO,LT

## 2019-01-01 PROCEDURE — 73610 X-RAY EXAM OF ANKLE: CPT | Mod: 26,LT,, | Performed by: RADIOLOGY

## 2019-01-01 RX ORDER — IPRATROPIUM BROMIDE AND ALBUTEROL SULFATE 2.5; .5 MG/3ML; MG/3ML
3 SOLUTION RESPIRATORY (INHALATION) EVERY 4 HOURS
Refills: 6 | COMMUNITY
Start: 2019-01-01

## 2019-01-01 RX ORDER — GLIPIZIDE 5 MG/1
5 TABLET ORAL
Qty: 180 TABLET | Refills: 0 | Status: SHIPPED | OUTPATIENT
Start: 2019-01-01 | End: 2020-10-30

## 2019-01-01 RX ORDER — FUROSEMIDE 20 MG/1
20 TABLET ORAL 2 TIMES DAILY
Qty: 60 TABLET | Refills: 1 | Status: ON HOLD | OUTPATIENT
Start: 2019-01-01 | End: 2019-01-01 | Stop reason: SDUPTHER

## 2019-01-01 RX ORDER — MOMETASONE FUROATE 220 UG/1
1 INHALANT RESPIRATORY (INHALATION) 2 TIMES DAILY
Refills: 6 | COMMUNITY
Start: 2019-01-01

## 2019-01-01 RX ORDER — HYDROXYZINE HYDROCHLORIDE 25 MG/1
25 TABLET, FILM COATED ORAL NIGHTLY PRN
Qty: 30 TABLET | Refills: 5 | Status: SHIPPED | OUTPATIENT
Start: 2019-01-01 | End: 2019-01-01

## 2019-01-01 RX ORDER — HYDROCHLOROTHIAZIDE 12.5 MG/1
12.5 TABLET ORAL DAILY
Qty: 10 TABLET | Refills: 0 | Status: SHIPPED | OUTPATIENT
Start: 2019-01-01 | End: 2019-01-01

## 2019-01-01 RX ORDER — TRIAMCINOLONE ACETONIDE 1 MG/G
CREAM TOPICAL 2 TIMES DAILY PRN
Qty: 45 G | Refills: 1 | Status: SHIPPED | OUTPATIENT
Start: 2019-01-01

## 2019-01-01 RX ORDER — SODIUM CHLORIDE FOR INHALATION 7 %
4 VIAL, NEBULIZER (ML) INHALATION 3 TIMES DAILY PRN
Refills: 5 | COMMUNITY
Start: 2019-01-01

## 2019-01-01 RX ORDER — ONDANSETRON 8 MG/1
8 TABLET, ORALLY DISINTEGRATING ORAL EVERY 6 HOURS PRN
Qty: 30 TABLET | Refills: 0 | Status: SHIPPED | OUTPATIENT
Start: 2019-01-01 | End: 2019-01-01 | Stop reason: SINTOL

## 2019-01-01 RX ORDER — ONDANSETRON 4 MG/1
4 TABLET, ORALLY DISINTEGRATING ORAL
Status: COMPLETED | OUTPATIENT
Start: 2019-01-01 | End: 2019-01-01

## 2019-01-01 RX ORDER — LISINOPRIL 5 MG/1
TABLET ORAL
Qty: 90 TABLET | Refills: 3 | Status: SHIPPED | OUTPATIENT
Start: 2019-01-01 | End: 2019-01-01

## 2019-01-01 RX ORDER — PROMETHAZINE HYDROCHLORIDE 12.5 MG/1
12.5 TABLET ORAL EVERY 8 HOURS PRN
Refills: 3 | COMMUNITY
Start: 2019-01-01 | End: 2019-01-01

## 2019-01-01 RX ORDER — PANTOPRAZOLE SODIUM 40 MG/1
40 TABLET, DELAYED RELEASE ORAL DAILY
Qty: 90 TABLET | Refills: 0 | Status: SHIPPED | OUTPATIENT
Start: 2019-01-01 | End: 2020-10-13

## 2019-01-01 RX ORDER — NYSTATIN 100000 [USP'U]/ML
SUSPENSION ORAL
Refills: 1 | COMMUNITY
Start: 2019-01-01 | End: 2019-01-01

## 2019-01-01 RX ORDER — METFORMIN HYDROCHLORIDE 500 MG/1
500 TABLET, EXTENDED RELEASE ORAL 2 TIMES DAILY WITH MEALS
Qty: 180 TABLET | Refills: 0 | Status: SHIPPED | OUTPATIENT
Start: 2019-01-01 | End: 2019-01-01 | Stop reason: SDUPTHER

## 2019-01-01 RX ORDER — NITROGLYCERIN 0.4 MG/1
0.4 TABLET SUBLINGUAL EVERY 5 MIN PRN
Qty: 30 TABLET | Refills: 4 | Status: SHIPPED | OUTPATIENT
Start: 2019-01-01

## 2019-01-01 RX ORDER — GLIPIZIDE 5 MG/1
5 TABLET ORAL
Qty: 180 TABLET | Refills: 0 | Status: SHIPPED | OUTPATIENT
Start: 2019-01-01 | End: 2019-01-01 | Stop reason: SDUPTHER

## 2019-01-01 RX ORDER — FUROSEMIDE 40 MG/1
40 TABLET ORAL 2 TIMES DAILY
Qty: 75 TABLET | Refills: 11 | OUTPATIENT
Start: 2019-01-01

## 2019-01-01 RX ORDER — METFORMIN HYDROCHLORIDE 500 MG/1
TABLET, EXTENDED RELEASE ORAL
Qty: 180 TABLET | Refills: 0 | Status: SHIPPED | OUTPATIENT
Start: 2019-01-01 | End: 2019-01-01

## 2019-01-01 RX ORDER — FUROSEMIDE 20 MG/1
1 TABLET ORAL DAILY
Refills: 0 | COMMUNITY
Start: 2019-01-01 | End: 2019-01-01 | Stop reason: SDUPTHER

## 2019-01-01 RX ORDER — NITROFURANTOIN 25; 75 MG/1; MG/1
100 CAPSULE ORAL 2 TIMES DAILY
Qty: 10 CAPSULE | Refills: 0 | Status: SHIPPED | OUTPATIENT
Start: 2019-01-01 | End: 2019-01-01 | Stop reason: ALTCHOICE

## 2019-01-01 RX ORDER — TIZANIDINE 4 MG/1
4 TABLET ORAL EVERY 8 HOURS
Status: ON HOLD | COMMUNITY
Start: 2019-01-01 | End: 2019-01-01 | Stop reason: SDUPTHER

## 2019-01-01 RX ORDER — HYDROCHLOROTHIAZIDE 12.5 MG/1
TABLET ORAL
Qty: 10 TABLET | Refills: 0 | OUTPATIENT
Start: 2019-01-01

## 2019-01-01 RX ORDER — MOMETASONE FUROATE 220 UG/1
INHALANT RESPIRATORY (INHALATION)
COMMUNITY
Start: 2019-01-01 | End: 2019-01-01

## 2019-01-01 RX ORDER — SULFAMETHOXAZOLE AND TRIMETHOPRIM 800; 160 MG/1; MG/1
1 TABLET ORAL 2 TIMES DAILY
Qty: 20 TABLET | Refills: 0 | Status: SHIPPED | OUTPATIENT
Start: 2019-01-01 | End: 2019-01-01

## 2019-01-01 RX ORDER — METFORMIN HYDROCHLORIDE 500 MG/1
TABLET, EXTENDED RELEASE ORAL
Qty: 180 TABLET | Refills: 0 | Status: SHIPPED | OUTPATIENT
Start: 2019-01-01

## 2019-01-01 RX ORDER — DOXYCYCLINE 100 MG/1
100 CAPSULE ORAL EVERY 12 HOURS
Qty: 14 CAPSULE | Refills: 0 | Status: SHIPPED | OUTPATIENT
Start: 2019-01-01 | End: 2019-01-01

## 2019-01-01 RX ORDER — DOXYCYCLINE HYCLATE 100 MG
100 TABLET ORAL 2 TIMES DAILY
Qty: 20 TABLET | Refills: 0 | Status: SHIPPED | OUTPATIENT
Start: 2019-01-01 | End: 2019-01-01 | Stop reason: ALTCHOICE

## 2019-01-01 RX ORDER — FUROSEMIDE 20 MG/1
TABLET ORAL
Qty: 60 TABLET | Refills: 1 | OUTPATIENT
Start: 2019-01-01

## 2019-01-01 RX ORDER — LISINOPRIL 5 MG/1
2.5 TABLET ORAL DAILY
Qty: 45 TABLET | Refills: 3
Start: 2019-01-01 | End: 2019-01-01

## 2019-01-01 RX ORDER — ROFLUMILAST 250 UG/1
TABLET ORAL
COMMUNITY
End: 2019-01-01

## 2019-01-01 RX ORDER — METOPROLOL TARTRATE 25 MG/1
TABLET, FILM COATED ORAL
Qty: 180 TABLET | Refills: 3 | Status: SHIPPED | OUTPATIENT
Start: 2019-01-01

## 2019-01-01 RX ORDER — PANTOPRAZOLE SODIUM 40 MG/1
40 TABLET, DELAYED RELEASE ORAL DAILY
Qty: 90 TABLET | Refills: 0 | Status: SHIPPED | OUTPATIENT
Start: 2019-01-01 | End: 2019-01-01 | Stop reason: SDUPTHER

## 2019-01-01 RX ADMIN — ONDANSETRON 4 MG: 4 TABLET, ORALLY DISINTEGRATING ORAL at 08:08

## 2019-01-01 RX ADMIN — ONDANSETRON 4 MG: 4 TABLET, ORALLY DISINTEGRATING ORAL at 09:08

## 2019-01-02 NOTE — PROGRESS NOTES
Subjective:       Patient ID: Tiana Bosch is a 68 y.o. female.    Chief Complaint: Dizziness  She was last seen in primary care by me on 12/20/2018.  She last saw Faisal on 11/06/2018.   She is accompanied by her spouse  HPI   She is here with complaints of dizziness. States feels faint with standing and has had no actual syncopal episodes or falls since last visit.   Vitals:    01/02/19 0744   BP: 120/60   Pulse: 82   Resp: 18   Temp: 98.5 °F (36.9 °C)     BP Readings from Last 3 Encounters:   01/02/19 120/60   12/20/18 126/68   12/17/18 (!) 136/47     Pulse Readings from Last 3 Encounters:   01/02/19 82   12/20/18 83   12/17/18 80     Review of Systems   Neurological: Positive for dizziness.       Home blood pressure readings   1/1/2019 -104/41  Today at home 104/50 and 100/51   states her blood oxygen levels stays between 92-95 at home  I have reviewed her medication list and discussed the possibility of dizziness from medication usage.   CMP  Sodium   Date Value Ref Range Status   12/17/2018 139 136 - 145 mmol/L Final     Potassium   Date Value Ref Range Status   12/17/2018 5.6 (H) 3.5 - 5.1 mmol/L Final     Comment:     Slight hemolysis     Chloride   Date Value Ref Range Status   12/17/2018 102 95 - 110 mmol/L Final     CO2   Date Value Ref Range Status   12/17/2018 23 22 - 31 mmol/L Final     Glucose   Date Value Ref Range Status   12/17/2018 216 (H) 70 - 110 mg/dL Final     Comment:     The ADA recommends the following guidelines for fasting glucose:  Normal:       less than 100 mg/dL  Prediabetes:  100 mg/dL to 125 mg/dL  Diabetes:     126 mg/dL or higher       BUN, Bld   Date Value Ref Range Status   12/17/2018 23 (H) 7 - 18 mg/dL Final     Creatinine   Date Value Ref Range Status   12/17/2018 0.90 0.50 - 1.40 mg/dL Final     Calcium   Date Value Ref Range Status   12/17/2018 9.8 8.4 - 10.2 mg/dL Final     Total Protein   Date Value Ref Range Status   12/17/2018 7.8 6.0 - 8.4 g/dL Final      Albumin   Date Value Ref Range Status   12/17/2018 4.6 3.5 - 5.2 g/dL Final     Total Bilirubin   Date Value Ref Range Status   12/17/2018 0.5 0.2 - 1.3 mg/dL Final     Alkaline Phosphatase   Date Value Ref Range Status   12/17/2018 136 38 - 145 U/L Final     AST   Date Value Ref Range Status   12/17/2018 37 (H) 14 - 36 U/L Final     ALT   Date Value Ref Range Status   12/17/2018 27 10 - 44 U/L Final     Anion Gap   Date Value Ref Range Status   12/17/2018 14 8 - 16 mmol/L Final     eGFR if    Date Value Ref Range Status   12/17/2018 >60 >60 mL/min/1.73 m^2 Final     eGFR if non    Date Value Ref Range Status   12/17/2018 >60 >60 mL/min/1.73 m^2 Final     Comment:     Calculation used to obtain the estimated glomerular filtration  rate (eGFR) is the CKD-EPI equation.        Lab Results   Component Value Date    WBC 8.59 12/17/2018    HGB 12.9 12/17/2018    HCT 38.7 12/17/2018    MCV 95 12/17/2018     12/17/2018     Lab Results   Component Value Date    HGBA1C 7.3 (H) 10/12/2018     Objective:      Physical Exam   Constitutional: She is oriented to person, place, and time. Vital signs are normal. She appears well-developed and well-nourished. She is cooperative.   She is using oxygen continuously   HENT:   Head: Normocephalic and atraumatic.   Right Ear: Hearing, tympanic membrane, external ear and ear canal normal.   Left Ear: Hearing, tympanic membrane, external ear and ear canal normal.   Nose: Nose normal.   Mouth/Throat: Uvula is midline, oropharynx is clear and moist and mucous membranes are normal.   Eyes: Lids are normal.   Neck: Trachea normal, normal range of motion, full passive range of motion without pain and phonation normal. Neck supple. Carotid bruit is not present.   Cardiovascular: Normal rate and regular rhythm.   Pulmonary/Chest: Effort normal and breath sounds normal.   Abdominal: Soft. There is no tenderness.   Musculoskeletal: Normal range of motion.    Lymphadenopathy:        Head (right side): No submental, no submandibular, no tonsillar, no preauricular, no posterior auricular and no occipital adenopathy present.        Head (left side): No submental, no submandibular, no tonsillar, no preauricular, no posterior auricular and no occipital adenopathy present.     She has no cervical adenopathy.   Neurological: She is alert and oriented to person, place, and time.   Skin: Skin is warm, dry and intact.   Psychiatric: She has a normal mood and affect. Her speech is normal and behavior is normal. Judgment and thought content normal. Cognition and memory are normal.   Nursing note and vitals reviewed.      Assessment & Plan:       Dizziness - medication evaluation and consideration to not take cyclobenzaprine and Neurontin together, Pt will discuss with pain management provider.     Hyperkalemia  -     Potassium; Future; Expected date: 01/02/2019    Essential hypertension - Stable Zestril    Type 2 diabetes mellitus with diabetic polyneuropathy, without long-term current use of insulin - Stable glucotrol, metformin    On home oxygen therapy - SPO2 stable    Other depression - Stable Lexapro    Make sure that someone is around when you are getting up and when going to shower  Please discuss the usage of flexeril and Neurontin together with you pain management provider  I am recommending that you do not take those together because there effect together can cause this dizziness       Medication List           Accurate as of 1/2/19  8:23 AM. If you have any questions, ask your nurse or doctor.               CHANGE how you take these medications    fluocinolone acetonide oil 0.01 % Drop  INSTILL THREE DROPS INTO AFFECTED EAR TWICE DAILY  What changed:    · how much to take  · how to take this  · when to take this  · reasons to take this  · additional instructions     fluticasone 50 mcg/actuation nasal spray  Commonly known as:  FLONASE  1 spray (50 mcg total) by Each  Nare route once daily.  What changed:    · when to take this  · reasons to take this     ipratropium 42 mcg (0.06 %) nasal spray  Commonly known as:  ATROVENT  2 sprays by Nasal route 4 (four) times daily. As needed for rhinitis  What changed:    · when to take this  · reasons to take this  · additional instructions     sucralfate 1 gram tablet  Commonly known as:  CARAFATE  Take 1 tablet (1 g total) by mouth 4 (four) times daily. dissolve in 10 mL of water and swallow  What changed:  Another medication with the same name was removed. Continue taking this medication, and follow the directions you see here.  Changed by:  Talita Childs, DNP, APRN        CONTINUE taking these medications    ALBUTEROL INHL     aspirin 81 MG EC tablet  Commonly known as:  ECOTRIN  Take 1 tablet (81 mg total) by mouth once daily.     BEVESPI AEROSPHERE 9-4.8 mcg Hfaa  Generic drug:  glycopyrrolate-formoterol     blood sugar diagnostic Strp  Commonly known as:  ONETOUCH ULTRA TEST  TEST ONE TIME AMAYA.     blood-glucose meter kit  Use as instructed     calcium carbonate 500 mg calcium (1,250 mg) tablet  Commonly known as:  OS-RENALDO     CHILDREN'S ALLEGRA ALLERGY ORAL     cyclobenzaprine 10 MG tablet  Commonly known as:  FLEXERIL  TAKE 1 TABLET BY MOUTH 3 TIMES DAILY     escitalopram oxalate 10 MG tablet  Commonly known as:  LEXAPRO  Take 1 tablet (10 mg total) by mouth once daily.     gabapentin 400 MG capsule  Commonly known as:  NEURONTIN  Take two capsules in the morning and afternoon and three capsules at night.     glipiZIDE 5 MG tablet  Commonly known as:  GLUCOTROL  Take 1 tablet (5 mg total) by mouth 2 (two) times daily before meals.     lancets 33 gauge Misc  Commonly known as:  ONETOUCH DELICA LANCETS  1 lancet by Misc.(Non-Drug; Combo Route) route Daily. Use to     lisinopril 5 MG tablet  Commonly known as:  PRINIVIL,ZESTRIL  Take 1 tablet (5 mg total) by mouth once daily.     metFORMIN 500 MG 24 hr tablet  Commonly known as:   GLUCOPHAGE-XR  Take 1 tablet (500 mg total) by mouth 2 (two) times daily with meals.     metoprolol tartrate 25 MG tablet  Commonly known as:  LOPRESSOR  Take 1 tablet (25 mg total) by mouth 2 (two) times daily.     mometasone 220 mcg (60 doses) Aepb  Commonly known as:  ASMANEX TWISTHALER  Inhale 2 puffs into the lungs once daily. Controller     nitroGLYCERIN 0.4 MG SL tablet  Commonly known as:  NITROSTAT  Place 1 tablet (0.4 mg total) under the tongue every 5 (five) minutes as needed for Chest pain.     OPTICHAMBER MOOSE [facility]  Generic drug:  inhalation spacing device  USE AS DIRECTED FOR INHALATION. PLEASE RUN FOR INSURANCE PREFERRED SPACER AND PT PREFERRED SIZE.     pantoprazole 40 MG tablet  Commonly known as:  PROTONIX  Take 1 tablet (40 mg total) by mouth once daily.     traMADol 50 mg tablet  Commonly known as:  ULTRAM  Take 2 tablets (100 mg total) by mouth every 8 (eight) hours as needed.        STOP taking these medications    predniSONE 10 MG tablet  Commonly known as:  DELTASONE  Stopped by:  Talita Childs DNP, APRN              Follow-up if symptoms worsen or fail to improve.

## 2019-01-02 NOTE — PATIENT INSTRUCTIONS
Make sure that someone is around when you are getting up and when going to shower  Please discuss the usage of flexeril and neurontin together with you pain management provider  I am recommending that you do not take those together because there effect together can cause this dizziness

## 2019-01-03 NOTE — PROGRESS NOTES
01/03/2019  Summary: (2nd Attempt)  RN-CM completed chart review prior to attempting to contact patient to complete initial assessment for OPCM. Patient currently in the ED at [facility] for urinary retention. ED charting suggests that patient will be discharged from the ED and not admitted today. Will mail attempt letter requesting return call to patient's home address. Will allow time for patient to receive letter. If no response by 1/11/19, will plan to close case. - JOSE Candelario RN-OPCM    Interventions:  - Mailed attempt letter to patient's home address as well as through the patient portal.     Plan:  - If no response by 1/11/19, will plan to close case.

## 2019-01-03 NOTE — PROGRESS NOTES
"Subjective:       Patient ID: Tiana Bosch is a 68 y.o. female.    Vitals:  height is 5' 2" (1.575 m) and weight is 75.3 kg (166 lb). Her oral temperature is 98.8 °F (37.1 °C). Her blood pressure is 123/60 and her pulse is 91. Her respiration is 18 and oxygen saturation is 91 (abnormal)%.     Chief Complaint: Urinary Retention    Pt c/o of urinary retention since this morning  No known injury  Bowel movement if the consistant      Urinary Tract Infection    This is a new problem. The current episode started today. The problem has been unchanged.     Patient presents with acute onset anuria.  She lateral urinated approximately 7 hr ago.  She has no urge to urinate.  And states this is very unusual for her.  She believes she may have urinary retention, however she denies suprapubic pain or fullness.  Her only recent medicine changes recently stopping cyclobenzaprine.  She denies a prior history of acute acute or chronic renal failure and she denies vomiting diarrhea or inability to hydrate.    Constitution: Negative for fever.   Neck: Negative for painful lymph nodes.   Gastrointestinal: Positive for abdominal pain and abdominal bloating.   Genitourinary: Positive for urine decreased. Negative for bed wetting, history of kidney stones, painful menstruation, irregular menstruation, missed menses, heavy menstrual bleeding, ovarian cysts, genital trauma, vaginal pain, vaginal discharge, vaginal bleeding, vaginal odor, painful intercourse, genital sore, painful ejaculation and pelvic pain.   Musculoskeletal: Negative for back pain.   Skin: Negative for lesion.   Hematologic/Lymphatic: Negative for swollen lymph nodes.       Objective:      Physical Exam   Constitutional: She appears well-developed and well-nourished. No distress.   Cardiovascular: Regular rhythm.   Pulmonary/Chest: Breath sounds normal.   Abdominal: Soft.   Skin: She is not diaphoretic.       Assessment:       1. Anuria and oliguria        Plan:     "     Anuria and oliguria     Explained to the patient that her symptoms are actually more suggestive of a lack of urine production since she does not have any urge to urinate.  She is unable to produce a urine specimen here today.  I have explained to her that this may represent acute kidney failure or some unusual type of bladder obstruction but that I do not have the resources to evaluate her problem adequately I have offered to arrange ambulance transfer to the emergency room.  Her family is with her and they feel comfortable bringing her later this morning.  I believe she is stable for private car transfer.

## 2019-01-03 NOTE — LETTER
January 3, 2019    Tiana Bosch  [address]  [place] [address]             [facility] [address] [facility] [address] Dear Mrs. Bosch,    I work with [facility] Outpatient Case Management Department. We received a referral to call you to discuss your medical history. These services are free of charge and are offered to [facility] patients who have recently been discharged from any of our facilities or who have complex medical conditions that may require the skill of a nurse to assist with management. When we receive a referral, we attempt to contact you to go over some health questions with you in order to determine how we can best assist you with your health care needs. I attempted to reach you by telephone, but was unsuccessful.    I am a Registered Nurse who specializes in connecting patients with available medical and financial resources as well as addressing any educational needs that may be indicated. We also have a  on our team of providers that is available to assist with any social or mental health needs that may be indicated. Our department works closely with the [facility] to help you manage your health condition(s) safely within your living environment. Our goal is to help you function at the healthiest and highest level possible.     If you would like to enroll in this free program, please give me a call at the contact number provided below. We will go over some questions regarding your health in order to determine how we can best assist you with your health care needs. This is a voluntary program and once enrolled, you may dis-enroll at any time.     I can be reached directly at [phone] from 8:00AM to 4:30PM, Monday through Friday. The general Outpatient Case Management Department can be reached at [phone] from 8:00AM to 4:30PM, Monday through Friday. [facility] also has a program where a nurse is available 24/7 to answer questions or  provide medical advice. That number is [phone]. Please feel free to contact us for any questions or concerns.    Kind Regards,        Renetta Candelario, RN  Outpatient Case Management  [phone]

## 2019-01-03 NOTE — TELEPHONE ENCOUNTER
----- Message from Marianna Carrillo sent at 1/3/2019 12:42 PM CST -----  Contact: Patient  Type:  Sooner Apoointment Request    Caller is requesting a sooner appointment.  Caller declined first available appointment listed below.  Caller will not accept being placed on the waitlist and is requesting a message be sent to doctor.    Name of Caller: Patient  When is the first available appointment?  1/14/19  Best Call Back Number: [phone]  Additional Information:    Patient was seen in [facility] ER because she could not urinate. A castro was inserted, and they told her to keep the castro until she could see a urologist.  They recommended Dr. Castillo, and she would like an appointment as soon as possible.    Please call to advise  Thank you

## 2019-01-09 NOTE — PROGRESS NOTES
Pt was placed on table in supine position draped appropriately and catheter was then removed and pt was able to site up. Pt tolerated procedure well.

## 2019-01-11 NOTE — TELEPHONE ENCOUNTER
----- Message from Nancie Polo sent at 1/11/2019  8:31 AM CST -----  Contact: Patient  Type:  Patient Returning Call    Who Called:  Patient  Who Left Message for Patient:  Nurse  Does the patient know what this is regarding?:  Scheduling an appt  Best Call Back Number:    Additional Information:  Call to pod. No answer.

## 2019-01-11 NOTE — PROGRESS NOTES
01/11/2019  Summary:  (3rd Attempt)  RN-CM attempted to contact patient to complete initial screen for OPCM. Called [phone] times 2; received immediate busy signal on both attempts with no ability to leave voice mail message. Mailed attempt letter to patient's home address on 1/3/19 and sent letter to patient through patient portal. As this was my 3rd attempt to reach patient and patient has not responded to letter, this RN will close case today. Previously sent letter contains contact information for OPCM and OOC and encourages patient to call OPCM if any needs arise in the future that we could assist her with. - JOSE Blanchard RN-OPCM    Interventions:  - Closed case due to inability to make contact with patient.

## 2019-01-29 PROBLEM — J96.21 ACUTE ON CHRONIC RESPIRATORY FAILURE WITH HYPOXEMIA: Status: RESOLVED | Noted: 2018-01-01 | Resolved: 2019-01-01

## 2019-01-29 NOTE — TELEPHONE ENCOUNTER
Spoke to pt and she is going to come at 3pm. All of tesha pt are showing up for there apt and he has no cancellations this afternoon.

## 2019-01-29 NOTE — TELEPHONE ENCOUNTER
----- Message from Marcy Childs sent at 1/29/2019 12:48 PM CST -----  Type:  Same Day Appointment Request    Caller is requesting a same day appointment.  Caller declined first available appointment listed below.      Name of Caller: self   When is the first available appointment?  Symptoms: NA  Best Call Back Number:  [phone] Additional Information:   Patient is in town now, asking if she can be seen earlier

## 2019-01-29 NOTE — PROGRESS NOTES
UROLOGY SKY  1 29 19    Cc urinary retention    Age 68, comes in with family member. Pt presented an acute urinary retention last month, when she suddenly and unexpectedly had difficulty voiding. In the hospital they needed to catheterize her and obtained 600 ml. She was requested to keep the castro for 6 days. After that the urology nurses removed the catheter and she has been voiding well.     No urinary complaints at this time.       [facility]    Surgical:  has a past surgical history that includes Hysterectomy; Appendectomy; Kidney stone surgery (2004); Appendectomy; Hysterectomy; Eye surgery; Cardiac Stents (4/17/15); Cataract extraction (Bilateral); Oophorectomy; Cardiac surgery; magnetic resonance imaging (N/A, 7/31/2018); and right heart catheterization (Right, 10/25/2018).    Medical:  has a past medical history of Allergic rhinitis, AMD (age-related macular degeneration), bilateral, Anxiety, Arthritis, Carpal tunnel syndrome, Chronic bronchitis, COPD (chronic obstructive pulmonary disease), Coronary artery disease, Diabetes mellitus type 2, diet-controlled, Diabetic retinopathy, Fibromyalgia, History of endometriosis, History of kidney stones, History of pneumonia, Hyperlipidemia, Hypertension, Lichen planus, Myocardial infarction, Neuropathy, Oxygen dependent, and Trauma.    Familial: father had throat cancer, mother with macular degeneration, sister with diabetes    Social: , lives in [place]    Meds:   Current Outpatient Medications on File Prior to Visit   Medication Sig Dispense Refill    ALBUTEROL INHL Inhale 2 puffs into       aspirin (ECOTRIN) 81 MG EC tablet Take 1 tablet (81 mg total) by m  0    blood sugar diagnostic (ONETOUCH ULTRA TE TEST ONE TIME AMAYA. 100 strip 3    blood-glucose meter kit Use as instructed 1 each 0    calcium carbonate (OS-RENALDO) 500 mg calcium (1,250 mg) tablet Take 1 tablet by mouth once daily.      escitalopram oxalate (LEXAPRO) 10 MG tablet Take 1 tablet  (10 mg total) by mouth once  30 tablet 3    fexofenadine HCl (CHILDREN'S ALLEGRA ALLERGY ORAL) Take 1 tablet by mouth every evening.      fluocinolone acetonide oil 0.01 % Drop INSTILL THREE TREVER 20 mL 0    fluticasone (FLONASE) 50 mcg/actuation nasal spray 1 spray (50 mcg total) by Each N 1 Bottle 1    gabapentin (NEURONTIN) 400 MG capsule Take two capsue capsules at night.) 210 capsule 6    glipiZIDE (GLUCOTROL) 5 Take 1 tablet (5 mg t 180 tablet 1    glycopyrrolate-formoterol (BEVESPI AEROSPHERE) 9-4.8 mcg HFAA Inhale 1 puff into the lungs 2 (two) times daily. Controller       ipratropium (ATROVENT) 0.06 % nasal spray 2 sprays by Nasal route 4  15 mL 11    lancets (ONETOUCH DELICA LANCETS) 33 gauge Misc 1 lancet by Misc.(Non-Dr 100 each 3    lisinopril (PRINIVIL,ZESTRIL) 5 MG tablet Take 1 tablet (5 mg total) by mouth  90 tablet 3    metFORMIN (GLUCOPHAGE-XR) 500 MG 24 hr tablet Take 1 tablet (500 mg total) by mouth 2 (two) times 180 tablet 0    metoprolol tartrate (LOPRESSOR) 25 MG tablet Take 1 tablet (25 mg total) by mouth 180 tablet 3    mometasone (ASMANEX TWISTHALER) 220 mcg (60 doses) AePB Inhale 2 puffs into the lungs once daily. Controller 1 each 1    nitroGLYCERIN (NITROSTAT) 0.4 MG SL tablet Place 1 tablet (0.4 mg tot. 30 tablet 4    OPTICHAMBER GAURI USE AS DIRECT 1 each 0    pantoprazole (PROTONIX) 40 MG tablet Take 1 tablet (40 mg total) by mouth  90 tablet 0    sucralfate (CARAFATE) 1 gram tablet Take 1 tablet (1 g total) by mouth 30 tablet 1    tramadol (ULTRAM) 50 mg tablet Take 2 tablets (100 mg total) by  90 tablet 0     REVIEW OF SYSTEMS  GENERAL:  No headaches or dizzy spells.   HEENT: vision Wears glasses. Sinuses: No complaints.   CARDIOPULMONARY: No swelling of the legs; no chest pain. No shortness of breath, no wheezing.   GASTROINTESTINAL: has heartburn. Denies diarrhea; denies constipation, no blood or mucus in stools.   GENITOURINARY: had urinary retention. Currently no  dysuria, bleeding or incontinence.   MUSCULOSKELETAL: No arthritic complaints such as pain or stiffness.   PSYCHIATRIC: No history of depression or anxiety.   ENDOCRINOLOGIC: has diabetes. No reports of sweating, cold or heat intolerance  NEUROLOGICAL: No dizziness, syncope, paralysis  LYMPHATICS: No enlarged nodes. No history of splenectomy.  ==    Pt alert, oriented, on oxygen  HEENT: wnl.  Neck: supple, no JVD, no lymphadenopathy  Chest: CV NSR  Lungs: normal chest expansion, no labored breathing  Abdomen prominent, nontender, no organomegaly, no masses.  Extremities: no edema, peripheral pulses nl  Neuro: preserved    IMP  Urinary retention, transient  Diabetes, severe  Discussed possible peripheral neuropathy from the diabetes, neurogenic bladder, etc. In such a case might need self catheterization, but for the time being there is no need for that.    Pt and  reassured, RTC prn

## 2019-01-29 NOTE — PROGRESS NOTES
Subjective:       Patient ID: Tiana Bosch is a 68 y.o. female.    Chief Complaint: near syncope    Here for f/u dizziness. Had cut back on gabapentin and stopped flexeril with little benefit.  To see urology today for retention.  They have checked bp when she had this feeling and was 90s/50s. Sugar stable when checked.      Hypertension   This is a chronic problem. The current episode started more than 1 year ago. The problem is controlled. Pertinent negatives include no chest pain, palpitations or shortness of breath.   Diabetes   She presents for her follow-up diabetic visit. She has type 2 diabetes mellitus. Pertinent negatives for hypoglycemia include no nervousness/anxiousness. Pertinent negatives for diabetes include no chest pain and no fatigue. Symptoms are stable.     Review of Systems   Constitutional: Negative for chills, fatigue and fever.   Respiratory: Negative for cough, chest tightness and shortness of breath.    Cardiovascular: Negative for chest pain, palpitations and leg swelling.   Endocrine: Negative for cold intolerance and heat intolerance.   Skin: Negative for rash.   Psychiatric/Behavioral: Negative for dysphoric mood. The patient is not nervous/anxious.        Objective:      Physical Exam   Constitutional: She appears well-developed and well-nourished.   HENT:   Head: Normocephalic and atraumatic.   Cardiovascular: Normal rate, regular rhythm and normal heart sounds.   Pulmonary/Chest: Effort normal and breath sounds normal.   Psychiatric: She has a normal mood and affect.   Nursing note and vitals reviewed.      Assessment:       1. Essential hypertension    2. COPD exacerbation    3. Type 2 diabetes mellitus with diabetic polyneuropathy, without long-term current use of insulin    4. Hyperlipidemia LDL goal <70        Plan:       Essential hypertension    COPD exacerbation    Type 2 diabetes mellitus with diabetic polyneuropathy, without long-term current use of  insulin    Hyperlipidemia LDL goal <70        Hold lisinopril for now.  Home bp log and make sure she is staying hydrated.  She plans to cut back tramadol and wean off gabapentin as well.    Follow-up in about 2 weeks (around 2/12/2019), or if symptoms worsen or fail to improve.

## 2019-01-30 NOTE — TELEPHONE ENCOUNTER
----- Message from Michelle Sr sent at 1/29/2019 12:48 PM CST -----  Regarding: schedule appointment  Contact: [phone]  This patient saw Dr. Malone today and he wants to see her back in 2 weeks. I tried to book Feb. 13 at 2pm, but was unable to. If you can not book this day and time for this patient please contact her with an appointment equivalent to this.    Thank you
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
## 2019-02-13 NOTE — PROGRESS NOTES
Subjective:       Patient ID: Tiana Bosch is a 68 y.o. female.    Chief Complaint: Follow-up (2 week f/u, meds)  Last seen in primary care by Faisal on 01/29/2019.  I last saw her on 01/02/2019.  She is accompanied by her .  HPI   States when stands or when get up from sitting feels like going to pass out.  She did stop the flexeril and did stop Neurontin until her pain management instructed to restart.   States has been holding lisinopril and continues to have the same feeling.   She has had TTE in October 2018 and a cardiac cath and she had no significant findings.   Vitals:    02/13/19 1447   BP: (!) 150/80   Pulse: 98   Temp:      BP Readings from Last 3 Encounters:   02/13/19 (!) 150/80   02/04/19 139/77   01/29/19 (!) 148/67     Pulse Readings from Last 3 Encounters:   02/13/19 98   02/04/19 92   01/29/19 91     Review of Systems    Lab Results   Component Value Date    HGBA1C 7.3 (H) 10/12/2018     She has lost 8 pounds since 12/20/2019  Elevated blood pressure reading when going from lying to sitting, SPO2 and Heart rate remains steady when moving from sitting, to lying and standing.  States she is anxious about not being able to do the things she could normally do.   Objective:      Physical Exam   Constitutional: She is oriented to person, place, and time. She appears well-developed and well-nourished. She is active and cooperative.   HENT:   Head: Normocephalic and atraumatic.   Right Ear: Hearing and external ear normal.   Left Ear: Hearing and external ear normal.   Nose: Nose normal.   Mouth/Throat: Uvula is midline, oropharynx is clear and moist and mucous membranes are normal.   Eyes: Lids are normal.   Neck: Trachea normal, normal range of motion, full passive range of motion without pain and phonation normal. Neck supple.   Cardiovascular: Normal rate, regular rhythm and normal heart sounds.   Pulmonary/Chest: Effort normal and breath sounds normal.   Abdominal: Soft. Bowel sounds are  "normal. There is no tenderness.   Musculoskeletal: Normal range of motion.   Lymphadenopathy:        Head (right side): No submental, no submandibular, no tonsillar, no preauricular, no posterior auricular and no occipital adenopathy present.        Head (left side): No submental, no submandibular, no tonsillar, no preauricular, no posterior auricular and no occipital adenopathy present.     She has no cervical adenopathy.   Neurological: She is alert and oriented to person, place, and time.   Skin: Skin is warm, dry and intact.   Psychiatric: She has a normal mood and affect. Her speech is normal and behavior is normal. Judgment and thought content normal. Cognition and memory are normal.   Nursing note and vitals reviewed.      Assessment & Plan:       Hypertension, unspecified type  -     Orthostatic blood pressure  -     lisinopril (PRINIVIL,ZESTRIL) 5 MG tablet; Take 0.5 tablets (2.5 mg total) by mouth once daily.  Dispense: 45 tablet; Refill: 3    COPD exacerbation - Continue follow up with pulmonology    PAH (pulmonary artery hypertension) - Managed by cardio, continue follow up    Type 2 diabetes mellitus with diabetic polyneuropathy, without long-term current use of insulin - Stable HGBA1C 7.3     states she gets "suicidal with prior" anti anxiety/SSRI medications. States she also could not take the Buspar  Medication List with Changes/Refills   Current Medications    ALBUTEROL INHL    Inhale 2 puffs into the lungs as needed.     ASPIRIN (ECOTRIN) 81 MG EC TABLET    Take 1 tablet (81 mg total) by mouth once daily.    BLOOD SUGAR DIAGNOSTIC (ONETOUCH ULTRA TEST) STRP    TEST ONE TIME AMAYA.    BLOOD-GLUCOSE METER KIT    Use as instructed    CALCIUM CARBONATE (OS-RENALDO) 500 MG CALCIUM (1,250 MG) TABLET    Take 1 tablet by mouth once daily.    FEXOFENADINE HCL (CHILDREN'S ALLEGRA ALLERGY ORAL)    Take 1 tablet by mouth every evening.    FLUOCINOLONE ACETONIDE OIL 0.01 % DROP    INSTILL THREE DROPS INTO " AFFECTED EAR TWICE DAILY    FLUTICASONE (FLONASE) 50 MCG/ACTUATION NASAL SPRAY    1 spray (50 mcg total) by Each Nare route once daily.    GABAPENTIN (NEURONTIN) 400 MG CAPSULE    Take two capsules in the morning and afternoon and three capsules at night.    GLIPIZIDE (GLUCOTROL) 5 MG TABLET    Take 1 tablet (5 mg total) by mouth 2 (two) times daily before meals.    GLYCOPYRROLATE-FORMOTEROL (BEVESPI AEROSPHERE) 9-4.8 MCG HFAA    Inhale 1 puff into the lungs 2 (two) times daily. Controller     IPRATROPIUM (ATROVENT) 0.06 % NASAL SPRAY    2 sprays by Nasal route 4 (four) times daily. As needed for rhinitis    LANCETS (ONETOUCH DELICA LANCETS) 33 GAUGE MISC    1 lancet by Misc.(Non-Drug; Combo Route) route Daily. Use to    METFORMIN (GLUCOPHAGE-XR) 500 MG 24 HR TABLET    Take 1 tablet (500 mg total) by mouth 2 (two) times daily with meals.    METOPROLOL TARTRATE (LOPRESSOR) 25 MG TABLET    Take 1 tablet (25 mg total) by mouth 2 (two) times daily.    MOMETASONE (ASMANEX TWISTHALER) 220 MCG (60 DOSES) AEPB    Inhale 2 puffs into the lungs once daily. Controller    NITROGLYCERIN (NITROSTAT) 0.4 MG SL TABLET    Place 1 tablet (0.4 mg total) under the tongue every 5 (five) minutes as needed for Chest pain.    [facility]    USE AS DIRECTED FOR INHALATION. PLEASE RUN FOR INSURANCE PREFERRED SPACER AND PT PREFERRED SIZE.    PANTOPRAZOLE (PROTONIX) 40 MG TABLET    Take 1 tablet (40 mg total) by mouth once daily.    SUCRALFATE (CARAFATE) 1 GRAM TABLET    Take 1 tablet (1 g total) by mouth 4 (four) times daily. dissolve in 10 mL of water and swallow    TRAMADOL (ULTRAM) 50 MG TABLET    Take 2 tablets (100 mg total) by mouth every 8 (eight) hours as needed.   Changed and/or Refilled Medications    Modified Medication Previous Medication    LISINOPRIL (PRINIVIL,ZESTRIL) 5 MG TABLET lisinopril (PRINIVIL,ZESTRIL) 5 MG tablet       Take 0.5 tablets (2.5 mg total) by mouth once daily.    Take 1 tablet (5 mg total) by  mouth once daily.   Discontinued Medications    ESCITALOPRAM OXALATE (LEXAPRO) 10 MG TABLET    Take 1 tablet (10 mg total) by mouth once daily.         Follow-up if symptoms worsen or fail to improve.

## 2019-02-18 NOTE — PATIENT INSTRUCTIONS
- Perform stretching exercises, see handout for details, to address tightness of calf muscles.      - Recommend wearing supportive shoes only.  Avoid barefoot walking, flip flops, and Crocs, as this will exacerbate current symptoms.      - Recommend icing the affected heel a minimum of 20 minutes daily.    - Avoid high impact activities such as squatting, stooping, and running as these activities will exacerbate symptoms.      - May consider applying a topical analgesic (Aspercream, Biofreeze, or Salonpas) to help with pain symptoms.
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
## 2019-02-21 NOTE — PROGRESS NOTES
"Subjective:       Patient ID: Tiana Bosch is a 68 y.o. female.    Vitals:  height is 5' 2" (1.575 m) and weight is 71.2 kg (157 lb). Her temperature is 97.6 °F (36.4 °C). Her blood pressure is 118/56 (abnormal) and her pulse is 81. Her respiration is 18 and oxygen saturation is 90 (abnormal)%.     Chief Complaint: Urinary Tract Infection    Started with burning, dysuria, frequency and urgency last night. Took AZO.       Urinary Tract Infection    This is a new problem. The current episode started yesterday. The problem occurs every urination. The problem has been unchanged. The quality of the pain is described as burning. There has been no fever. Associated symptoms include frequency and urgency. Pertinent negatives include no chills, hematuria, nausea, vomiting or rash. She has tried home medications for the symptoms. The treatment provided mild relief.       Constitution: Negative for chills and fever.   Neck: Negative for painful lymph nodes.   Gastrointestinal: Negative for abdominal pain, nausea and vomiting.   Genitourinary: Positive for dysuria, frequency, urgency and pelvic pain. Negative for urine decreased, hematuria, history of kidney stones, painful menstruation, irregular menstruation, missed menses, heavy menstrual bleeding, ovarian cysts, genital trauma, vaginal pain, vaginal discharge, vaginal bleeding, vaginal odor, painful intercourse, genital sore and painful ejaculation.   Musculoskeletal: Negative for back pain.   Skin: Negative for rash and lesion.   Hematologic/Lymphatic: Negative for swollen lymph nodes.       Objective:      Physical Exam   Constitutional: She is oriented to person, place, and time. She appears well-developed and well-nourished. She is cooperative.  Non-toxic appearance. She does not appear ill. No distress.   HENT:   Head: Normocephalic and atraumatic.   Right Ear: Hearing, tympanic membrane, external ear and ear canal normal.   Left Ear: Hearing, tympanic membrane, " external ear and ear canal normal.   Nose: Nose normal. No mucosal edema, rhinorrhea or nasal deformity. No epistaxis. Right sinus exhibits no maxillary sinus tenderness and no frontal sinus tenderness. Left sinus exhibits no maxillary sinus tenderness and no frontal sinus tenderness.   Mouth/Throat: Uvula is midline, oropharynx is clear and moist and mucous membranes are normal. No trismus in the jaw. Normal dentition. No uvula swelling. No posterior oropharyngeal erythema.   Eyes: Conjunctivae and lids are normal. Right eye exhibits no discharge. Left eye exhibits no discharge. No scleral icterus.   Sclera clear bilat   Neck: Trachea normal, normal range of motion, full passive range of motion without pain and phonation normal. Neck supple.   Cardiovascular: Normal rate, regular rhythm, normal heart sounds, intact distal pulses and normal pulses.   Pulmonary/Chest: Effort normal and breath sounds normal. No respiratory distress.   Abdominal: Soft. Normal appearance and bowel sounds are normal. She exhibits no distension, no pulsatile midline mass and no mass. There is no tenderness.   Mild suprapubic tenderness to palpation.  No other abdominal pain. No CVA tenderness to percussion.   Musculoskeletal: Normal range of motion. She exhibits no edema or deformity.   Neurological: She is alert and oriented to person, place, and time. She exhibits normal muscle tone. Coordination normal.   Skin: Skin is warm, dry and intact. She is not diaphoretic. No pallor.   Psychiatric: She has a normal mood and affect. Her speech is normal and behavior is normal. Judgment and thought content normal. Cognition and memory are normal.   Nursing note and vitals reviewed.      Assessment:       No diagnosis found.    Plan:         There are no diagnoses linked to this encounter.

## 2019-02-24 NOTE — TELEPHONE ENCOUNTER
Will switch to Bacrim. Frustrated with yesterday's events. We will call with cx results once they arrive. Patient v/u.      ----- Message from Nirmal Harris MA sent at 2/24/2019 11:38 AM CST -----  Patient stated Macrobid caused N/V. Requesting a new antibiotic.

## 2019-02-24 NOTE — TELEPHONE ENCOUNTER
Follow up call r/t recent visit. Patient stated the Macrobid she was prescribed caused N/V. Patient stated she had called yesterday and was advised that something else would be called in but never received anything. Advised per Masoud JOHNSTON that a prescription for Bactrim will be called in. Patient verbalized understanding.

## 2019-02-25 NOTE — TELEPHONE ENCOUNTER
Call back-Informed patient of results.      ----- Message from Amos Walker MD sent at 2/25/2019  8:40 AM CST -----  Urine culture is positive for bacteria that should be killed by  the Macrobid you were started on, but probably NOT by the Bactrim you were changed to yesterday.  We will therefore send to your pharmacy a script for doxycycline x 7 days which shoukd kill the bacteria.    Stop the Bactrim.   You should have your urine re-tested by your PCP after you finish the antibiotics to ensure the infection has resolved.
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
## 2019-03-29 NOTE — TELEPHONE ENCOUNTER
Patient has had quite a bit of lab work from other providers in last 6 months, just checking to see if she needs any labs prior to the April 16th appointment.     Please advise.

## 2019-04-10 PROBLEM — R60.0 LEG EDEMA, LEFT: Status: ACTIVE | Noted: 2019-01-01

## 2019-04-10 NOTE — PROGRESS NOTES
Subjective:    Patient ID:  Tiana Bosch is a 68 y.o. female who presents for follow-up of Hypertension (follow up )      Noticed edema in left leg also pain in left calf.      Review of Systems   Cardiovascular: Positive for leg swelling.   All other systems reviewed and are negative.       Objective:      Physical Exam   Constitutional: She is oriented to person, place, and time. She appears well-developed and well-nourished.   HENT:   Head: Normocephalic and atraumatic.   Eyes: Pupils are equal, round, and reactive to light. Conjunctivae and EOM are normal. Right eye exhibits no discharge. Left eye exhibits no discharge. No scleral icterus.   Neck: Normal range of motion. Neck supple. No JVD present. No tracheal deviation present. No thyromegaly present.   Cardiovascular: Normal rate and regular rhythm.   Murmur heard.   Early systolic murmur is present at the upper right sternal border.  Pulses:       Carotid pulses are 2+ on the right side, and 2+ on the left side.       Dorsalis pedis pulses are 2+ on the right side, and 2+ on the left side.        Posterior tibial pulses are 2+ on the right side, and 2+ on the left side.   Pulmonary/Chest: Effort normal and breath sounds normal. No stridor. No respiratory distress. She has no wheezes. She has no rales.   Abdominal: Bowel sounds are normal. She exhibits no distension. There is no tenderness. There is no rebound and no guarding.   Musculoskeletal: Normal range of motion. She exhibits edema (1 plusleft leg). She exhibits no tenderness or deformity.   Lymphadenopathy:     She has no cervical adenopathy.   Neurological: She is alert and oriented to person, place, and time.   Skin: Skin is warm and dry. No rash noted. No erythema. No pallor.   Psychiatric: She has a normal mood and affect. Her behavior is normal. Judgment and thought content normal.         Assessment:       1. PAH (pulmonary artery hypertension)    2. Leg edema, left    3. COPD exacerbation     4. Hyperlipidemia LDL goal <70    5. Coronary artery disease involving native coronary artery of native heart without angina pectoris         Plan:       PAH (pulmonary artery hypertension)    Leg edema, left    COPD exacerbation    Hyperlipidemia LDL goal <70    Coronary artery disease involving native coronary artery of native heart without angina pectoris    Will r/o dvt left leg.RTC 4 months.

## 2019-04-11 NOTE — TELEPHONE ENCOUNTER
----- Message from Juan Luis Gonzalez MD sent at 4/10/2019  4:32 PM CDT -----  No clots in the legs. A

## 2019-04-16 PROBLEM — J44.1 COPD EXACERBATION: Status: RESOLVED | Noted: 2018-01-01 | Resolved: 2019-01-01

## 2019-04-16 PROBLEM — J44.9 CHRONIC OBSTRUCTIVE PULMONARY DISEASE: Status: ACTIVE | Noted: 2019-01-01

## 2019-04-16 PROBLEM — F33.42 RECURRENT MAJOR DEPRESSIVE DISORDER, IN FULL REMISSION: Status: ACTIVE | Noted: 2019-01-01

## 2019-04-16 PROBLEM — J96.11 CHRONIC RESPIRATORY FAILURE WITH HYPOXIA: Status: ACTIVE | Noted: 2019-01-01

## 2019-04-16 NOTE — PROGRESS NOTES
Subjective:       Patient ID: Tiana Bosch is a 68 y.o. female.    Chief Complaint: Leg Pain and Foot Pain    Here for f/u htn and dm II. Doing well overall.    Hypertension   This is a chronic problem. The current episode started more than 1 year ago. The problem is controlled. Pertinent negatives include no chest pain, palpitations or shortness of breath.   Diabetes   She presents for her follow-up diabetic visit. She has type 2 diabetes mellitus. Her disease course has been stable. Pertinent negatives for hypoglycemia include no nervousness/anxiousness. Pertinent negatives for diabetes include no chest pain and no fatigue.     Review of Systems   Constitutional: Negative for chills, fatigue and fever.   Respiratory: Negative for cough, chest tightness and shortness of breath.    Cardiovascular: Negative for chest pain, palpitations and leg swelling.   Endocrine: Negative for cold intolerance and heat intolerance.   Musculoskeletal: Positive for arthralgias.   Skin: Negative for rash.   Psychiatric/Behavioral: Negative for dysphoric mood. The patient is not nervous/anxious.        Objective:      Physical Exam   Constitutional: She appears well-developed and well-nourished.   HENT:   Head: Normocephalic and atraumatic.   Cardiovascular: Normal rate, regular rhythm and normal heart sounds.   Pulmonary/Chest: Effort normal and breath sounds normal.   Psychiatric: She has a normal mood and affect.   Nursing note and vitals reviewed.      Assessment:       1. Type 2 diabetes mellitus with diabetic polyneuropathy, without long-term current use of insulin    2. Hypertension, unspecified type    3. Hyperlipidemia LDL goal <70    4. On home oxygen therapy    5. Multiple joint pain    6. Chronic respiratory failure with hypoxia    7. Recurrent major depressive disorder, in full remission    8. Chronic obstructive pulmonary disease, unspecified COPD type        Plan:       Type 2 diabetes mellitus with diabetic  polyneuropathy, without long-term current use of insulin  -     Comprehensive metabolic panel; Future; Expected date: 04/16/2019  -     Hemoglobin A1c; Future; Expected date: 04/16/2019  -     CBC auto differential; Future; Expected date: 04/16/2019  -     TSH; Future; Expected date: 04/16/2019    Hypertension, unspecified type    Hyperlipidemia LDL goal <70    On home oxygen therapy    Multiple joint pain  -     RON Screen w/Reflex; Future; Expected date: 04/16/2019    Chronic respiratory failure with hypoxia    Recurrent major depressive disorder, in full remission    Chronic obstructive pulmonary disease, unspecified COPD type    Other orders  -     hydrOXYzine HCl (ATARAX) 25 MG tablet; Take 1 tablet (25 mg total) by mouth nightly as needed (sleep).  Dispense: 30 tablet; Refill: 5        Mood stable  htn stable  Update hga1c for dm II  Update labs and health maintenance.  Check ron.  Will monitor chronic medical issues and continue current plan of care.    Follow up in about 6 months (around 10/16/2019), or if symptoms worsen or fail to improve.

## 2019-05-19 PROBLEM — R07.9 CHEST PAIN: Status: ACTIVE | Noted: 2019-01-01

## 2019-05-21 PROBLEM — R07.89 ATYPICAL CHEST PAIN: Status: ACTIVE | Noted: 2019-01-01

## 2019-06-06 NOTE — TELEPHONE ENCOUNTER
----- Message from Nieves Saavedra sent at 6/6/2019 10:58 AM CDT -----  Contact: Alexsandra/[facility]  Type: Needs Medical Advice    Who Called:  Alexsandra  Symptoms (please be specific):  na  How long has patient had these symptoms:  na  Pharm[facility] name and phone #:  [email or web address]  Best Call Back Number: [phone] (home)     Additional Information: Patient needs a hospital f/up apt. 1-2 wks  Please call to advise and schedule.  Thanks
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
## 2019-06-20 PROBLEM — M25.60 LIMITED JOINT RANGE OF MOTION (ROM): Status: ACTIVE | Noted: 2019-01-01

## 2019-06-20 PROBLEM — M62.89 TIGHTNESS OF BOTH GASTROCNEMIUS MUSCLES: Status: ACTIVE | Noted: 2019-01-01

## 2019-06-20 PROBLEM — R29.898 WEAKNESS OF BOTH LEGS: Status: ACTIVE | Noted: 2019-01-01
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
## 2019-06-20 NOTE — PATIENT INSTRUCTIONS
Stretching: Calf - Towel        Sit with knee straight and towel looped around left foot. Gently pull on towel until stretch is felt in calf. Hold ____ seconds.  Repeat ____ times per set. Do ____ sets per session. Do ____ sessions per day.     https://[employer]/706     Copyright © [employer]. All rights reserved.   Heel Raise (Sitting)        Raise heels, keeping toes on floor.  Repeat ____ times per set. Do ____ sets per session. Do ____ sessions per day.     https://[employer]/44     Copyright © [employer]. All rights reserved.   PROM: Toe Flexion / Extension        Gently grasp right toes and curl then straighten them. Hold each position ____ seconds.  Repeat ____ times per set. Do ____ sets per session. Do ____ sessions per day.  Have someone else move foot.     [email or web address]     Copyright © [employer]. All rights reserved.   Dorsiflexion: Self-Mobilization (Sitting)        Feet flat, other foot forward, slide left foot back until gentle stretch is felt. Keep entire foot on floor. Hold ____ seconds. Relax.  Repeat ____ times per set. Do ____ sets per session. Do ____ sessions per day.     [email or web address]     Copyright © [employer]. All rights reserved.

## 2019-06-24 PROBLEM — Z78.9 STATIN INTOLERANCE: Status: ACTIVE | Noted: 2019-01-01

## 2019-06-24 NOTE — PROGRESS NOTES
Subjective:    Patient ID:  Tiana Bosch is a 68 y.o. female who presents for follow-up of Chest Pain (Post hosp f/u - [facility] 05/19/19) and Shortness of Breath      Problem List Items Addressed This Visit        Cardiac/Vascular    Coronary artery disease involving native coronary artery of native heart without angina pectoris - Primary    Relevant Medications    nitroGLYCERIN (NITROSTAT) 0.4 MG SL tablet    Other Relevant Orders    Transthoracic echo (TTE) 2D with Color Flow    Essential hypertension    Mixed hyperlipidemia       Other    Statin intolerance    Overview     Severe myalgias           Other Visit Diagnoses     NSTEMI (non-ST elevated myocardial infarction)        Relevant Medications    nitroGLYCERIN (NITROSTAT) 0.4 MG SL tablet    Pulmonary hypertension        Relevant Orders    Transthoracic echo (TTE) 2D with Color Flow          HPI    Patient of Dr. Gonzalez    Patient was recently admitted from 06/04/2019 through 06/06/2019 at [facility] for a COPD exacerbation from which she recovered well.    On assessment today, the patient states that she is feeling much better. Breathing is back to baseline.    Statin intolerance - myalgias    Review of Systems   Constitution: Negative for decreased appetite, fever and malaise/fatigue.   Eyes: Negative for blurred vision.   Cardiovascular: Negative for chest pain, dyspnea on exertion, irregular heartbeat and leg swelling.   Respiratory: Negative for cough, hemoptysis, shortness of breath and wheezing.    Endocrine: Negative for cold intolerance and heat intolerance.   Hematologic/Lymphatic: Negative for bleeding problem.   Musculoskeletal: Negative for muscle weakness and myalgias.   Gastrointestinal: Negative for abdominal pain, constipation and diarrhea.   Genitourinary: Negative for bladder incontinence.   Neurological: Negative for dizziness and weakness.   Psychiatric/Behavioral: Negative for depression.        Objective:  "    Vitals:    06/24/19 1439   BP: 136/70   BP Location: Left arm   Patient Position: Sitting   BP Method: Medium (Manual)   Pulse: 60   Weight: 68.5 kg (151 lb)   Height: 5' 2" (1.575 m)        Physical Exam   Constitutional: She is oriented to person, place, and time. She appears well-developed and well-nourished. No distress.   HENT:   Head: Normocephalic and atraumatic.   Neck: Neck supple. No JVD present.   Cardiovascular: Normal rate, regular rhythm and normal heart sounds. Exam reveals no gallop and no friction rub.   No murmur heard.  Pulmonary/Chest: Effort normal and breath sounds normal. No respiratory distress. She has no wheezes. She has no rales.   Abdominal: Soft. Bowel sounds are normal. There is no tenderness. There is no rebound and no guarding.   Musculoskeletal: She exhibits no edema or tenderness.   Neurological: She is alert and oriented to person, place, and time.   Skin: Skin is warm and dry.   Psychiatric: Her behavior is normal.           Current Outpatient Medications on File Prior to Visit   Medication Sig    ALBUTEROL INHL Inhale 2 puffs into the lungs as needed.     aspirin (ECOTRIN) 81 MG EC tablet Take 1 tablet (81 mg total) by mouth once daily. (Patient taking differently: Take 81 mg by mouth every evening. )    blood-glucose meter kit Use as instructed    calcium carbonate (OS-RENALDO) 500 mg calcium (1,250 mg) tablet Take 1 tablet by mouth once daily.    fexofenadine HCl (CHILDREN'S ALLEGRA ALLERGY ORAL) Take 1 tablet by mouth every evening.    fluocinolone acetonide oil 0.01 % Drop INSTILL THREE DROPS INTO AFFECTED EAR TWICE DAILY (Patient taking differently: Apply 3 drops topically daily as needed. INSTILL THREE DROPS INTO AFFECTED EAR TWICE DAILY )    fluticasone (FLONASE) 50 mcg/actuation nasal spray 1 spray (50 mcg total) by Each Nare route once daily. (Patient taking differently: 1 spray by Each Nare route daily as needed. )    gabapentin (NEURONTIN) 400 MG capsule Take " two capsules in the morning and afternoon and three capsules at night. (Patient taking differently: Take 800 mg by mouth 3 (three) times daily. Take two capsules in the morning and afternoon and three capsules at night.)    glipiZIDE (GLUCOTROL) 5 MG tablet Take 1 tablet (5 mg total) by mouth 2 (two) times daily before meals.    glycopyrrolate-formoterol (BEVESPI AEROSPHERE) 9-4.8 mcg HFAA Inhale 1 puff into the lungs 2 (two) times daily. Controller     lisinopril (PRINIVIL,ZESTRIL) 5 MG tablet Take 0.5 tablets (2.5 mg total) by mouth once daily.    metFORMIN (GLUCOPHAGE-XR) 500 MG 24 hr tablet Take 1 tablet (500 mg total) by mouth 2 (two) times daily with meals.    metoprolol tartrate (LOPRESSOR) 25 MG tablet Take 1 tablet (25 mg total) by mouth 2 (two) times daily.    mometasone (ASMANEX TWISTHALER) 220 mcg (60 doses) AePB Inhale 2 puffs into the lungs once daily. Controller    ONETOUCH DELICA LANCETS 33 gauge Misc USE TO TEST BLOOD SUGAR DAILY    ONETOUCH ULTRA BLUE TEST STRIP Strp USE TO TEST BLOOD SUGAR ONE TIME DAILY    [facility] USE AS DIRECTED FOR INHALATION. PLEASE RUN FOR INSURANCE PREFERRED SPACER AND PT PREFERRED SIZE.    pantoprazole (PROTONIX) 40 MG tablet Take 1 tablet (40 mg total) by mouth once daily.    roflumilast (DALIRESP) 250 mcg Tab Take by mouth.    sucralfate (CARAFATE) 1 gram tablet Take 1 tablet (1 g total) by mouth 4 (four) times daily. dissolve in 10 mL of water and swallow    tiZANidine (ZANAFLEX) 4 MG tablet Take 1 tablet (4 mg total) by mouth every 8 (eight) hours as needed.    tramadol (ULTRAM) 50 mg tablet Take 2 tablets (100 mg total) by mouth every 8 (eight) hours as needed.    [DISCONTINUED] nitroGLYCERIN (NITROSTAT) 0.4 MG SL tablet Place 1 tablet (0.4 mg total) under the tongue every 5 (five) minutes as needed for Chest pain.    [DISCONTINUED] calcium & magnesium carbonates (MYLANTA) 311-232 mg per tablet Take 1 tablet by mouth once daily.     [DISCONTINUED] nystatin (MYCOSTATIN) 100,000 unit/mL suspension TAKE 5 MLS BY MOUTH 3 TIMES DAILY    [DISCONTINUED] predniSONE (DELTASONE) 10 MG tablet Prednisone 10mg PO 4 tabs daily x 3 days, then Prednisone 10mg PO 2 tabs daily x 3 days, then Prednisone 10mg PO 1 tab daily x 3 days     No current facility-administered medications on file prior to visit.        Lipid Panel:   Lab Results   Component Value Date    CHOL 160 05/20/2019    HDL 50 05/20/2019    LDLCALC 76.6 05/20/2019    TRIG 167 (H) 05/20/2019    CHOLHDL 31.3 05/20/2019       The ASCVD Risk score (Yehudamyranda CASTELLANOS Jr., et al., 2013) failed to calculate for the following reasons:    The patient has a prior MI or stroke diagnosis    All pertinent labs, imaging, and EKGs reviewed.    Assessment:       1. Coronary artery disease involving native coronary artery of native heart without angina pectoris    2. Essential hypertension    3. Mixed hyperlipidemia    4. Statin intolerance    5. NSTEMI (non-ST elevated myocardial infarction)    6. Pulmonary hypertension         Plan:     Symptoms much better  No chest pain    Echocardiogram to reassess pulmonary pressures  Dobutamine nuclear stress in June 2020 to reassess    Continue other cardiac medications  Mediterranean Diet/Cardiovascular Exercise Program    Patient queried and all questions were answered.    F/u in 6 months to reassess      Signed:    Ghassan Mckinney MD  6/24/2019 9:33 AM

## 2019-06-24 NOTE — LETTER
June 24, 2019      [facility]  [address] [facility] [address]           [place] [facility]  [address] [facility] [address] [place] [address]  Phone: [phone]          Patient: Tiana Bosch   MR Number: [id number]   YOB: 1950   Date of Visit: 6/24/2019       Dear [facility]:    Thank you for referring Tiana Bosch to me for evaluation. Attached you will find relevant portions of my assessment and plan of care.    If you have questions, please do not hesitate to call me. I look forward to following Tiana Bosch along with you.    Sincerely,    Ghassan Mckinney MD    Enclosure  CC:  No Recipients    If you would like to receive this communication electronically, please contact [email or web address] or [phone] to request more information on [facility] access.    For providers and/or their staff who would like to refer a patient to [facility], please contact us through our one-stop-shop provider referral line, [facility] , at [phone].    If you feel you have received this communication in error or would no longer like to receive these types of communications, please e-mail [email or web address]

## 2019-07-10 PROBLEM — I77.6 VASCULITIS: Status: RESOLVED | Noted: 2019-01-01 | Resolved: 2019-01-01

## 2019-07-10 PROBLEM — I77.6 VASCULITIS: Status: ACTIVE | Noted: 2019-01-01

## 2019-07-10 NOTE — PROGRESS NOTES
"Subjective:       Patient ID: Tiana Bosch is a 68 y.o. female.    Chief Complaint: Edema (left ankle ) and Foot Pain (left ankle)  She was last seen in primary care by me on 02/13/2019.  She last saw Faisal on 04/16/2019.  She is accompanied by her .   HPI   She is here today with complaint of left foot and ankle pain for past few months. States it comes and goes and awakens at night time. Denies fall or injury. Pain 9 on ankle, describes pain as stabbing and "it just ines goes away". States "a few times I had it going up the side of my leg".    Vitals:    07/10/19 1344   BP: 130/68   Pulse: 86   Temp: 98.2 °F (36.8 °C)     Review of Systems   Musculoskeletal:        Left ankle and foot pain       Objective:      Physical Exam   Constitutional: She is oriented to person, place, and time. Vital signs are normal. She appears well-developed and well-nourished. She is active and cooperative.   HENT:   Head: Normocephalic and atraumatic.   Right Ear: Hearing, tympanic membrane, external ear and ear canal normal.   Left Ear: Hearing, tympanic membrane, external ear and ear canal normal.   Nose: Nose normal.   Mouth/Throat: Uvula is midline, oropharynx is clear and moist and mucous membranes are normal.   Eyes: Lids are normal.   Neck: Trachea normal, normal range of motion, full passive range of motion without pain and phonation normal. Neck supple.   Cardiovascular: Normal rate and regular rhythm.   Pulmonary/Chest: Effort normal. She has decreased breath sounds.   Abdominal: Soft. Bowel sounds are normal. There is no tenderness.   Musculoskeletal: She exhibits edema.        Feet:    Tender to touch at left outer ankle   Lymphadenopathy:        Head (right side): No submental, no submandibular, no tonsillar, no preauricular, no posterior auricular and no occipital adenopathy present.        Head (left side): No submental, no tonsillar, no preauricular, no posterior auricular and no occipital adenopathy " present.     She has no cervical adenopathy.   Neurological: She is alert and oriented to person, place, and time.   Skin: Skin is warm, dry and intact.   Psychiatric: Her speech is normal and behavior is normal. Judgment and thought content normal. Her mood appears anxious. Cognition and memory are normal.   States anxious about her inability to do all of the things she use to do   Nursing note and vitals reviewed.      Assessment & Plan:       Pain, joint, ankle and foot, left  -     X-Ray Ankle Complete Left; Future; Expected date: 07/10/2019  -     Uric acid; Future; Expected date: 07/10/2019    Anxiety  -     Ambulatory referral to Psychiatry    Current mild episode of major depressive disorder, unspecified whether recurrent  -     Ambulatory referral to Psychiatry    Centrilobular emphysema- Stable managed by pulmonology  Comments:  08/18 CTA chest    On home oxygen therapy- Using daily          Medication List with Changes/Refills   Current Medications    ALBUTEROL INHL    Inhale 2 puffs into the lungs as needed.     ASMANEX TWISTHALER 220 MCG (120 DOSES) AEPB    INHALE 1 PUFF (220 MCG) BY MOUTH 2 TIMES PER DAY FOR 30 DAYS    ASPIRIN (ECOTRIN) 81 MG EC TABLET    Take 1 tablet (81 mg total) by mouth once daily.    BLOOD-GLUCOSE METER KIT    Use as instructed    CALCIUM CARBONATE (OS-RENALDO) 500 MG CALCIUM (1,250 MG) TABLET    Take 1 tablet by mouth once daily.    FEXOFENADINE HCL (CHILDREN'S ALLEGRA ALLERGY ORAL)    Take 1 tablet by mouth every evening.    FLUOCINOLONE ACETONIDE OIL 0.01 % DROP    INSTILL THREE DROPS INTO AFFECTED EAR TWICE DAILY    FLUTICASONE (FLONASE) 50 MCG/ACTUATION NASAL SPRAY    1 spray (50 mcg total) by Each Nare route once daily.    GABAPENTIN (NEURONTIN) 400 MG CAPSULE    Take two capsules in the morning and afternoon and three capsules at night.    GLIPIZIDE (GLUCOTROL) 5 MG TABLET    Take 1 tablet (5 mg total) by mouth 2 (two) times daily before meals.    GLYCOPYRROLATE-FORMOTEROL  (BEVESPI AEROSPHERE) 9-4.8 MCG HFAA    Inhale 1 puff into the lungs 2 (two) times daily. Controller     LISINOPRIL (PRINIVIL,ZESTRIL) 5 MG TABLET    Take 0.5 tablets (2.5 mg total) by mouth once daily.    METFORMIN (GLUCOPHAGE-XR) 500 MG 24 HR TABLET    Take 1 tablet (500 mg total) by mouth 2 (two) times daily with meals.    METOPROLOL TARTRATE (LOPRESSOR) 25 MG TABLET    Take 1 tablet (25 mg total) by mouth 2 (two) times daily.    MOMETASONE (ASMANEX TWISTHALER) 220 MCG (60 DOSES) AEPB    Inhale 2 puffs into the lungs once daily. Controller    NITROGLYCERIN (NITROSTAT) 0.4 MG SL TABLET    Place 1 tablet (0.4 mg total) under the tongue every 5 (five) minutes as needed for Chest pain.    ONETOUCH DELICA LANCETS 33 GAUGE MISC    USE TO TEST BLOOD SUGAR DAILY    ONETOUCH ULTRA BLUE TEST STRIP STRP    USE TO TEST BLOOD SUGAR ONE TIME DAILY    [facility]    USE AS DIRECTED FOR INHALATION. PLEASE RUN FOR INSURANCE PREFERRED SPACER AND PT PREFERRED SIZE.    PANTOPRAZOLE (PROTONIX) 40 MG TABLET    Take 1 tablet (40 mg total) by mouth once daily.    ROFLUMILAST (DALIRESP) 250 MCG TAB    Take by mouth.    SODIUM CHLORIDE 7% 7 % NEBULIZER SOLUTION    USE IN NEBULIZER AS DIRECTED 3 TIMES A DAY    SUCRALFATE (CARAFATE) 1 GRAM TABLET    Take 1 tablet (1 g total) by mouth 4 (four) times daily. dissolve in 10 mL of water and swallow    TIZANIDINE (ZANAFLEX) 4 MG TABLET    Take 1 tablet (4 mg total) by mouth every 8 (eight) hours as needed.    TRAMADOL (ULTRAM) 50 MG TABLET    Take 2 tablets (100 mg total) by mouth every 8 (eight) hours as needed.     Follow up if symptoms worsen or fail to improve.
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
## 2019-07-24 NOTE — TELEPHONE ENCOUNTER
Patient requesting motorized wheelchair. Can we get her referred to appropriate company to start process to see if she qualifies.

## 2019-07-24 NOTE — TELEPHONE ENCOUNTER
Called [facility] and spoke with eros, she will look into it and contact the patient to see what she is needing.

## 2019-08-07 NOTE — TELEPHONE ENCOUNTER
Attempted to contact [employer], No answer and no voicemail. Faxing requested paperwork to [phone].

## 2019-08-07 NOTE — TELEPHONE ENCOUNTER
----- Message from Debbi Wood sent at 8/7/2019 10:34 AM CDT -----   Type:  Patient Returning Call    Who Called:  Caitie                                                                                                                      Who Left Message for Patient:  [facility]  Does the patient know what this is regarding?:  Wheelchair                                                   Best Call Back Number:  [phone]   Additional Information:  Asking for Anabelle

## 2019-08-15 NOTE — PROGRESS NOTES
Refill Authorization Note     is requesting a refill authorization.    Brief assessment and rationale for refill: APPROVE: prr  Name and strength of medication: glipiZIDE (GLUCOTROL) 5 MG tablet       Medication Therapy Plan: DM- stable, lco(LOV); A1C =7; Approve 3 more months     Medication reconciliation completed: No              How patient will take medication: t1t po bid          Comments:   Last A1C: (6 months)   Lab Results   Component Value Date    HGBA1C 7.0 (H) 04/30/2019    HGBA1C 7.3 (H) 10/12/2018    HGBA1C 7.2 (H) 06/10/2018    No results found for: LABA1C     Last Creatinine: 12 months    Lab Results   Component Value Date    CREATININE 0.88 06/06/2019    CREATININE 0.90 06/04/2019    CREATININE 0.86 05/19/2019         [employer] Diabetes Data: (values will display if enrolled)   Last 6 Patient Entered Readings                                          Most Recent A1c:      There is no flowsheet data to display.        - History of MI               APPOINTMENTS(past 12m or future 3m authorizing provider)    Date Provider   LAST VISIT  4/16/2019 MARY JO Malone MD   NEXT VISIT  10/21/2019 MARY JO Malone MD

## 2019-08-30 NOTE — PATIENT INSTRUCTIONS
"To emergency room if worsening shortness of breath  Elevate feet above level of heart  Weigh daily and if 3 pound gain in 1 day go to ED  Eat bananna daily    [place] Diet  Your healthcare provider may recommend a bland diet if you have an upset stomach. It consists of foods that are mild and easy to digest. It is better to eat small frequent meals rather than 3 large meals a day.    Beverages  OK: Fruit juices, non-caffeinated teas and coffee, non-carbonated potter  Avoid: Carbonated beverage, caffeinated tea and coffee, all alcoholic beverages  Bread  OK: Refined white, wheat or rye bread, kasey or soda crackers, [place] toast, plain rolls, bagels  Avoid: Whole-grain bread  Cereal  OK: Refined cereals: cooked or ready to eat  Avoid: Whole-grain cereals and granola, or those containing bran, seeds or nuts  Desserts  OK: Peanut butter and all others except those to "avoid"  Avoid: Chocolate, cocoa, coconut, popcorn, nuts, seeds, jam, marmalade  Fruits  OK: Canned, cooked, frozen or fresh fruits without seeds or tough skin  Avoid: Olives, skin and seeds of fruit  Meats  OK: All fresh or preserved meat, fish and fowl  Avoid: Any that are prepared with those spices to "avoid"  Cheese and eggs  OK: Eggs, cottage cheese, cream cheese, other cheeses  Avoid: All cheeses made with those spices to "avoid"  Potatoes and pasta  OK: Potato, rice, macaroni, noodles, spaghetti  Avoid: None  Soups  OK: All soups without heavy seasoning  Avoid: Soups made with those spices to "avoid"  Vegetables  OK: Canned, cooked, fresh or frozen mildly flavored vegetables without seeds, skins or coarse fiber  Avoid: Vegetables prepared with those spices to "avoid"; skin and seeds of vegetables and those with coarse fiber  Spices  OK: Salt, lemon and lime juice, vinegar, all extracts, eryn, cinnamon, thyme, mace, allspice, paprika  Avoid: Chili powder, cloves, pepper, seed spices, garlic, gravy pickles, highly seasoned salad dressings  Date Last " Reviewed: 11/20/2015  © 7142-0188 [employer]. [address] [place] [address], [place] [address]. All rights reserved. This information is not intended as a substitute for professional medical care. Always follow your healthcare professional's instructions.

## 2019-08-30 NOTE — PROGRESS NOTES
Subjective:       Patient ID: Tiana Bosch is a 69 y.o. female.    Chief Complaint: Edema and Nausea  Patient was last seen by me on 07/10/2019  Last seen by PCP on 04/16/2019  She is accompanied by her .  HPI   She is here with complaints of edema to lower extremities and nausea. She has had the nausea for past 2 weeks and symptoms worsened on yesterday.   Vitals:    08/30/19 0750   BP: 132/84   Pulse: 100   Resp: 20     BP Readings from Last 3 Encounters:   08/30/19 132/84   07/10/19 130/68   06/24/19 136/70     Review of Systems   Constitutional: Positive for appetite change.   Respiratory: Positive for shortness of breath.    Cardiovascular: Positive for leg swelling.   Gastrointestinal: Positive for nausea and vomiting.       SPO2 consistently at 83-84 even with oxygen in use during vomiting. It did go up to 91% when not vomiting.  She hs bright yellowish vomitus during this visit and states has green tea this morning  Lab Results   Component Value Date    HGBA1C 7.0 (H) 04/30/2019    HGBA1C 7.3 (H) 10/12/2018    HGBA1C 7.2 (H) 06/10/2018     Lab Results   Component Value Date    LDLCALC 76.6 05/20/2019    CREATININE 0.88 06/06/2019     CMP  Sodium   Date Value Ref Range Status   06/06/2019 137 136 - 145 mmol/L Final     Potassium   Date Value Ref Range Status   06/06/2019 4.6 3.5 - 5.1 mmol/L Final     Chloride   Date Value Ref Range Status   06/06/2019 98 95 - 110 mmol/L Final     CO2   Date Value Ref Range Status   06/06/2019 26 22 - 31 mmol/L Final     Glucose   Date Value Ref Range Status   06/06/2019 289 (H) 70 - 110 mg/dL Final     Comment:     The ADA recommends the following guidelines for fasting glucose:  Normal:       less than 100 mg/dL  Prediabetes:  100 mg/dL to 125 mg/dL  Diabetes:     126 mg/dL or higher       BUN, Bld   Date Value Ref Range Status   06/06/2019 25 (H) 7 - 18 mg/dL Final     Creatinine   Date Value Ref Range Status   06/06/2019 0.88 0.50 - 1.40 mg/dL Final     Calcium    Date Value Ref Range Status   06/06/2019 9.9 8.4 - 10.2 mg/dL Final     Total Protein   Date Value Ref Range Status   06/04/2019 7.6 6.0 - 8.4 g/dL Final     Albumin   Date Value Ref Range Status   06/04/2019 4.6 3.5 - 5.2 g/dL Final     Total Bilirubin   Date Value Ref Range Status   06/04/2019 0.5 0.2 - 1.3 mg/dL Final     Alkaline Phosphatase   Date Value Ref Range Status   06/04/2019 131 38 - 145 U/L Final     AST   Date Value Ref Range Status   06/04/2019 29 14 - 36 U/L Final     ALT   Date Value Ref Range Status   06/04/2019 35 10 - 44 U/L Final     Anion Gap   Date Value Ref Range Status   06/06/2019 13 8 - 16 mmol/L Final     eGFR if    Date Value Ref Range Status   06/06/2019 >60 >60 mL/min/1.73 m^2 Final     eGFR if non    Date Value Ref Range Status   06/06/2019 >60 >60 mL/min/1.73 m^2 Final     Comment:     Calculation used to obtain the estimated glomerular filtration  rate (eGFR) is the CKD-EPI equation.            Objective:      Physical Exam   Constitutional: She appears well-developed and well-nourished. She is active and cooperative. She has a sickly appearance. She appears ill. She appears distressed.   She is in wheel chair today  Has oxygen on throughout visit  Heaves with vomiting noted during the visit   HENT:   Head: Normocephalic and atraumatic.   Right Ear: Hearing and external ear normal.   Left Ear: Hearing and external ear normal.   Nose: Nose normal.   Mouth/Throat: Uvula is midline, oropharynx is clear and moist and mucous membranes are normal.   Eyes: Lids are normal.   Neck: Trachea normal, normal range of motion, full passive range of motion without pain and phonation normal. Neck supple.   Cardiovascular: Normal rate, regular rhythm and normal heart sounds.   Pulmonary/Chest: Accessory muscle usage present. She has decreased breath sounds.   Decreased bilaterally to bases   Musculoskeletal: Normal range of motion.        Right ankle: She exhibits  swelling.        Left ankle: She exhibits swelling.   Lymphadenopathy:        Head (right side): No submental, no submandibular, no tonsillar, no preauricular, no posterior auricular and no occipital adenopathy present.        Head (left side): No submental, no submandibular, no tonsillar, no preauricular, no posterior auricular and no occipital adenopathy present.     She has no cervical adenopathy.   Neurological: She is alert.   Skin: Skin is warm, dry and intact.   Psychiatric: She has a normal mood and affect. Her speech is normal and behavior is normal. Judgment and thought content normal. Cognition and memory are normal.   Nursing note and vitals reviewed.          Assessment & Plan:       Nausea and vomiting, intractability of vomiting not specified, unspecified vomiting type  -     ondansetron disintegrating tablet 4 mg  -     ondansetron disintegrating tablet 4 mg  -     ondansetron (ZOFRAN-ODT) 8 MG TbDL; Take 1 tablet (8 mg total) by mouth every 6 (six) hours as needed (nausea).  Dispense: 30 tablet; Refill: 0    Swelling of both lower extremities  -     hydroCHLOROthiazide (HYDRODIURIL) 12.5 MG Tab; Take 1 tablet (12.5 mg total) by mouth once daily. Take one on Friday, Sunday and Tuesday  Dispense: 10 tablet; Refill: 0    SOB (shortness of breath)  -     X-Ray Chest PA And Lateral; Future; Expected date: 08/30/2019    Chronic obstructive pulmonary disease, unspecified COPD type    Chronic respiratory failure with hypoxia  -     X-Ray Chest PA And Lateral; Future; Expected date: 08/30/2019    On home oxygen therapy    Low oxygen saturation  -     X-Ray Chest PA And Lateral; Future; Expected date: 08/30/2019    I have spent 45 minutes with patient with more than 50% with coordination of care and monitoring vomiting, lung sounds.  After initial dosage of Zofran states she had great relief of her nausea but after rolling down to xray department her symptoms were beginning to return and an additional 4mg  dosage was given to her. We continue to monitor her for 45 minutes after the second dosage.  I have discussed results of CXRay with patient and spouse during this visit.  I have discussed with  in great detail to take her directly to ED if her vomiting continues or of her SPO2 is consistently less than 90% on her oxygen. They do have a pulse oximeter in the home.   The  verbalized understanding.     To emergency room if worsening shortness of breath  Elevate feet above level of heart  Weigh daily and if 3 pound gain in 1 day go to ED  Eat banana daily        Medication List with Changes/Refills   New Medications    HYDROCHLOROTHIAZIDE (HYDRODIURIL) 12.5 MG TAB    Take 1 tablet (12.5 mg total) by mouth once daily. Take one on Friday, Sunday and Tuesday    ONDANSETRON (ZOFRAN-ODT) 8 MG TBDL    Take 1 tablet (8 mg total) by mouth every 6 (six) hours as needed (nausea).   Current Medications    ALBUTEROL INHL    Inhale 2 puffs into the lungs as needed.     ALBUTEROL-IPRATROPIUM (DUO-NEB) 2.5 MG-0.5 MG/3 ML NEBULIZER SOLUTION    INHALE 3 MILLILITERS BY NEBULIZATION ROUTE 4 TIMES PER DAY    ASMANEX TWISTHALER 220 MCG (120 DOSES) AEPB    INHALE 1 PUFF (220 MCG) BY MOUTH 2 TIMES PER DAY FOR 30 DAYS    ASPIRIN (ECOTRIN) 81 MG EC TABLET    Take 1 tablet (81 mg total) by mouth once daily.    BLOOD-GLUCOSE METER KIT    Use as instructed    CALCIUM CARBONATE (OS-RENALDO) 500 MG CALCIUM (1,250 MG) TABLET    Take 1 tablet by mouth once daily.    FEXOFENADINE HCL (CHILDREN'S ALLEGRA ALLERGY ORAL)    Take 1 tablet by mouth every evening.    FLUOCINOLONE ACETONIDE OIL 0.01 % DROP    INSTILL THREE DROPS INTO AFFECTED EAR TWICE DAILY    FLUTICASONE (FLONASE) 50 MCG/ACTUATION NASAL SPRAY    1 spray (50 mcg total) by Each Nare route once daily.    GABAPENTIN (NEURONTIN) 400 MG CAPSULE    Take two capsules in the morning and afternoon and three capsules at night.    GLIPIZIDE (GLUCOTROL) 5 MG TABLET    Take 1 tablet (5 mg total)  by mouth 2 (two) times daily before meals.    GLYCOPYRROLATE-FORMOTEROL (BEVESPI AEROSPHERE) 9-4.8 MCG HFAA    Inhale 1 puff into the lungs 2 (two) times daily. Controller     LISINOPRIL (PRINIVIL,ZESTRIL) 5 MG TABLET    Take 0.5 tablets (2.5 mg total) by mouth once daily.    METFORMIN (GLUCOPHAGE-XR) 500 MG 24 HR TABLET    Take 1 tablet (500 mg total) by mouth 2 (two) times daily with meals.    METOPROLOL TARTRATE (LOPRESSOR) 25 MG TABLET    Take 1 tablet (25 mg total) by mouth 2 (two) times daily.    NITROGLYCERIN (NITROSTAT) 0.4 MG SL TABLET    Place 1 tablet (0.4 mg total) under the tongue every 5 (five) minutes as needed for Chest pain.    ONETOUCH DELICA LANCETS 33 GAUGE MISC    USE TO TEST BLOOD SUGAR DAILY    ONETOUCH ULTRA BLUE TEST STRIP STRP    USE TO TEST BLOOD SUGAR ONE TIME DAILY    [facility]    USE AS DIRECTED FOR INHALATION. PLEASE RUN FOR INSURANCE PREFERRED SPACER AND PT PREFERRED SIZE.    PANTOPRAZOLE (PROTONIX) 40 MG TABLET    Take 1 tablet (40 mg total) by mouth once daily.    SODIUM CHLORIDE 7% 7 % NEBULIZER SOLUTION    USE IN NEBULIZER AS DIRECTED 3 TIMES A DAY    SUCRALFATE (CARAFATE) 1 GRAM TABLET    Take 1 tablet (1 g total) by mouth 4 (four) times daily. dissolve in 10 mL of water and swallow    THEOPHYLLINE (JESSICA-24) 100 MG 24 HR CAPSULE    Take 100 mg by mouth 2 (two) times daily.    TRAMADOL (ULTRAM) 50 MG TABLET    Take 2 tablets (100 mg total) by mouth every 8 (eight) hours as needed.   Discontinued Medications    MOMETASONE (ASMANEX TWISTHALER) 220 MCG (60 DOSES) AEPB    Inhale 2 puffs into the lungs once daily. Controller    ROFLUMILAST (DALIRESP) 250 MCG TAB    Take by mouth.    TIZANIDINE (ZANAFLEX) 4 MG TABLET    Take 1 tablet (4 mg total) by mouth every 8 (eight) hours as needed.     Follow up if symptoms worsen or fail to improve.

## 2019-09-04 PROBLEM — J43.2 CENTRILOBULAR EMPHYSEMA: Status: ACTIVE | Noted: 2019-01-01

## 2019-09-04 NOTE — PROGRESS NOTES
Subjective:       Patient ID: Tiana Bosch is a 69 y.o. female.    Chief Complaint: Nausea; Emesis; and Edema  She was last seen in primary care by me on 08/30/2019.   She lat saw Faisal on 04/16/2019.  She is accompanied by her spouse.  HPI   She is here to follow up with nausea, vomiting and edema to lower extremities.    states she could not take nausea medication because she would throw up after taking it. Now she is taking emetrol and jennifer root.  No vomiting today, Once on yesterday and one episode on Sunday, a total of 3 episodes since her last visit.  Vitals:    09/04/19 0909   BP: 130/64   Pulse: 102   Temp: 98.4 °F (36.9 °C)     BP Readings from Last 3 Encounters:   09/04/19 130/64   08/30/19 132/84   07/10/19 130/68     Review of Systems    She has lost 6 pounds since her last visit  She states she is eating a bland diet  CMP  Sodium   Date Value Ref Range Status   09/04/2019 132 (L) 136 - 145 mmol/L Final     Potassium   Date Value Ref Range Status   09/04/2019 3.9 3.5 - 5.1 mmol/L Final     Chloride   Date Value Ref Range Status   09/04/2019 93 (L) 95 - 110 mmol/L Final     CO2   Date Value Ref Range Status   09/04/2019 27 23 - 29 mmol/L Final     Glucose   Date Value Ref Range Status   09/04/2019 360 (H) 70 - 110 mg/dL Final     BUN, Bld   Date Value Ref Range Status   09/04/2019 29 (H) 8 - 23 mg/dL Final     Creatinine   Date Value Ref Range Status   09/04/2019 1.5 (H) 0.5 - 1.4 mg/dL Final     Calcium   Date Value Ref Range Status   09/04/2019 9.4 8.7 - 10.5 mg/dL Final     Total Protein   Date Value Ref Range Status   09/04/2019 7.4 6.0 - 8.4 g/dL Final     Albumin   Date Value Ref Range Status   09/04/2019 3.7 3.5 - 5.2 g/dL Final     Total Bilirubin   Date Value Ref Range Status   09/04/2019 1.1 (H) 0.1 - 1.0 mg/dL Final     Comment:     For infants and newborns, interpretation of results should be based  on gestational age, weight and in agreement with  clinical  observations.  Premature Infant recommended reference ranges:  Up to 24 hours.............<8.0 mg/dL  Up to 48 hours............<12.0 mg/dL  3-5 days..................<15.0 mg/dL  6-29 days.................<15.0 mg/dL       Alkaline Phosphatase   Date Value Ref Range Status   09/04/2019 179 (H) 55 - 135 U/L Final     AST   Date Value Ref Range Status   09/04/2019 238 (H) 10 - 40 U/L Final     ALT   Date Value Ref Range Status   09/04/2019 795 (H) 10 - 44 U/L Final     Anion Gap   Date Value Ref Range Status   09/04/2019 12 8 - 16 mmol/L Final     eGFR if    Date Value Ref Range Status   09/04/2019 41.3 (A) >60 mL/min/1.73 m^2 Final     eGFR if non    Date Value Ref Range Status   09/04/2019 35.8 (A) >60 mL/min/1.73 m^2 Final     Comment:     Calculation used to obtain the estimated glomerular filtration  rate (eGFR) is the CKD-EPI equation.        Lab Results   Component Value Date    WBC 7.88 09/04/2019    HGB 12.9 09/04/2019    HCT 41.8 09/04/2019    MCV 94 09/04/2019     09/04/2019     I have reviewed results of her last Echo dated 06/24/2019  Objective:      Physical Exam   Constitutional: She is oriented to person, place, and time. Vital signs are normal. She appears well-developed and well-nourished. She is active and cooperative.   HENT:   Head: Normocephalic and atraumatic.   Right Ear: Hearing, tympanic membrane, external ear and ear canal normal.   Left Ear: Hearing, tympanic membrane, external ear and ear canal normal.   Nose: Nose normal.   Mouth/Throat: Uvula is midline and oropharynx is clear and moist. Mucous membranes are dry.   Slightly dry mucous membrane   Eyes: Lids are normal.   Neck: Trachea normal, normal range of motion, full passive range of motion without pain and phonation normal. Neck supple.   Cardiovascular: Normal rate and regular rhythm.   Pulmonary/Chest: Effort normal. She has decreased breath sounds.   Diminished lung sounds  throughout  Chronic daily oxygen usage   Abdominal: Soft. Bowel sounds are normal. There is no tenderness.   Musculoskeletal: Normal range of motion. She exhibits edema.        Right ankle: She exhibits swelling.        Left ankle: She exhibits swelling.   There is some improvement in fluid to lower extremities since last visit.   Lymphadenopathy:        Head (right side): No submental, no submandibular, no tonsillar, no preauricular, no posterior auricular and no occipital adenopathy present.        Head (left side): No submental, no submandibular, no tonsillar, no preauricular, no posterior auricular and no occipital adenopathy present.     She has no cervical adenopathy.   Neurological: She is alert and oriented to person, place, and time.   Skin: Skin is warm, dry and intact.   Psychiatric: She has a normal mood and affect. Her speech is normal and behavior is normal. Judgment and thought content normal. Cognition and memory are normal.   Nursing note and vitals reviewed.          Assessment & Plan:       Non-intractable vomiting with nausea, unspecified vomiting type  -     Comprehensive metabolic panel; Future; Expected date: 09/04/2019    Edema of both lower extremities  -     Comprehensive metabolic panel; Future; Expected date: 09/04/2019  -     CV US Lower Extremity Veins Bilateral Insufficiency; Future    Centrilobular emphysema- chronic, Home oxygen, followed by pulmonology    On home oxygen therapy- chronic, Followed by pulmonology    Low oxygen saturation- Chronic, followed by pulmonology    Weakness of both legs- Chronic, pending evaluation for mobility device    Hydrate well  Keep follow up with pulmonology       She and  has been instructed go to the ED for any worsening shortness of breath or other concerns.  Again today I have discussed with spouse to take immediately to ED by calling 911 for any respiratory worsening or changes is thought or level of consciousness. He verbalized  understanding.       Medication List with Changes/Refills   Current Medications    ALBUTEROL INHL    Inhale 2 puffs into the lungs as needed.     ALBUTEROL-IPRATROPIUM (DUO-NEB) 2.5 MG-0.5 MG/3 ML NEBULIZER SOLUTION    INHALE 3 MILLILITERS BY NEBULIZATION ROUTE 4 TIMES PER DAY    ASMANEX TWISTHALER 220 MCG (120 DOSES) AEPB    INHALE 1 PUFF (220 MCG) BY MOUTH 2 TIMES PER DAY FOR 30 DAYS    ASPIRIN (ECOTRIN) 81 MG EC TABLET    Take 1 tablet (81 mg total) by mouth once daily.    BLOOD-GLUCOSE METER KIT    Use as instructed    CALCIUM CARBONATE (OS-RENALDO) 500 MG CALCIUM (1,250 MG) TABLET    Take 1 tablet by mouth once daily.    FEXOFENADINE HCL (CHILDREN'S ALLEGRA ALLERGY ORAL)    Take 1 tablet by mouth every evening.    FLUOCINOLONE ACETONIDE OIL 0.01 % DROP    INSTILL THREE DROPS INTO AFFECTED EAR TWICE DAILY    FLUTICASONE (FLONASE) 50 MCG/ACTUATION NASAL SPRAY    1 spray (50 mcg total) by Each Nare route once daily.    GABAPENTIN (NEURONTIN) 400 MG CAPSULE    Take two capsules in the morning and afternoon and three capsules at night.    GLIPIZIDE (GLUCOTROL) 5 MG TABLET    Take 1 tablet (5 mg total) by mouth 2 (two) times daily before meals.    GLYCOPYRROLATE-FORMOTEROL (BEVESPI AEROSPHERE) 9-4.8 MCG HFAA    Inhale 1 puff into the lungs 2 (two) times daily. Controller     HYDROCHLOROTHIAZIDE (HYDRODIURIL) 12.5 MG TAB    Take 1 tablet (12.5 mg total) by mouth once daily. Take one on Friday, Sunday and Tuesday    LISINOPRIL (PRINIVIL,ZESTRIL) 5 MG TABLET    Take 0.5 tablets (2.5 mg total) by mouth once daily.    METFORMIN (GLUCOPHAGE-XR) 500 MG 24 HR TABLET    Take 1 tablet (500 mg total) by mouth 2 (two) times daily with meals.    METOPROLOL TARTRATE (LOPRESSOR) 25 MG TABLET    Take 1 tablet (25 mg total) by mouth 2 (two) times daily.    NITROGLYCERIN (NITROSTAT) 0.4 MG SL TABLET    Place 1 tablet (0.4 mg total) under the tongue every 5 (five) minutes as needed for Chest pain.    ONETOUCH DELICA LANCETS 33 GAUGE [facility]     USE TO TEST BLOOD SUGAR DAILY    ONETOUCH ULTRA BLUE TEST STRIP STRP    USE TO TEST BLOOD SUGAR ONE TIME DAILY    [facility]    USE AS DIRECTED FOR INHALATION. PLEASE RUN FOR INSURANCE PREFERRED SPACER AND PT PREFERRED SIZE.    PANTOPRAZOLE (PROTONIX) 40 MG TABLET    Take 1 tablet (40 mg total) by mouth once daily.    SODIUM CHLORIDE 7% 7 % NEBULIZER SOLUTION    USE IN NEBULIZER AS DIRECTED 3 TIMES A DAY    SUCRALFATE (CARAFATE) 1 GRAM TABLET    Take 1 tablet (1 g total) by mouth 4 (four) times daily. dissolve in 10 mL of water and swallow    THEOPHYLLINE (JESSICA-24) 100 MG 24 HR CAPSULE    Take 100 mg by mouth 2 (two) times daily.    TRAMADOL (ULTRAM) 50 MG TABLET    Take 2 tablets (100 mg total) by mouth every 8 (eight) hours as needed.   Discontinued Medications    ONDANSETRON (ZOFRAN-ODT) 8 MG TBDL    Take 1 tablet (8 mg total) by mouth every 6 (six) hours as needed (nausea).     No follow-ups on file.

## 2019-09-13 NOTE — TELEPHONE ENCOUNTER
Spoke with patient via telephone. Discussed results of CMP on 09/11/2019. Referred to nephrology and nursing staff will move up appt with Faisal within next week.

## 2019-09-13 NOTE — TELEPHONE ENCOUNTER
Call placed to patient,   Advised of appointment made with nephrology, and the staff request to that department for sooner appointment.     The patient asked that we share with provider that she has been experiencing nausea and vomiting over the past week or more, and has only now begun to be able to hold down notable fluid and some food. Shares this because she feels it may have an impact ont he labs that provider has recently seen.     Shared with Ms Childs, who instructed that should patient continue to experience N/V, she should be seen in ED for hydration/evaluation. Called and explained this to patient, advising that continue N/V will result in a continued drop in K+ if not addressed, and well and further strain kidneys that are already functioning lower than we would like.     Voiced understanding and intention to follow through if symptoms persist.

## 2019-09-18 NOTE — TELEPHONE ENCOUNTER
Explained to patient and .  MD out for emergency this am.  Patient declined later appointment.  Rescheduled at next available.

## 2019-09-18 NOTE — PROGRESS NOTES
Subjective:       Patient ID: Tiana Bosch is a 69 y.o. female.    Chief Complaint: mobility evaluation  She is here today for mobility evaluation.  She was last seen in primary care by me on 09/04/2019.  She last saw Faisal on 04/16/2019.  HPI   Patient is here for mobility evaluation and is accompanied by her .   Vitals:    09/18/19 0930   BP: 92/60   Pulse: 76   Temp: 97.7 °F (36.5 °C)     BP Readings from Last 3 Encounters:   09/18/19 92/60   09/04/19 130/64   08/30/19 132/84     SPO2@ 89% today while sitting in wheelchair and on oxygen at 3 liters.    Review of Systems    Patient has a mobility limitation that significantly impairs her ability to participate in numerous ADL's .  She has not been safe in home using cane and walker and has more than 3 falls in past year.   She does not have sufficient energy to self-propel due to extreme SOB and chronic hypoxia with use of continuous oxygen.  She cannot leave her home without physical assistance from another person and does have taxing episode and decreased oxygen level even with human assistance.    She has mental capability and physical capability to operate a power wheel chair.  Patient unable to walk greater than 5 feet without physical assistance.  Here severe physical debility and weakness related to chronic hypoxia greatly limits her activities and ability to safely perform cooking, walking, self-bathing without assistance.   She does not have upper extremity strength and appropriate oxygenation level to use upper extremity to self-propel due to extreme weakness and drop in oxygen saturation.  She cannot safely use a cane or walker due to chronic weakness and hypoxia with noticeable lower extremity weakness and recurrent falls.  Mobility Impairment: Her SPO2 drops at home down below 85% with minimal exertion moving in chair alone.   She is in wheelchair at home with  assistance due to lack of upper strength.   states she has  increased respiration rates with minimal movement.  She continues to need assistance with bathing, cooking and going to bathroom  She can feed herself.  She does have the mental capability to operate equipment safely in her home.   Using a PWC will improve her ability  to participate in more of her home ADL's.      I have reviewed the PT evaluation submitted by Lisa Mancilla PT dated on 08/29/2019 and I agree with her evaluation.    Objective:      Physical Exam   Constitutional: She is oriented to person, place, and time. Vital signs are normal. She appears well-developed and well-nourished. She is active and cooperative. She does not have a sickly appearance.   HENT:   Head: Normocephalic and atraumatic.   Right Ear: Hearing, tympanic membrane, external ear and ear canal normal.   Left Ear: Hearing, tympanic membrane, external ear and ear canal normal.   Nose: Nose normal.   Mouth/Throat: Uvula is midline, oropharynx is clear and moist and mucous membranes are normal.   Eyes: Lids are normal.   Neck: Trachea normal, normal range of motion, full passive range of motion without pain and phonation normal. Neck supple.   Cardiovascular: Normal rate and regular rhythm.   Pulmonary/Chest: Effort normal. She has decreased breath sounds.   Abdominal: Soft.   Musculoskeletal: She exhibits edema.        Right ankle: She exhibits swelling.        Left ankle: She exhibits swelling.   Lymphadenopathy:        Head (right side): No submental, no submandibular, no tonsillar, no preauricular, no posterior auricular and no occipital adenopathy present.        Head (left side): No submental, no submandibular, no tonsillar, no preauricular, no posterior auricular and no occipital adenopathy present.     She has no cervical adenopathy.   Neurological: She is alert and oriented to person, place, and time.   Skin: Skin is warm, dry and intact.   Psychiatric: She has a normal mood and affect. Her speech is normal and behavior is  normal. Judgment and thought content normal. Cognition and memory are normal.   Nursing note and vitals reviewed.      Assessment & Plan:       Chronic respiratory failure with hypoxia- On home oxygen, managed by pulmonary    Impaired mobility and activities of daily living    Physical debility    Abnormality of gait and mobility    Frequent falls    Limited joint range of motion (ROM)    Weakness of both legs    On home oxygen therapy- Chronic,  Managed by pulmonolgy    PAH (pulmonary artery hypertension)    Centrilobular emphysema-Chronic, Managed buy pulmonology    Other orders  -     Influenza - High Dose (65+) (PF) (IM)    I have completed all forms for force to face evaluation for a power wheelchair.  The above diagnosis are supportive for evaluation and need of power wheelchair.  She does have significant safety concerns with in home ambulation with history of falls.   I have spent 40 minutes with patient and  with more than 50% for coordination of care. assessing and asking question for appropriate assessment for power wheelchaitr.      Medication List with Changes/Refills   Current Medications    ALBUTEROL INHL    Inhale 2 puffs into the lungs as needed.     ALBUTEROL-IPRATROPIUM (DUO-NEB) 2.5 MG-0.5 MG/3 ML NEBULIZER SOLUTION    INHALE 3 MILLILITERS BY NEBULIZATION ROUTE 4 TIMES PER DAY    ASMANEX TWISTHALER 220 MCG (120 DOSES) AEPB    INHALE 1 PUFF (220 MCG) BY MOUTH 2 TIMES PER DAY FOR 30 DAYS    ASPIRIN (ECOTRIN) 81 MG EC TABLET    Take 1 tablet (81 mg total) by mouth once daily.    BLOOD-GLUCOSE METER KIT    Use as instructed    CALCIUM CARBONATE (OS-RENALDO) 500 MG CALCIUM (1,250 MG) TABLET    Take 1 tablet by mouth once daily.    FEXOFENADINE HCL (CHILDREN'S ALLEGRA ALLERGY ORAL)    Take 1 tablet by mouth every evening.    FLUOCINOLONE ACETONIDE OIL 0.01 % DROP    INSTILL THREE DROPS INTO AFFECTED EAR TWICE DAILY    FLUTICASONE (FLONASE) 50 MCG/ACTUATION NASAL SPRAY    1 spray (50 mcg total) by  Each Nare route once daily.    GABAPENTIN (NEURONTIN) 400 MG CAPSULE    Take two capsules in the morning and afternoon and three capsules at night.    GLIPIZIDE (GLUCOTROL) 5 MG TABLET    Take 1 tablet (5 mg total) by mouth 2 (two) times daily before meals.    GLYCOPYRROLATE-FORMOTEROL (BEVESPI AEROSPHERE) 9-4.8 MCG HFAA    Inhale 1 puff into the lungs 2 (two) times daily. Controller     HYDROCHLOROTHIAZIDE (HYDRODIURIL) 12.5 MG TAB    Take 1 tablet (12.5 mg total) by mouth once daily. Take one on Friday, Sunday and Tuesday    LISINOPRIL (PRINIVIL,ZESTRIL) 5 MG TABLET    Take 0.5 tablets (2.5 mg total) by mouth once daily.    METFORMIN (GLUCOPHAGE-XR) 500 MG 24 HR TABLET    Take 1 tablet (500 mg total) by mouth 2 (two) times daily with meals.    METOPROLOL TARTRATE (LOPRESSOR) 25 MG TABLET    Take 1 tablet (25 mg total) by mouth 2 (two) times daily.    NITROGLYCERIN (NITROSTAT) 0.4 MG SL TABLET    Place 1 tablet (0.4 mg total) under the tongue every 5 (five) minutes as needed for Chest pain.    ONETOUCH DELICA LANCETS 33 GAUGE MISC    USE TO TEST BLOOD SUGAR DAILY    ONETOUCH ULTRA BLUE TEST STRIP STRP    USE TO TEST BLOOD SUGAR ONE TIME DAILY    [facility]    USE AS DIRECTED FOR INHALATION. PLEASE RUN FOR INSURANCE PREFERRED SPACER AND PT PREFERRED SIZE.    PANTOPRAZOLE (PROTONIX) 40 MG TABLET    Take 1 tablet (40 mg total) by mouth once daily.    SODIUM CHLORIDE 7% 7 % NEBULIZER SOLUTION    USE IN NEBULIZER AS DIRECTED 3 TIMES A DAY    SUCRALFATE (CARAFATE) 1 GRAM TABLET    Take 1 tablet (1 g total) by mouth 4 (four) times daily. dissolve in 10 mL of water and swallow    TRAMADOL (ULTRAM) 50 MG TABLET    Take 2 tablets (100 mg total) by mouth every 8 (eight) hours as needed.   Discontinued Medications    THEOPHYLLINE (JESSICA-24) 100 MG 24 HR CAPSULE    Take 100 mg by mouth 2 (two) times daily.     Follow up if symptoms worsen or fail to improve.
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
## 2019-09-27 NOTE — LETTER
September 27, 2019      Talita Childs DNP, APRN  [address] [facility] [address] [place] [address]           [facility]  [address] [place] [address]  Phone: [phone]          Patient: Tiana Bosch   MR Number: [id number]   YOB: 1950   Date of Visit: 9/27/2019       Dear Talita Childs:    Thank you for referring Tiana Bosch to me for evaluation. Attached you will find relevant portions of my assessment and plan of care.    If you have questions, please do not hesitate to call me. I look forward to following Tiana Bosch along with you.    Sincerely,    Tadeo Chino MD    Enclosure  CC:  No Recipients    If you would like to receive this communication electronically, please contact [email or web address] or [phone] to request more information on [employer] access.    For providers and/or their staff who would like to refer a patient to [facility], please contact us through our one-stop-shop provider referral line, [facility], at [phone].    If you feel you have received this communication in error or would no longer like to receive these types of communications, please e-mail [email or web address]

## 2019-09-30 NOTE — TELEPHONE ENCOUNTER
Patient had a high potassium resulted .  I spoke to them late Friday evening.  They declined to go to ER.  Her  did states they had a very hard time getting her blood.  It appears after speaking with lab the specimen was hemolyzed.  Plan to recheck in am.

## 2019-09-30 NOTE — TELEPHONE ENCOUNTER
Spoke with  and apologized for that happening.  Assured him I would personally make sure there was an order linked to an appointment for them . It is a hardship to get her in a wheelchair and get her up here.

## 2019-10-02 PROBLEM — R11.2 NAUSEA & VOMITING: Status: ACTIVE | Noted: 2019-01-01

## 2019-10-02 PROBLEM — E87.5 HYPERKALEMIA: Status: ACTIVE | Noted: 2019-01-01

## 2019-10-02 NOTE — PROGRESS NOTES
Subjective:       Patient ID: Tiana Bosch is a 69 y.o. female.    Chief Complaint: Nausea and Emesis  Patient was last seen by me on 09/18/2019.  Last seen by PCP on 04/16/2019.  HPI   She is here today with complaints of continued nausea and vomiting.  She did stop the lisinopril today.   state promethazine has her too drowsy and could not function.   states the Zofran does not help the symptoms at all.  Vitals:    10/02/19 0849   BP: 122/78   Pulse: 72   Temp: 97.8 °F (36.6 °C)     Review of Systems    CMP  Sodium   Date Value Ref Range Status   10/01/2019 135 (L) 136 - 145 mmol/L Final     Potassium   Date Value Ref Range Status   10/01/2019 6.4 (HH) 3.5 - 5.1 mmol/L Final     Comment:     K+  critical result(s) called and verbal readback obtained from   JUD HERNANDEZ  SPECIMEN SLIGHTLY HEMOLYZED, 10/01/2019 10:50       Chloride   Date Value Ref Range Status   10/01/2019 100 95 - 110 mmol/L Final     CO2   Date Value Ref Range Status   10/01/2019 19 (L) 23 - 29 mmol/L Final     Glucose   Date Value Ref Range Status   10/01/2019 179 (H) 70 - 110 mg/dL Final     BUN, Bld   Date Value Ref Range Status   10/01/2019 38 (H) 8 - 23 mg/dL Final     Creatinine   Date Value Ref Range Status   10/01/2019 1.7 (H) 0.5 - 1.4 mg/dL Final     Calcium   Date Value Ref Range Status   10/01/2019 9.8 8.7 - 10.5 mg/dL Final     Total Protein   Date Value Ref Range Status   09/11/2019 7.1 6.0 - 8.4 g/dL Final     Albumin   Date Value Ref Range Status   10/01/2019 3.3 (L) 3.5 - 5.2 g/dL Final     Total Bilirubin   Date Value Ref Range Status   09/11/2019 0.8 0.1 - 1.0 mg/dL Final     Comment:     For infants and newborns, interpretation of results should be based  on gestational age, weight and in agreement with clinical  observations.  Premature Infant recommended reference ranges:  Up to 24 hours.............<8.0 mg/dL  Up to 48 hours............<12.0 mg/dL  3-5 days..................<15.0 mg/dL  6-29  "days.................<15.0 mg/dL       Alkaline Phosphatase   Date Value Ref Range Status   09/11/2019 138 (H) 55 - 135 U/L Final     AST   Date Value Ref Range Status   09/11/2019 44 (H) 10 - 40 U/L Final     ALT   Date Value Ref Range Status   09/11/2019 149 (H) 10 - 44 U/L Final     Anion Gap   Date Value Ref Range Status   10/01/2019 16 8 - 16 mmol/L Final     eGFR if    Date Value Ref Range Status   10/01/2019 35 (A) >60 mL/min/1.73 m^2 Final     eGFR if non    Date Value Ref Range Status   10/01/2019 31 (A) >60 mL/min/1.73 m^2 Final     Comment:     Calculation used to obtain the estimated glomerular filtration  rate (eGFR) is the CKD-EPI equation.        Objective:      Physical Exam   Constitutional: She is active and cooperative. She does not have a sickly appearance. She does not appear ill. No distress.   She is having such sever heaving during her visit today that she is extremely fatigued and states "it is taking so much energy".   HENT:   Head: Normocephalic and atraumatic.   Right Ear: Hearing, tympanic membrane, external ear and ear canal normal.   Left Ear: Hearing, tympanic membrane, external ear and ear canal normal.   Nose: Nose normal. Right sinus exhibits no maxillary sinus tenderness and no frontal sinus tenderness. Left sinus exhibits no maxillary sinus tenderness and no frontal sinus tenderness.   Mouth/Throat: Uvula is midline and oropharynx is clear and moist. Mucous membranes are dry.   Eyes: Lids are normal.   Neck: Trachea normal, normal range of motion, full passive range of motion without pain and phonation normal. Neck supple.   Cardiovascular: Normal rate and regular rhythm.   Lymphadenopathy:        Head (right side): No submental, no submandibular, no tonsillar, no preauricular, no posterior auricular and no occipital adenopathy present.        Head (left side): No submental, no submandibular, no tonsillar, no preauricular, no posterior auricular " and no occipital adenopathy present.     She has no cervical adenopathy.   Neurological: She is alert.   Psychiatric: She has a normal mood and affect. Her speech is normal and behavior is normal. Judgment and thought content normal. Cognition and memory are normal.   Nursing note and vitals reviewed.      Assessment & Plan:       Nausea and vomiting in adult  -     Ambulatory referral to Gastroenterology    Acute hyperkalemia  -     Ambulatory referral to Gastroenterology    Lower leg edema  -     Ambulatory referral to Gastroenterology    Other fatigue    Anxiety    On home oxygen therapy    She is heaving throughout her visit today and appears much more ill today than on prior visit.  She cannot stop heaving and I have instructed  to take her directly to ED for evaluation because of concerns with hyperkalemia and continued symptoms. Concerned for dehydration and worsening kidney dysfunction and cardiac dysrhythmias that can occur with her electrolyte abnormality.  I have requested the  take her directly to ED for evaluation. He verbalized understanding and states will take her directly there.       Called report to Jacquie nurse at [facility]   Medication List with Changes/Refills   Current Medications    ALBUTEROL INHL    Inhale 2 puffs into the lungs as needed.     ALBUTEROL-IPRATROPIUM (DUO-NEB) 2.5 MG-0.5 MG/3 ML NEBULIZER SOLUTION    INHALE 3 MILLILITERS BY NEBULIZATION ROUTE 4 TIMES PER DAY    ASMANEX TWISTHALER 220 MCG (120 DOSES) AEPB    INHALE 1 PUFF (220 MCG) BY MOUTH 2 TIMES PER DAY FOR 30 DAYS    ASPIRIN (ECOTRIN) 81 MG EC TABLET    Take 1 tablet (81 mg total) by mouth once daily.    BLOOD-GLUCOSE METER KIT    Use as instructed    CALCIUM CARBONATE (OS-RENALDO) 500 MG CALCIUM (1,250 MG) TABLET    Take 1 tablet by mouth once daily.    FEXOFENADINE HCL (CHILDREN'S ALLEGRA ALLERGY ORAL)    Take 1 tablet by mouth every evening.    FLUOCINOLONE ACETONIDE OIL 0.01 % DROP    INSTILL THREE DROPS INTO AFFECTED  EAR TWICE DAILY    FLUTICASONE (FLONASE) 50 MCG/ACTUATION NASAL SPRAY    1 spray (50 mcg total) by Each Nare route once daily.    GABAPENTIN (NEURONTIN) 400 MG CAPSULE    Take two capsules in the morning and afternoon and three capsules at night.    GLIPIZIDE (GLUCOTROL) 5 MG TABLET    Take 1 tablet (5 mg total) by mouth 2 (two) times daily before meals.    GLYCOPYRROLATE-FORMOTEROL (BEVESPI AEROSPHERE) 9-4.8 MCG HFAA    Inhale 1 puff into the lungs 2 (two) times daily. Controller     HYDROCHLOROTHIAZIDE (HYDRODIURIL) 12.5 MG TAB    Take 1 tablet (12.5 mg total) by mouth once daily. Take one on Friday, Sunday and Tuesday    LISINOPRIL (PRINIVIL,ZESTRIL) 5 MG TABLET    Take 0.5 tablets (2.5 mg total) by mouth once daily.    METFORMIN (GLUCOPHAGE-XR) 500 MG 24 HR TABLET    Take 1 tablet (500 mg total) by mouth 2 (two) times daily with meals.    METOPROLOL TARTRATE (LOPRESSOR) 25 MG TABLET    Take 1 tablet (25 mg total) by mouth 2 (two) times daily.    NITROGLYCERIN (NITROSTAT) 0.4 MG SL TABLET    Place 1 tablet (0.4 mg total) under the tongue every 5 (five) minutes as needed for Chest pain.    ONETOUCH DELICA LANCETS 33 GAUGE MISC    USE TO TEST BLOOD SUGAR DAILY    ONETOUCH ULTRA BLUE TEST STRIP STRP    USE TO TEST BLOOD SUGAR ONE TIME DAILY    [facility]    USE AS DIRECTED FOR INHALATION. PLEASE RUN FOR INSURANCE PREFERRED SPACER AND PT PREFERRED SIZE.    PANTOPRAZOLE (PROTONIX) 40 MG TABLET    Take 1 tablet (40 mg total) by mouth once daily.    SODIUM CHLORIDE 7% 7 % NEBULIZER SOLUTION    USE IN NEBULIZER AS DIRECTED 3 TIMES A DAY    SUCRALFATE (CARAFATE) 1 GRAM TABLET    Take 1 tablet (1 g total) by mouth 4 (four) times daily. dissolve in 10 mL of water and swallow    TRAMADOL (ULTRAM) 50 MG TABLET    Take 2 tablets (100 mg total) by mouth every 8 (eight) hours as needed.   Discontinued Medications    PROMETHAZINE (PHENERGAN) 12.5 MG TAB    Take 12.5 mg by mouth every 8 (eight) hours as needed.     No  follow-ups on file.

## 2019-10-03 PROBLEM — I27.20 PULMONARY HYPERTENSION: Status: ACTIVE | Noted: 2019-01-01

## 2019-10-03 PROBLEM — J44.9 STAGE 4 VERY SEVERE COPD BY GOLD CLASSIFICATION: Status: ACTIVE | Noted: 2018-10-25

## 2019-10-11 PROBLEM — G93.40 ACUTE ENCEPHALOPATHY: Status: ACTIVE | Noted: 2019-01-01

## 2019-10-21 PROBLEM — E87.5 HYPERKALEMIA: Status: RESOLVED | Noted: 2019-01-01 | Resolved: 2019-01-01

## 2019-10-21 NOTE — PROGRESS NOTES
Subjective:       Patient ID: Tiana Bosch is a 69 y.o. female.    Chief Complaint: Leg Swelling    Here for f/u chf and chronic medical issues. Here with  today. Doing well overall since last hospital stay overall. Following with cardiology as well.  Copd currently stable.    Hypertension   This is a chronic problem. The current episode started more than 1 year ago. The problem is controlled. Pertinent negatives include no chest pain, palpitations or shortness of breath.   Hyperlipidemia   This is a chronic problem. The current episode started more than 1 year ago. Pertinent negatives include no chest pain or shortness of breath.     Review of Systems   Constitutional: Negative for chills, fatigue and fever.   Respiratory: Negative for cough, chest tightness and shortness of breath.    Cardiovascular: Negative for chest pain, palpitations and leg swelling.   Endocrine: Negative for cold intolerance and heat intolerance.   Skin: Negative for rash.   Psychiatric/Behavioral: Negative for dysphoric mood. The patient is not nervous/anxious.        Objective:      Physical Exam   Constitutional: She appears well-developed and well-nourished.   HENT:   Head: Normocephalic and atraumatic.   Cardiovascular: Normal rate, regular rhythm and normal heart sounds.   Pulmonary/Chest: Effort normal and breath sounds normal.   Psychiatric: She has a normal mood and affect.   Nursing note and vitals reviewed.      Assessment:       1. Essential hypertension    2. Pulmonary hypertension    3. Mixed hyperlipidemia    4. Type 2 diabetes mellitus with diabetic polyneuropathy, without long-term current use of insulin        Plan:       Essential hypertension  -     Comprehensive metabolic panel; Future; Expected date: 10/21/2019    Pulmonary hypertension    Mixed hyperlipidemia    Type 2 diabetes mellitus with diabetic polyneuropathy, without long-term current use of insulin    Other orders  -     furosemide (LASIX) 20 MG  tablet; Take 1 tablet (20 mg total) by mouth 2 (two) times daily. (Patient taking differently: Take 40 mg by mouth 2 (two) times daily. )  Dispense: 60 tablet; Refill: 1  -     triamcinolone acetonide 0.1% (KENALOG) 0.1 % cream; Apply topically 2 (two) times daily as needed.  Dispense: 45 g; Refill: 1      For now increase lasix to 20 mg bid and f/u with cardiology next week as planned. Repeat cmp today from pulm and will repeat next week as well for electrolytes.  Will monitor chronic medical issues and continue current plan of care.      Follow up in about 1 month (around 11/21/2019), or if symptoms worsen or fail to improve.

## 2019-10-21 NOTE — TELEPHONE ENCOUNTER
"New lab in; looks like they used "paper order" from outside provider but regardless new cmp in for next week  "

## 2019-10-23 NOTE — PATIENT INSTRUCTIONS
Using the Amsler Grid  If you are at risk for vision loss, you may be told to check your eyesight regularly using the Amsler grid. Below is the grid and instructions for using it.         The Amsler grid helps you track changes in your vision.    How to Use the Amsler Grid  1. Use the grid in a well-lighted area.  2. Wear glasses or contact lenses if you usually wear them.  3. Hold the grid at your normal reading distance (about 16 inches).  4. Cover your left eye.  5. With your right eye, look at the dot in the center of the grid.  6. While looking at the dot, notice if any of the lines look wavy, if any lines disappear, or if the boxes change shape.  7. Write down on a piece of paper any vision changes from the last time you used the grid.  8. Now repeat the exercise, this time covering your right eye.  9. Call your doctor right away if you notice any vision changes.  How Often Should I Check My Vision?  Use the Amsler grid as often as your eye doctor suggests. Keep the grid where youll remember to use it. Call your eye doctor right away if you notice any changes with your eyesight. This includes if your vision improves.  © 7888-8469 [employer], [address] [place] [address], [place] [address]. All rights reserved. This information is not intended as a substitute for professional medical care. Always follow your healthcare professional's instructions.

## 2019-10-23 NOTE — PROGRESS NOTES
HPI     DLE- 10/02/18     Pt is here for routine diabetic eye exam. Pt states she will often notice   her OS constantly blur. Denies eye pain. Pt states diabetes is controlled   with meds. No new floaters in [facility]. Currently uses Systaine x4 in OU    Hemoglobin A1C       Date                     Value               Ref Range             Status                10/02/2019               8.8 (H)             0.0 - 5.6 %           Final                  04/30/2019               7.0 (H)             4.0 - 5.6 %           Final                 10/12/2018               7.3 (H)             4.0 - 5.6 %           Final                  Agree above  Notes [facility] reduced OS>OD  Did not change specs last exam per my rec  Was not able to visit w/ Maxine for low vision due to health issues  (scheduled in Dec 18 and March 19)    Last edited by LO Jaramillo, OD on 10/23/2019  2:27 PM. (History)        ROS     Positive for: Eyes    Negative for: Constitutional, Gastrointestinal, Neurological, Skin,   Genitourinary, Musculoskeletal, HENT, Endocrine, Cardiovascular,   Respiratory, Psychiatric, Allergic/Imm, Heme/Lymph    Last edited by LO Jaramillo, OD on 10/23/2019  2:26 PM. (History)        Assessment /Plan     For exam results, see Encounter Report.    Diabetes mellitus type 2 without retinopathy    Intermediate stage nonexudative age-related macular degeneration of both eyes    Posterior vitreous detachment, bilateral      1. No ret/ no csme, gave info, control glucose, annual DFE  Good retina health for DM2 duration   2. Retina health appears stable OU from previous  Can repeat OCT next exam  3. RD precautions given, reviewed    PCIOL OU 2013 BY DR. RONQUILLO  ERM OD  S/P AVASTIN OU X BY DR. LIMA  Dry AMD--currently   Choroidal Nevus OD    RTC for low vision eval w/ Dr Goodman, interest in improving vision to watch tv if possible    Annual exam, IOP/ DFE pending above

## 2019-10-30 PROBLEM — I50.813 ACUTE ON CHRONIC RIGHT-SIDED HEART FAILURE: Status: ACTIVE | Noted: 2019-01-01

## 2019-10-30 PROBLEM — I50.813 ACUTE ON CHRONIC RIGHT HEART FAILURE: Status: ACTIVE | Noted: 2019-01-01

## 2019-10-30 NOTE — PATIENT INSTRUCTIONS
Increase lasix to 40mg by mouth twice a day  Continue until she loses 10 lbs of weight, then go back to 20mg by mouth twice a day        Cardiomems    Arrive for procedure at: [facility] on 11/14/19. Your procedure is at 10 am.     You will receive a phone call from [facility] Pre-Op Department with further instructions prior to your scheduled procedure.    Notify the nurse if you are ALLERGIC TO IODINE.    FASTING: You MAY NOT have anything to eat or drink AFTER MIDNIGHT the day before your procedure. If your procedure is scheduled in the afternoon, you may have a LIGHT BREAKFAST 6-8 hours prior to your procedure.  For example: Two slices of toast; black coffee or black tea.    MEDICATIONS: You may take your regular morning medications with water. If there are any medications that you should not take, you will be instructed to hold them for that morning.    ? CARDIOLOGY PRE-PROCEDURE MEDICATION ORDERS:  ** Please hold any medications that are checked below:    HOLD   # OF DAYS TO HOLD  ? Metformin    Day before procedure & morning of procedure  CONTINUE the Following Medications   ?    ? Aspirin    WHAT TO EXPECT:    How long will the procedure take?  The procedure will take an average of 1 - 2 hours to perform.  After the procedure, you will need to lay flat for around 4 - 6 hours to minimize bleeding from the puncture site. If the wrist is accessed you will need to keep your arm still as instructed by the nurse.    When can I go home?  You may be able to be discharged home that same afternoon if there were no complications.  If you have one of the following: balloon; stent; pacemaker or defibrillator procedures, you may spend one night for observation.  Your doctor will determine your discharge based upon your progress.  The results of your procedure will be discussed with you before you are discharged.  Any further testing or procedures will be scheduled for you either before you leave or you will  be instructed to call for a future appointment.      TRANSPORTATION:  PLEASE ARRANGE TO HAVE SOMEONE DRIVE YOU HOME FOLLOWING YOUR PROCEDURE, YOU WILL NOT BE ALLOWED TO DRIVE.

## 2019-10-30 NOTE — PROGRESS NOTES
"Subjective:    Patient ID:  Tiana Bosch is a 69 y.o. female who presents for follow-up of Edema (post hosp f/u - abdomen, bilat legs.  ) and Chest Pain (chest discomfort on Lt side of chest with "flutters" across the chest.  Pnscale 4/10 )      Problem List Items Addressed This Visit        Cardiac/Vascular    Coronary artery disease involving native coronary artery of native heart without angina pectoris - Primary    Essential hypertension    Mixed hyperlipidemia       Other    Statin intolerance    Overview     Severe myalgias               HPI    Patient was last seen in this clinic on 06/24/2019 at which time an echocardiogram was ordered to reassess her pulmonary pressures in a dobutamine nuclear stress test was proposed for June 2020.  Since that time, the patient was admitted from October 10th 2019 through October 11, 2019 [facility] for altered mental status that resolved with supportive care that was thought to be related to acute hypoxemia secondary to lack of oxygen use.  She was also recently seen by Dr. Malone who recommended Lasix therapy for the patient's swollen legs.    On assessment today, the patient states that she is still significantly TAVERA. Gaining weight. + leg swelling.       Review of Systems   Constitution: Negative for decreased appetite, fever and malaise/fatigue.   Eyes: Negative for blurred vision.   Cardiovascular: Negative for chest pain, dyspnea on exertion, irregular heartbeat and leg swelling.   Respiratory: Negative for cough, hemoptysis, shortness of breath and wheezing.    Endocrine: Negative for cold intolerance and heat intolerance.   Hematologic/Lymphatic: Negative for bleeding problem.   Musculoskeletal: Negative for muscle weakness and myalgias.   Gastrointestinal: Negative for abdominal pain, constipation and diarrhea.   Genitourinary: Negative for bladder incontinence.   Neurological: Negative for dizziness and weakness.   Psychiatric/Behavioral: " "Negative for depression.        Objective:     Vitals:    10/30/19 0929   BP: 110/70   BP Location: Right arm   Patient Position: Sitting   BP Method: Medium (Manual)   Pulse: 80   Weight: 82.6 kg (182 lb)   Height: 5' 2" (1.575 m)        Physical Exam   Constitutional: She is oriented to person, place, and time. She appears well-developed and well-nourished. No distress.   HENT:   Head: Normocephalic and atraumatic.   Neck: Neck supple. No JVD present.   Cardiovascular: Normal rate, regular rhythm and normal heart sounds. Exam reveals no gallop and no friction rub.   No murmur heard.  Pulmonary/Chest: Effort normal and breath sounds normal. No respiratory distress. She has no wheezes. She has no rales.   Abdominal: Soft. Bowel sounds are normal. There is no tenderness. There is no rebound and no guarding.   Musculoskeletal: She exhibits no edema or tenderness.   Neurological: She is alert and oriented to person, place, and time.   Skin: Skin is warm and dry.   Psychiatric: Her behavior is normal.           Current Outpatient Medications on File Prior to Visit   Medication Sig    albuterol (PROVENTIL/VENTOLIN HFA) 90 mcg/actuation inhaler Inhale 2 puffs into the lungs daily as needed for Wheezing or Shortness of Breath. Rescue     albuterol-ipratropium (DUO-NEB) 2.5 mg-0.5 mg/3 mL nebulizer solution Take 3 mLs by nebulization every 4 (four) hours.     ASMANEX TWISTHALER 220 mcg (120 doses) AePB Take 1 puff by mouth 2 (two) times daily.     aspirin (ECOTRIN) 81 MG EC tablet Take 81 mg by mouth once daily.    BLOOD-GLUCOSE METER MISC by Misc.(Non-Drug; Combo Route) route.    calcium carbonate (OS-RENALDO) 500 mg calcium (1,250 mg) tablet Take 1 tablet by mouth once daily.    fexofenadine (CHILDREN'S ALLEGRA ALLERGY) 30 mg TbDL Take 1 tablet by mouth nightly as needed (allergies).    fluocinolone acetonide oil 0.01 % Drop Place 3 drops in ear(s) 2 (two) times daily as needed (into affected ear).    fluticasone " propionate (FLONASE) 50 mcg/actuation nasal spray 1 spray by Each Nostril route daily as needed for Allergies.     furosemide (LASIX) 20 MG tablet Take 1 tablet (20 mg total) by mouth 2 (two) times daily.    gabapentin (NEURONTIN) 400 MG capsule Take 2 capsules (800 mg total) by mouth 3 (three) times daily.    glipiZIDE (GLUCOTROL) 5 MG tablet Take 1 tablet (5 mg total) by mouth 2 (two) times daily before meals.    glycopyrrolate-formoterol (BEVESPI AEROSPHERE) 9-4.8 mcg HFAA Inhale 1 puff into the lungs 2 (two) times daily. Controller     metFORMIN (GLUCOPHAGE-XR) 500 MG 24 hr tablet Take 1 tablet (500 mg total) by mouth 2 (two) times daily with meals.    metoprolol tartrate (LOPRESSOR) 25 MG tablet Take 1 tablet (25 mg total) by mouth 2 (two) times daily.    nitroGLYCERIN (NITROSTAT) 0.4 MG SL tablet Place 1 tablet (0.4 mg total) under the tongue every 5 (five) minutes as needed for Chest pain.    ONETOUCH DELICA LANCETS 33 gauge Misc USE TO TEST BLOOD SUGAR DAILY    ONETOUCH ULTRA BLUE TEST STRIP Str USE TO TEST BLOOD SUGAR ONE TIME DAILY    [facility] USE AS DIRECTED FOR INHALATION. PLEASE RUN FOR INSURANCE PREFERRED SPACER AND PT PREFERRED SIZE.    pantoprazole (PROTONIX) 40 MG tablet Take 1 tablet (40 mg total) by mouth once daily.    prochlorperazine (COMPAZINE) 10 MG tablet Take 1 tablet (10 mg total) by mouth 3 (three) times daily as needed for Nausea.    sodium chloride 7% 7 % nebulizer solution Take 4 mLs by nebulization 3 (three) times daily as needed.     sucralfate (CARAFATE) 1 gram tablet Take 1 g by mouth 4 (four) times daily as needed (indigestion dissolve in 10mL of water and swallow).    tramadol (ULTRAM) 50 mg tablet Take 2 tablets (100 mg total) by mouth every 8 (eight) hours as needed. (Patient taking differently: Take 100 mg by mouth every 8 (eight) hours as needed for Pain. )    triamcinolone acetonide 0.1% (KENALOG) 0.1 % cream Apply topically 2 (two) times  daily as needed.    [DISCONTINUED] calcium & magnesium carbonates (MYLANTA) 311-232 mg per tablet Take 1 tablet by mouth once daily.     No current facility-administered medications on file prior to visit.        Lipid Panel:   Lab Results   Component Value Date    CHOL 160 05/20/2019    HDL 50 05/20/2019    LDLCALC 76.6 05/20/2019    TRIG 167 (H) 05/20/2019    CHOLHDL 31.3 05/20/2019       The ASCVD Risk score (Yehuda EMANUEL Byers., et al., 2013) failed to calculate for the following reasons:    The patient has a prior MI or stroke diagnosis    All pertinent labs, imaging, and EKGs reviewed.    Assessment:       1. Coronary artery disease involving native coronary artery of native heart without angina pectoris    2. Essential hypertension    3. Mixed hyperlipidemia    4. Statin intolerance         Plan:     Symptoms of ADHF, NYHA III  BP/Pulse OK today  Volume overloaded today  Recent echocardiogram with preserved ejection fraction and reportedly normal diastolic function    Right sided failure    Increase lasix to 40mg PO BID  Continue until she loses 10 lbs of weight, then go back to 20mg PO BID  BMP/Mag in 1 week  Call in 1 week with updated weights  Continue follow up with Dr. Cronin, can consider referral to Dr. Starks in the future if needed    Will schedule CardioMEMs device implantation, may need IV diuresis at that time    Patient presents with NYHA class III symptoms and has had a previous heart failure admission. They have been on a good and stable heart failure regimen. They remain at very high risk for heart failure readmission and would benefit from the CardioMEMS device. In a recent large study, this device has shown to reduce heart failure admissions by 50% when compared to standard of care.     Considered dobutamine stress nuclear in the future if symptoms persist when euvolemic    Continue other cardiac medications  Mediterranean Diet/Cardiovascular Exercise Program    Patient queried and all  questions were answered.    F/u in middle of December as scheduled      Signed:    Ghassan Mckinney MD  10/30/2019 8:43 AM

## 2019-10-31 NOTE — PROGRESS NOTES
Refill Authorization Note     is requesting a refill authorization.    Brief assessment and rationale for refill: APPROVE: prr  Name and strength of medication: glipiZIDE (GLUCOTROL) 5 MG tablet // metFORMIN (GLUCOPHAGE-XR) 500 MG 24 hr tablet       Medication Therapy Plan: A1C>8 on last lab but is usually wnl; FOVS(11/19); approve 3 more    Medication reconciliation completed: No              How patient will take medication: t1t po bid before meals // t1t po bid with meals          Comments:   A1C: 6 months  Lab Results   Component Value Date    HGBA1C 8.8 (H) 10/02/2019    HGBA1C 7.0 (H) 04/30/2019    HGBA1C 7.3 (H) 10/12/2018    No results found for: LABA1C     Last Kidney  Lab Results   Component Value Date    CREATININE 1.5 (H) 10/30/2019    CREATININE 1.1 10/21/2019    CREATININE 0.97 10/12/2019     Lab Results   Component Value Date    K 4.5 10/30/2019    K 4.3 10/21/2019    K 5.0 10/12/2019     Lab Results   Component Value Date     (L) 10/30/2019     (L) 10/21/2019     (L) 10/12/2019      Lab Results   Component Value Date    BUN 27 (H) 10/30/2019    BUN 24 (H) 10/21/2019    BUN 33 (H) 10/12/2019     Lab Results   Component Value Date    CO2 18 (L) 10/30/2019    CO2 24 10/21/2019    CO2 27 10/12/2019            Appointments past 12m or future 3m    Date Provider   Last Visit   10/21/2019 MARY JO Malone MD   Next Visit   11/25/2019 MARY JO Malone MD

## 2019-10-31 NOTE — PROGRESS NOTES
Refill Authorization Note     is requesting a refill authorization.    Brief assessment and rationale for refill: APPROVE: prr  Name and strength of medication: glipiZIDE (GLUCOTROL) 5 MG tablet // metFORMIN (GLUCOPHAGE-XR) 500 MG 24 hr tablet       Medication Therapy Plan: pt on recommended metformin dose for eGFR >30 mL/min    Medication reconciliation completed: No              How patient will take medication: t1t po bid before meals // t1t po bid with meals         Appointments past 12m or future 3m    Date Provider   Last Visit   10/21/2019 MARY JO Malone MD   Next Visit   11/25/2019 MARY JO Malone MD       Comments:   Requested Prescriptions     Pending Prescriptions Disp Refills    glipiZIDE (GLUCOTROL) 5 MG tablet [Pharmacy Med Name: GLIPIZIDE 5 MG TABLET] 180 tablet 0     Sig: TAKE 1 TABLET (5 MG TOTAL) BY MOUTH 2 (TWO) TIMES DAILY BEFORE MEALS.     Requested Prescriptions   Pending Prescriptions Disp Refills    metFORMIN (GLUCOPHAGE-XR) 500 MG 24 hr tablet [Pharmacy Med Name: METFORMIN HCL  MG TABLET] 180 tablet 0     Sig: TAKE 1 TABLET BY MOUTH TWICE A DAY WITH MEALS       Endocrinology:  Diabetes - Biguanides Failed - 10/31/2019  2:22 PM        Failed - Cr is 1.3 or below and within 360 days     Creatinine   Date Value Ref Range Status   10/30/2019 1.5 (H) 0.5 - 1.4 mg/dL Final   10/21/2019 1.1 0.5 - 1.4 mg/dL Final   10/12/2019 0.97 0.50 - 1.40 mg/dL Final     POC Creatinine   Date Value Ref Range Status   09/05/2018 1.0 0.5 - 1.4 mg/dL Final              Failed - HBA1C is 7.9 or below and within 180 days     Hemoglobin A1C   Date Value Ref Range Status   10/02/2019 8.8 (H) 0.0 - 5.6 % Final     Comment:     Reference Interval:  5.0 - 5.6 Normal   5.7 - 6.4 High Risk   > 6.5 Diabetic    Hgb A1c results are standardized based on the (NGSP) National   Glycohemoglobin Standardization Program.    Hemoglobin A1C levels are related to mean serum/plasma glucose   during the  preceding 2-3 months.        04/30/2019 7.0 (H) 4.0 - 5.6 % Final     Comment:     ADA Screening Guidelines:  5.7-6.4%  Consistent with prediabetes  >or=6.5%  Consistent with diabetes  High levels of fetal hemoglobin interfere with the HbA1C  assay. Heterozygous hemoglobin variants (HbS, HgC, etc)do  not significantly interfere with this assay.   However, presence of multiple variants may affect accuracy.     10/12/2018 7.3 (H) 4.0 - 5.6 % Final     Comment:     ADA Screening Guidelines:  5.7-6.4%  Consistent with prediabetes  >or=6.5%  Consistent with diabetes  High levels of fetal hemoglobin interfere with the HbA1C  assay. Heterozygous hemoglobin variants (HbS, HgC, etc)do  not significantly interfere with this assay.   However, presence of multiple variants may affect accuracy.                Passed - Patient is at least 18 years old        Passed - Valid encounter within last 12 months     Recent Outpatient Visits            Yesterday Acute on chronic right-sided heart failure    [facility] Card[facility] Ghassan Mckinney MD    1 week ago Diabetes mellitus type 2 without retinopathy    [facility] LO Jaramillo, ZOE    1 week ago Essential hypertension    [facility] Medicine MARY JO Malone MD    4 weeks ago Nausea and vomiting in adult    [facility] Medicine Talita Childs, DNP, APRN    1 month ago Acute renal failure, unspecified acute renal failure type    [facility] Nephrology Tdaeo Chino MD          Future Appointments              In 6 days LAB, [place] [facility] [place]                Passed - eGFR is 30 or above and within 360 days     eGFR if non    Date Value Ref Range Status   10/30/2019 36 (A) >60 mL/min/1.73 m^2 Final     Comment:     Calculation used to obtain the estimated glomerular filtration  rate (eGFR) is the CKD-EPI equation.      10/21/2019 52 (A) >60 mL/min/1.73 m^2 Final     Comment:     Calculation used to  obtain the estimated glomerular filtration  rate (eGFR) is the CKD-EPI equation.      10/12/2019 >60 >60 mL/min/1.73 m^2 Final     Comment:     Calculation used to obtain the estimated glomerular filtration  rate (eGFR) is the CKD-EPI equation.

## 2019-11-01 PROBLEM — R07.9 CHEST PAIN: Status: ACTIVE | Noted: 2019-01-01

## 2019-11-01 PROBLEM — E11.49 TYPE 2 DIABETES MELLITUS WITH NEUROLOGIC COMPLICATION: Status: ACTIVE | Noted: 2019-01-01

## 2019-11-01 PROBLEM — R07.2 PRECORDIAL PAIN: Status: ACTIVE | Noted: 2019-01-01

## 2019-11-01 PROBLEM — I50.32 CHF (CONGESTIVE HEART FAILURE), NYHA CLASS II, CHRONIC, DIASTOLIC: Status: ACTIVE | Noted: 2019-01-01

## 2019-11-02 PROBLEM — E03.8 OTHER SPECIFIED HYPOTHYROIDISM: Status: ACTIVE | Noted: 2019-01-01

## 2019-11-02 PROBLEM — R07.9 CHEST PAIN: Status: RESOLVED | Noted: 2019-01-01 | Resolved: 2019-01-01

## 2019-11-05 PROBLEM — E83.42 HYPOMAGNESEMIA: Status: ACTIVE | Noted: 2019-01-01

## 2019-11-07 PROBLEM — E87.6 ACUTE HYPOKALEMIA: Status: ACTIVE | Noted: 2019-01-01

## 2019-11-07 NOTE — TELEPHONE ENCOUNTER
----- Message from Tang Haro sent at 11/7/2019  9:20 AM CST -----  Contact: [facility], Aline Mireles want to speak with a nurse regarding patient power wheel chair paperwork please call back at [phone]

## 2019-11-13 NOTE — PROGRESS NOTES
Refill Authorization Note     is requesting a refill authorization.    Brief assessment and rationale for refill: DEFER: new start/abnormal labs  Name and strength of medication: FUROSEMIDE 20MG TABLET  Medication-related problems identified: Dose adjustment    Medication Therapy Plan: Refill requested for Furosemide 20mg bid,  dose adjusted to Furosemide 40mg bid([facility]uel-11/19 Hospital Encounter); Na-abnormal;  New start, defer to you    Medication reconciliation completed: No        Comments:   Requested Prescriptions   Pending Prescriptions Disp Refills    furosemide (LASIX) 20 MG tablet [Pharmacy Med Name: FUROSEMIDE 20 MG TABLET] 60 tablet 1     Sig: TAKE 1 TABLET BY MOUTH TWICE A DAY       Cardiovascular:  Diuretics - Loop Failed - 11/13/2019  2:40 PM        Failed - Na in normal range and within 180 days     Sodium   Date Value Ref Range Status   11/07/2019 130 (L) 136 - 145 mmol/L Final   11/07/2019 130 (L) 136 - 145 mmol/L Final   11/07/2019 133 (L) 136 - 145 mmol/L Final              Passed - Patient is at least 18 years old        Passed - Last BP in normal range within 360 days     BP Readings from Last 3 Encounters:   11/11/19 112/80   11/09/19 110/68   11/08/19 126/76              Passed - Office visit in past 12 months or future 90 days     Recent Outpatient Visits            2 weeks ago Acute on chronic right-sided heart failure    [facility] Card[facility] Ghassan Mckinney MD    3 weeks ago Diabetes mellitus type 2 without retinopathy    [facility] Optometry LO Jaramillo, OD    3 weeks ago Essential hypertension    [facility] Medicine MARY JO Malone MD    1 month ago Nausea and vomiting in adult    [facility] Medicine Talita Childs, DNP, APRN    1 month ago Acute renal failure, unspecified acute renal failure type    [facility] Nephrology Tadeo Chino MD          Future Appointments              In 1 week MARY JO Malone MD [facility]  Medicine, [place]    In 4 weeks Jamaal Goodman, ZOE [facility] - [facility]ometry, [place]    In 4 weeks Jamaal Goodman, OD [facility], [place]    In 1 month MD [facility] - Cardiology, [place]    In 2 months MD [facility] - Nephrology, [place]                Passed - K in normal range and within 180 days     Potassium   Date Value Ref Range Status   11/07/2019 4.2 3.5 - 5.1 mmol/L Final   11/07/2019 4.2 3.5 - 5.1 mmol/L Final   11/07/2019 3.8 3.5 - 5.1 mmol/L Final              Passed - Cr is 1.4 or below and within 180 days     Creatinine   Date Value Ref Range Status   11/07/2019 0.89 0.50 - 1.40 mg/dL Final   11/07/2019 0.89 0.50 - 1.40 mg/dL Final   11/07/2019 0.84 0.50 - 1.40 mg/dL Final     POC Creatinine   Date Value Ref Range Status   09/05/2018 1.0 0.5 - 1.4 mg/dL Final              Passed - eGFR in normal range and within 180 days     eGFR if non    Date Value Ref Range Status   11/07/2019 >60 >60 mL/min/1.73 m^2 Final     Comment:     Calculation used to obtain the estimated glomerular filtration  rate (eGFR) is the CKD-EPI equation.      11/07/2019 >60 >60 mL/min/1.73 m^2 Final     Comment:     Calculation used to obtain the estimated glomerular filtration  rate (eGFR) is the CKD-EPI equation.      11/07/2019 >60 >60 mL/min/1.73 m^2 Final     Comment:     Calculation used to obtain the estimated glomerular filtration  rate (eGFR) is the CKD-EPI equation.

## 2019-11-14 PROBLEM — R34 OLIGURIA: Status: ACTIVE | Noted: 2019-01-01

## 2019-11-14 NOTE — TELEPHONE ENCOUNTER
Refill Authorization Note     is requesting a refill authorization.    Brief assessment and rationale for refill: QUICK DC: rts(12/20)  Name and strength of medication: FUROSEMIDE 20MG TABLET  Medication-related problems identified: Dose adjustment    Medication Therapy Plan: Pt prescribed 30-day supply at hospital discharge; has f/u scheduled with PCP 11/25/19; will wait until pt sees PCP for refill     Medication reconciliation completed: No              How patient will take medication: t1 po bid          Comments:   Last Prescribed Info:    Disp Refills Start End    furosemide (LASIX) 40 MG tablet 60 tablet 0 11/8/2019     Sig - Route: Take 1 tablet (40 mg total) by mouth 2 (two) times daily. - Oral    Sent to pharmacy as: furosemide (LASIX) 40 MG tablet    E-Prescribing Status: Receipt confirmed by pharmacy (11/8/2019 10:29 AM CST)

## 2019-11-15 PROBLEM — Z71.89 ADVANCE CARE PLANNING: Status: ACTIVE | Noted: 2019-01-01

## 2019-11-15 PROBLEM — I95.9 HYPOTENSION: Status: ACTIVE | Noted: 2019-01-01

## 2019-11-15 PROBLEM — J96.21 ACUTE ON CHRONIC RESPIRATORY FAILURE WITH HYPOXEMIA: Status: ACTIVE | Noted: 2019-01-01

## 2021-07-22 NOTE — PROGRESS NOTES
Subjective:       Patient ID: Tiana Bosch is a 68 y.o. female.    Chief Complaint: Depression  She was last seen in primary care by me on 09/12/2018. She last saw Faisal on 08/27/2018.   HPI   Following up with depression and anxiety.   However, she was seen in ED on 10/04/2018 and diagnosed with gastritis, Seen by [facility] care on 10/03/2018 and diagnosed with rash to legs.   Vitals:    10/12/18 1103   BP: 138/70   Pulse: 88   Temp: 98 °F (36.7 °C)     Lab Results   Component Value Date    HGBA1C 7.2 (H) 06/10/2018     Review of Systems    She states the Elavil is not working at this time and not giving her any relief from anxiety. Also states Buspar did not help at all.   Objective:      Physical Exam   Constitutional: She is oriented to person, place, and time. Vital signs are normal. She appears well-developed and well-nourished. She is active and cooperative.   HENT:   Head: Normocephalic and atraumatic.   Right Ear: Hearing, tympanic membrane, external ear and ear canal normal.   Left Ear: Hearing, tympanic membrane, external ear and ear canal normal.   Nose: Nose normal.   Mouth/Throat: Uvula is midline, oropharynx is clear and moist and mucous membranes are normal.   Eyes: Lids are normal.   Neck: Trachea normal, normal range of motion, full passive range of motion without pain and phonation normal. Neck supple.   Cardiovascular: Normal rate, regular rhythm and normal heart sounds.   Pulmonary/Chest: Effort normal.   Using oxygen during visit   Abdominal: Soft. Bowel sounds are normal.   Musculoskeletal: Normal range of motion.   Lymphadenopathy:        Head (right side): No submental, no submandibular, no tonsillar, no preauricular, no posterior auricular and no occipital adenopathy present.        Head (left side): No submental, no submandibular, no tonsillar, no preauricular, no posterior auricular and no occipital adenopathy present.     She has no cervical adenopathy.   Neurological: She is alert  and oriented to person, place, and time.   Skin: Skin is warm, dry and intact.   Psychiatric: She has a normal mood and affect. Her speech is normal and behavior is normal. Judgment and thought content normal. Cognition and memory are normal.   Nursing note and vitals reviewed.      Assessment & Plan:       Essential hypertension-Stable Prinvil    Anxiety- Not adequately controlled    Other depression    On home oxygen therapy- Stable, wearing oxygen during visit    Type 2 diabetes mellitus with diabetic polyneuropathy, without long-term current use of insulin-Stable glucophage, glucotrol  -     Ambulatory referral to Podiatry    Women's annual routine gynecological examination  -     Ambulatory referral to Gynecology         Medication List           Accurate as of 10/12/18  5:22 PM. If you have any questions, ask your nurse or doctor.               CHANGE how you take these medications    fluticasone 50 mcg/actuation nasal spray  Commonly known as:  FLONASE  1 spray (50 mcg total) by Each Nare route once daily.  What changed:    · when to take this  · reasons to take this        CONTINUE taking these medications    ALBUTEROL INHL     ASMANEX TWISTHALER 220 mcg (60 doses) Aepb  Generic drug:  mometasone     aspirin 81 MG EC tablet  Commonly known as:  ECOTRIN  Take 1 tablet (81 mg total) by mouth once daily.     BEVESPI AEROSPHERE 9-4.8 mcg Hfaa  Generic drug:  glycopyrrolate-formoterol     blood sugar diagnostic Strp  Commonly known as:  ONETOUCH ULTRA TEST  TEST ONE TIME AMAYA.     blood-glucose meter kit  Use as instructed     calcium carbonate 500 mg calcium (1,250 mg) tablet  Commonly known as:  OS-RENALDO     CHILDREN'S ALLEGRA ALLERGY ORAL     cyclobenzaprine 10 MG tablet  Commonly known as:  FLEXERIL  TAKE 1 TABLET BY MOUTH 3 TIMES DAILY     fluocinolone acetonide oil 0.01 % Drop  INSTILL THREE DROPS INTO AFFECTED EAR TWICE DAILY     gabapentin 400 MG capsule  Commonly known as:  NEURONTIN  Take two capsules in  the morning and afternoon and three capsules at night.     glipiZIDE 5 MG tablet  Commonly known as:  GLUCOTROL  TAKE 1 TABLET (5 MG TOTAL) BY MOUTH 2 (TWO) TIMES DAILY BEFORE MEALS.     ipratropium 42 mcg (0.06 %) nasal spray  Commonly known as:  ATROVENT  2 sprays by Nasal route 4 (four) times daily. As needed for rhinitis     lancets 33 gauge Misc  Commonly known as:  ONETOUCH DELICA LANCETS  1 lancet by Misc.(Non-Drug; Combo Route) route Daily. Use to     lisinopril 5 MG tablet  Commonly known as:  PRINIVIL,ZESTRIL  Take 1 tablet (5 mg total) by mouth once daily.     metFORMIN 500 MG 24 hr tablet  Commonly known as:  GLUCOPHAGE-XR  TAKE 1 TABLET BY MOUTH TWICE A DAY WITH FOOD     metoprolol tartrate 25 MG tablet  Commonly known as:  LOPRESSOR  Take 1 tablet (25 mg total) by mouth 2 (two) times daily.     nitroGLYCERIN 0.4 MG SL tablet  Commonly known as:  NITROSTAT  Place 1 tablet (0.4 mg total) under the tongue every 5 (five) minutes as needed for Chest pain.     OPTICHAMBER MOOSE [facility]  Generic drug:  inhalation spacing device  USE AS DIRECTED FOR INHALATION. PLEASE RUN FOR INSURANCE PREFERRED SPACER AND PT PREFERRED SIZE.     pantoprazole 40 MG tablet  Commonly known as:  PROTONIX  Take 1 tablet (40 mg total) by mouth once daily.     traMADol 50 mg tablet  Commonly known as:  ULTRAM  Take 2 tablets (100 mg total) by mouth every 8 (eight) hours as needed.        STOP taking these medications    amitriptyline 25 MG tablet  Commonly known as:  ELAVIL  Stopped by:  Talita Childs DNP, APRN     azithromycin 250 MG tablet  Commonly known as:  Z-AMNA  Stopped by:  Talita Childs DNP, BARB              Follow-up if symptoms worsen or fail to improve.   hip OA

## 2022-01-04 NOTE — TELEPHONE ENCOUNTER
Is she scheduling with a pain management doc? i'm not going to continue just refilling; may do #90 as last script   Outpatient Medications Marked as Taking for the 1/4/22 encounter (Refill) with Onel Granados MD   Medication Sig Dispense Refill   • Insulin Syringe-Needle U-100 (B-D INSULIN SYRINGE ULTRAFINE) 31G X 5/16\" 1 ML Misc use twice daily with lantus. 100 each 2        Ok to leave detailed Message: Yes  Informed caller of refill policy- 24-48 hours: Yes  No call back needed unless nurse has questions.     Pharmacy: [facility]